# Patient Record
Sex: FEMALE | Race: BLACK OR AFRICAN AMERICAN | NOT HISPANIC OR LATINO | Employment: OTHER | URBAN - METROPOLITAN AREA
[De-identification: names, ages, dates, MRNs, and addresses within clinical notes are randomized per-mention and may not be internally consistent; named-entity substitution may affect disease eponyms.]

---

## 2023-01-01 ENCOUNTER — APPOINTMENT (OUTPATIENT)
Dept: DIALYSIS | Facility: HOSPITAL | Age: 53
DRG: 252 | End: 2023-01-01
Payer: MEDICARE

## 2023-01-01 ENCOUNTER — HOSPITAL ENCOUNTER (INPATIENT)
Dept: DATA CONVERSION | Facility: HOSPITAL | Age: 53
LOS: 16 days | Discharge: HOSPICE | DRG: 252 | End: 2023-10-08
Attending: EMERGENCY MEDICINE | Admitting: SURGERY
Payer: MEDICARE

## 2023-01-01 ENCOUNTER — HOSPITAL ENCOUNTER (OUTPATIENT)
Dept: DATA CONVERSION | Facility: HOSPITAL | Age: 53
End: 2023-08-24
Attending: INTERNAL MEDICINE
Payer: MEDICARE

## 2023-01-01 ENCOUNTER — APPOINTMENT (OUTPATIENT)
Dept: RADIOLOGY | Facility: HOSPITAL | Age: 53
DRG: 252 | End: 2023-01-01
Payer: MEDICARE

## 2023-01-01 VITALS
HEIGHT: 60 IN | OXYGEN SATURATION: 98 % | WEIGHT: 97 LBS | TEMPERATURE: 98.6 F | BODY MASS INDEX: 19.04 KG/M2 | SYSTOLIC BLOOD PRESSURE: 173 MMHG | HEART RATE: 98 BPM | RESPIRATION RATE: 17 BRPM | DIASTOLIC BLOOD PRESSURE: 70 MMHG

## 2023-01-01 DIAGNOSIS — N18.6 ESRD (END STAGE RENAL DISEASE) ON DIALYSIS (MULTI): Primary | ICD-10-CM

## 2023-01-01 DIAGNOSIS — T82.838A: ICD-10-CM

## 2023-01-01 DIAGNOSIS — Z99.2 ESRD (END STAGE RENAL DISEASE) ON DIALYSIS (MULTI): Primary | ICD-10-CM

## 2023-01-01 LAB
ABO GROUP (TYPE) IN BLOOD: NORMAL
ACANTHOCYTES BLD QL SMEAR: ABNORMAL
ACTIVATED PARTIAL THROMBOPLASTIN TIME IN PPP BY COAGULATION ASSAY: 33 SEC (ref 27–38)
ACTIVATED PARTIAL THROMBOPLASTIN TIME IN PPP BY COAGULATION ASSAY: 41 SEC (ref 27–38)
ACTIVATED PARTIAL THROMBOPLASTIN TIME IN PPP BY COAGULATION ASSAY: 55 SEC (ref 27–38)
AFB CULTURE: NORMAL
AFB STAIN: NORMAL
ALANINE AMINOTRANSFERASE (SGPT) (U/L) IN SER/PLAS: 10 U/L (ref 7–45)
ALANINE AMINOTRANSFERASE (SGPT) (U/L) IN SER/PLAS: 11 U/L (ref 7–45)
ALANINE AMINOTRANSFERASE (SGPT) (U/L) IN SER/PLAS: 4 U/L (ref 7–45)
ALANINE AMINOTRANSFERASE (SGPT) (U/L) IN SER/PLAS: 5 U/L (ref 7–45)
ALANINE AMINOTRANSFERASE (SGPT) (U/L) IN SER/PLAS: 6 U/L (ref 7–45)
ALANINE AMINOTRANSFERASE (SGPT) (U/L) IN SER/PLAS: 6 U/L (ref 7–45)
ALANINE AMINOTRANSFERASE (SGPT) (U/L) IN SER/PLAS: 7 U/L (ref 7–45)
ALANINE AMINOTRANSFERASE (SGPT) (U/L) IN SER/PLAS: 7 U/L (ref 7–45)
ALANINE AMINOTRANSFERASE (SGPT) (U/L) IN SER/PLAS: 8 U/L (ref 7–45)
ALANINE AMINOTRANSFERASE (SGPT) (U/L) IN SER/PLAS: NORMAL
ALANINE AMINOTRANSFERASE (SGPT) (U/L) IN SER/PLAS: NORMAL
ALBUMIN (G/DL) IN SER/PLAS: 2.5 G/DL (ref 3.4–5)
ALBUMIN (G/DL) IN SER/PLAS: 2.6 G/DL (ref 3.4–5)
ALBUMIN (G/DL) IN SER/PLAS: 2.7 G/DL (ref 3.4–5)
ALBUMIN (G/DL) IN SER/PLAS: 2.7 G/DL (ref 3.4–5)
ALBUMIN (G/DL) IN SER/PLAS: 2.8 G/DL (ref 3.4–5)
ALBUMIN (G/DL) IN SER/PLAS: 2.9 G/DL (ref 3.4–5)
ALBUMIN (G/DL) IN SER/PLAS: 3 G/DL (ref 3.4–5)
ALBUMIN (G/DL) IN SER/PLAS: 3.1 G/DL (ref 3.4–5)
ALBUMIN (G/DL) IN SER/PLAS: 3.1 G/DL (ref 3.4–5)
ALBUMIN (G/DL) IN SER/PLAS: 3.2 G/DL (ref 3.4–5)
ALBUMIN (G/DL) IN SER/PLAS: 4.2 G/DL (ref 3.4–5)
ALBUMIN (G/DL) IN SER/PLAS: 4.6 G/DL (ref 3.4–5)
ALBUMIN (G/DL) IN SER/PLAS: 4.7 G/DL (ref 3.4–5)
ALBUMIN (G/DL) IN SER/PLAS: 4.7 G/DL (ref 3.4–5)
ALBUMIN (G/DL) IN SER/PLAS: 4.8 G/DL (ref 3.4–5)
ALBUMIN (G/DL) IN SER/PLAS: 5.1 G/DL (ref 3.4–5)
ALBUMIN (G/DL) IN SER/PLAS: 5.1 G/DL (ref 3.4–5)
ALBUMIN (G/DL) IN SER/PLAS: 5.5 G/DL (ref 3.4–5)
ALBUMIN (G/DL) IN SER/PLAS: NORMAL
ALBUMIN (G/DL) IN SER/PLAS: NORMAL
ALBUMIN ELP: 4.9 G/DL (ref 3.4–5)
ALBUMIN SERPL BCP-MCNC: 2.7 G/DL (ref 3.4–5)
ALBUMIN SERPL BCP-MCNC: 2.8 G/DL (ref 3.4–5)
ALBUMIN SERPL BCP-MCNC: 2.9 G/DL (ref 3.4–5)
ALKALINE PHOSPHATASE (U/L) IN SER/PLAS: 101 U/L (ref 33–110)
ALKALINE PHOSPHATASE (U/L) IN SER/PLAS: 119 U/L (ref 33–110)
ALKALINE PHOSPHATASE (U/L) IN SER/PLAS: 123 U/L (ref 33–110)
ALKALINE PHOSPHATASE (U/L) IN SER/PLAS: 131 U/L (ref 33–110)
ALKALINE PHOSPHATASE (U/L) IN SER/PLAS: 137 U/L (ref 33–110)
ALKALINE PHOSPHATASE (U/L) IN SER/PLAS: 146 U/L (ref 33–110)
ALKALINE PHOSPHATASE (U/L) IN SER/PLAS: 150 U/L (ref 33–110)
ALKALINE PHOSPHATASE (U/L) IN SER/PLAS: 152 U/L (ref 33–110)
ALKALINE PHOSPHATASE (U/L) IN SER/PLAS: 157 U/L (ref 33–110)
ALKALINE PHOSPHATASE (U/L) IN SER/PLAS: NORMAL
ALKALINE PHOSPHATASE (U/L) IN SER/PLAS: NORMAL
ALP SERPL-CCNC: 105 U/L (ref 33–110)
ALPHA 1: 0.5 G/DL (ref 0.2–0.6)
ALPHA 2: 0.8 G/DL (ref 0.4–1.1)
ALT SERPL W P-5'-P-CCNC: 3 U/L (ref 7–45)
AMMONIA (UMOL/L) IN PLASMA: NORMAL
ANION GAP BLDV CALCULATED.4IONS-SCNC: 5 MMOL/L (ref 10–25)
ANION GAP IN SER/PLAS: 11 MMOL/L (ref 10–20)
ANION GAP IN SER/PLAS: 12 MMOL/L (ref 10–20)
ANION GAP IN SER/PLAS: 13 MMOL/L (ref 10–20)
ANION GAP IN SER/PLAS: 14 MMOL/L (ref 10–20)
ANION GAP IN SER/PLAS: 14 MMOL/L (ref 10–20)
ANION GAP IN SER/PLAS: 15 MMOL/L (ref 10–20)
ANION GAP IN SER/PLAS: 16 MMOL/L (ref 10–20)
ANION GAP IN SER/PLAS: 16 MMOL/L (ref 10–20)
ANION GAP IN SER/PLAS: 18 MMOL/L (ref 10–20)
ANION GAP IN SER/PLAS: 20 MMOL/L (ref 10–20)
ANION GAP IN SER/PLAS: 20 MMOL/L (ref 10–20)
ANION GAP IN SER/PLAS: 21 MMOL/L (ref 10–20)
ANION GAP IN SER/PLAS: 21 MMOL/L (ref 10–20)
ANION GAP IN SER/PLAS: 25 MMOL/L (ref 10–20)
ANION GAP IN SER/PLAS: 25 MMOL/L (ref 10–20)
ANION GAP IN SER/PLAS: 26 MMOL/L (ref 10–20)
ANION GAP IN SER/PLAS: 26 MMOL/L (ref 10–20)
ANION GAP IN SER/PLAS: 30 MMOL/L (ref 10–20)
ANION GAP IN SER/PLAS: NORMAL
ANION GAP IN SER/PLAS: NORMAL
ANION GAP SERPL CALC-SCNC: 13 MMOL/L (ref 10–20)
ANION GAP SERPL CALC-SCNC: 16 MMOL/L (ref 10–20)
ANION GAP SERPL CALC-SCNC: 18 MMOL/L (ref 10–20)
ANION GAP SERPL CALC-SCNC: 18 MMOL/L (ref 10–20)
ANION GAP SERPL CALC-SCNC: 26 MMOL/L (ref 10–20)
ANION GAP SERPL CALC-SCNC: 27 MMOL/L (ref 10–20)
ANTIBODY ELUTION: NEGATIVE
ANTIBODY SCREEN: NORMAL
APTT PPP: 30 SECONDS (ref 27–38)
APTT PPP: 30 SECONDS (ref 27–38)
APTT PPP: 32 SECONDS (ref 27–38)
ASPARTATE AMINOTRANSFERASE (SGOT) (U/L) IN SER/PLAS: 16 U/L (ref 9–39)
ASPARTATE AMINOTRANSFERASE (SGOT) (U/L) IN SER/PLAS: 16 U/L (ref 9–39)
ASPARTATE AMINOTRANSFERASE (SGOT) (U/L) IN SER/PLAS: 18 U/L (ref 9–39)
ASPARTATE AMINOTRANSFERASE (SGOT) (U/L) IN SER/PLAS: 22 U/L (ref 9–39)
ASPARTATE AMINOTRANSFERASE (SGOT) (U/L) IN SER/PLAS: 23 U/L (ref 9–39)
ASPARTATE AMINOTRANSFERASE (SGOT) (U/L) IN SER/PLAS: 32 U/L (ref 9–39)
ASPARTATE AMINOTRANSFERASE (SGOT) (U/L) IN SER/PLAS: 5 U/L (ref 9–39)
ASPARTATE AMINOTRANSFERASE (SGOT) (U/L) IN SER/PLAS: 6 U/L (ref 9–39)
ASPARTATE AMINOTRANSFERASE (SGOT) (U/L) IN SER/PLAS: 7 U/L (ref 9–39)
ASPARTATE AMINOTRANSFERASE (SGOT) (U/L) IN SER/PLAS: NORMAL
ASPARTATE AMINOTRANSFERASE (SGOT) (U/L) IN SER/PLAS: NORMAL
AST SERPL W P-5'-P-CCNC: 15 U/L (ref 9–39)
BACTERIA BLD CULT: NORMAL
BASE EXCESS BLDV CALC-SCNC: 6.5 MMOL/L (ref -2–3)
BASOPHILS # BLD AUTO: 0.13 X10*3/UL (ref 0–0.1)
BASOPHILS # BLD MANUAL: 0.4 X10*3/UL (ref 0–0.1)
BASOPHILS # BLD MANUAL: 0.71 X10*3/UL (ref 0–0.1)
BASOPHILS # BLD MANUAL: 1.34 X10*3/UL (ref 0–0.1)
BASOPHILS (10*3/UL) IN BLOOD BY AUTOMATED COUNT: 0.05 X10E9/L (ref 0–0.1)
BASOPHILS NFR BLD AUTO: 1 %
BASOPHILS NFR BLD MANUAL: 2.7 %
BASOPHILS NFR BLD MANUAL: 4.3 %
BASOPHILS NFR BLD MANUAL: 4.9 %
BASOPHILS/100 LEUKOCYTES IN BLOOD BY AUTOMATED COUNT: 0.3 % (ref 0–2)
BB ANTIBODY IDENTIFICATION: NORMAL
BETA: 0.8 G/DL (ref 0.5–1.2)
BILIRUB SERPL-MCNC: 1.1 MG/DL (ref 0–1.2)
BILIRUBIN TOTAL (MG/DL) IN SER/PLAS: 0.7 MG/DL (ref 0–1.2)
BILIRUBIN TOTAL (MG/DL) IN SER/PLAS: 0.7 MG/DL (ref 0–1.2)
BILIRUBIN TOTAL (MG/DL) IN SER/PLAS: 0.8 MG/DL (ref 0–1.2)
BILIRUBIN TOTAL (MG/DL) IN SER/PLAS: 1.4 MG/DL (ref 0–1.2)
BILIRUBIN TOTAL (MG/DL) IN SER/PLAS: 1.5 MG/DL (ref 0–1.2)
BILIRUBIN TOTAL (MG/DL) IN SER/PLAS: 2.1 MG/DL (ref 0–1.2)
BILIRUBIN TOTAL (MG/DL) IN SER/PLAS: NORMAL
BILIRUBIN TOTAL (MG/DL) IN SER/PLAS: NORMAL
BLOOD CULTURE: NORMAL
BLOOD EXPIRATION DATE: NORMAL
BLOOD EXPIRATION DATE: NORMAL
BODY TEMPERATURE: ABNORMAL
BUN SERPL-MCNC: 13 MG/DL (ref 6–23)
BUN SERPL-MCNC: 14 MG/DL (ref 6–23)
BUN SERPL-MCNC: 22 MG/DL (ref 6–23)
BUN SERPL-MCNC: 35 MG/DL (ref 6–23)
BUN SERPL-MCNC: 36 MG/DL (ref 6–23)
BUN SERPL-MCNC: 71 MG/DL (ref 6–23)
C REACTIVE PROTEIN (MG/L) IN SER/PLAS: 9.84 MG/DL
C. DIFFICILE TOXIN, PCR: NOT DETECTED
CA-I BLDV-SCNC: 1.17 MMOL/L (ref 1.1–1.33)
CALCIDIOL (25 OH VITAMIN D3) (NG/ML) IN SER/PLAS: 15 NG/ML
CALCIUM (MG/DL) IN SER/PLAS: 10.7 MG/DL (ref 8.6–10.3)
CALCIUM (MG/DL) IN SER/PLAS: 11 MG/DL (ref 8.6–10.3)
CALCIUM (MG/DL) IN SER/PLAS: 11.1 MG/DL (ref 8.6–10.3)
CALCIUM (MG/DL) IN SER/PLAS: 11.2 MG/DL (ref 8.6–10.3)
CALCIUM (MG/DL) IN SER/PLAS: 11.2 MG/DL (ref 8.6–10.3)
CALCIUM (MG/DL) IN SER/PLAS: 11.5 MG/DL (ref 8.6–10.3)
CALCIUM (MG/DL) IN SER/PLAS: 11.6 MG/DL (ref 8.6–10.3)
CALCIUM (MG/DL) IN SER/PLAS: 11.8 MG/DL (ref 8.6–10.3)
CALCIUM (MG/DL) IN SER/PLAS: 12.2 MG/DL (ref 8.6–10.3)
CALCIUM (MG/DL) IN SER/PLAS: 12.4 MG/DL (ref 8.6–10.3)
CALCIUM (MG/DL) IN SER/PLAS: 13.5 MG/DL (ref 8.6–10.3)
CALCIUM (MG/DL) IN SER/PLAS: 7.9 MG/DL (ref 8.6–10.6)
CALCIUM (MG/DL) IN SER/PLAS: 8.5 MG/DL (ref 8.6–10.6)
CALCIUM (MG/DL) IN SER/PLAS: 8.6 MG/DL (ref 8.6–10.6)
CALCIUM (MG/DL) IN SER/PLAS: 8.8 MG/DL (ref 8.6–10.6)
CALCIUM (MG/DL) IN SER/PLAS: 8.8 MG/DL (ref 8.6–10.6)
CALCIUM (MG/DL) IN SER/PLAS: 8.9 MG/DL (ref 8.6–10.6)
CALCIUM (MG/DL) IN SER/PLAS: 8.9 MG/DL (ref 8.6–10.6)
CALCIUM (MG/DL) IN SER/PLAS: 9 MG/DL (ref 8.6–10.6)
CALCIUM (MG/DL) IN SER/PLAS: 9.1 MG/DL (ref 8.6–10.3)
CALCIUM (MG/DL) IN SER/PLAS: 9.5 MG/DL (ref 8.6–10.6)
CALCIUM (MG/DL) IN SER/PLAS: 9.5 MG/DL (ref 8.6–10.6)
CALCIUM (MG/DL) IN SER/PLAS: 9.9 MG/DL (ref 8.6–10.3)
CALCIUM (MG/DL) IN SER/PLAS: NORMAL
CALCIUM (MG/DL) IN SER/PLAS: NORMAL
CALCIUM SERPL-MCNC: 10.2 MG/DL (ref 8.6–10.6)
CALCIUM SERPL-MCNC: 8.7 MG/DL (ref 8.6–10.6)
CALCIUM SERPL-MCNC: 8.9 MG/DL (ref 8.6–10.6)
CALCIUM SERPL-MCNC: 9.3 MG/DL (ref 8.6–10.6)
CALCIUM SERPL-MCNC: 9.6 MG/DL (ref 8.6–10.6)
CALCIUM SERPL-MCNC: 9.9 MG/DL (ref 8.6–10.6)
CARBON DIOXIDE, TOTAL (MMOL/L) IN SER/PLAS: 22 MMOL/L (ref 21–32)
CARBON DIOXIDE, TOTAL (MMOL/L) IN SER/PLAS: 23 MMOL/L (ref 21–32)
CARBON DIOXIDE, TOTAL (MMOL/L) IN SER/PLAS: 25 MMOL/L (ref 21–32)
CARBON DIOXIDE, TOTAL (MMOL/L) IN SER/PLAS: 26 MMOL/L (ref 21–32)
CARBON DIOXIDE, TOTAL (MMOL/L) IN SER/PLAS: 26 MMOL/L (ref 21–32)
CARBON DIOXIDE, TOTAL (MMOL/L) IN SER/PLAS: 27 MMOL/L (ref 21–32)
CARBON DIOXIDE, TOTAL (MMOL/L) IN SER/PLAS: 28 MMOL/L (ref 21–32)
CARBON DIOXIDE, TOTAL (MMOL/L) IN SER/PLAS: 29 MMOL/L (ref 21–32)
CARBON DIOXIDE, TOTAL (MMOL/L) IN SER/PLAS: 30 MMOL/L (ref 21–32)
CARBON DIOXIDE, TOTAL (MMOL/L) IN SER/PLAS: 31 MMOL/L (ref 21–32)
CARBON DIOXIDE, TOTAL (MMOL/L) IN SER/PLAS: 31 MMOL/L (ref 21–32)
CARBON DIOXIDE, TOTAL (MMOL/L) IN SER/PLAS: 32 MMOL/L (ref 21–32)
CARBON DIOXIDE, TOTAL (MMOL/L) IN SER/PLAS: 32 MMOL/L (ref 21–32)
CARBON DIOXIDE, TOTAL (MMOL/L) IN SER/PLAS: 34 MMOL/L (ref 21–32)
CARBON DIOXIDE, TOTAL (MMOL/L) IN SER/PLAS: 36 MMOL/L (ref 21–32)
CARBON DIOXIDE, TOTAL (MMOL/L) IN SER/PLAS: 37 MMOL/L (ref 21–32)
CARBON DIOXIDE, TOTAL (MMOL/L) IN SER/PLAS: NORMAL
CARBON DIOXIDE, TOTAL (MMOL/L) IN SER/PLAS: NORMAL
CASE #: NORMAL
CASE #: NORMAL
CHLORIDE (MMOL/L) IN SER/PLAS: 100 MMOL/L (ref 98–107)
CHLORIDE (MMOL/L) IN SER/PLAS: 100 MMOL/L (ref 98–107)
CHLORIDE (MMOL/L) IN SER/PLAS: 102 MMOL/L (ref 98–107)
CHLORIDE (MMOL/L) IN SER/PLAS: 103 MMOL/L (ref 98–107)
CHLORIDE (MMOL/L) IN SER/PLAS: 82 MMOL/L (ref 98–107)
CHLORIDE (MMOL/L) IN SER/PLAS: 84 MMOL/L (ref 98–107)
CHLORIDE (MMOL/L) IN SER/PLAS: 85 MMOL/L (ref 98–107)
CHLORIDE (MMOL/L) IN SER/PLAS: 88 MMOL/L (ref 98–107)
CHLORIDE (MMOL/L) IN SER/PLAS: 88 MMOL/L (ref 98–107)
CHLORIDE (MMOL/L) IN SER/PLAS: 89 MMOL/L (ref 98–107)
CHLORIDE (MMOL/L) IN SER/PLAS: 89 MMOL/L (ref 98–107)
CHLORIDE (MMOL/L) IN SER/PLAS: 90 MMOL/L (ref 98–107)
CHLORIDE (MMOL/L) IN SER/PLAS: 91 MMOL/L (ref 98–107)
CHLORIDE (MMOL/L) IN SER/PLAS: 91 MMOL/L (ref 98–107)
CHLORIDE (MMOL/L) IN SER/PLAS: 92 MMOL/L (ref 98–107)
CHLORIDE (MMOL/L) IN SER/PLAS: 92 MMOL/L (ref 98–107)
CHLORIDE (MMOL/L) IN SER/PLAS: 93 MMOL/L (ref 98–107)
CHLORIDE (MMOL/L) IN SER/PLAS: 94 MMOL/L (ref 98–107)
CHLORIDE (MMOL/L) IN SER/PLAS: 95 MMOL/L (ref 98–107)
CHLORIDE (MMOL/L) IN SER/PLAS: 97 MMOL/L (ref 98–107)
CHLORIDE (MMOL/L) IN SER/PLAS: 97 MMOL/L (ref 98–107)
CHLORIDE (MMOL/L) IN SER/PLAS: 98 MMOL/L (ref 98–107)
CHLORIDE (MMOL/L) IN SER/PLAS: NORMAL
CHLORIDE (MMOL/L) IN SER/PLAS: NORMAL
CHLORIDE BLDV-SCNC: 104 MMOL/L (ref 98–107)
CHLORIDE SERPL-SCNC: 100 MMOL/L (ref 98–107)
CHLORIDE SERPL-SCNC: 101 MMOL/L (ref 98–107)
CHLORIDE SERPL-SCNC: 102 MMOL/L (ref 98–107)
CHLORIDE SERPL-SCNC: 103 MMOL/L (ref 98–107)
CHLORIDE SERPL-SCNC: 104 MMOL/L (ref 98–107)
CHLORIDE SERPL-SCNC: 105 MMOL/L (ref 98–107)
CO2 SERPL-SCNC: 21 MMOL/L (ref 21–32)
CO2 SERPL-SCNC: 22 MMOL/L (ref 21–32)
CO2 SERPL-SCNC: 26 MMOL/L (ref 21–32)
CO2 SERPL-SCNC: 27 MMOL/L (ref 21–32)
CO2 SERPL-SCNC: 28 MMOL/L (ref 21–32)
CO2 SERPL-SCNC: 32 MMOL/L (ref 21–32)
COBALAMIN (VITAMIN B12) (PG/ML) IN SER/PLAS: 1631 PG/ML (ref 211–911)
CORTISOL (UG/DL) IN SERUM - AM: 15.1 UG/DL (ref 5–20)
CREAT SERPL-MCNC: 1.48 MG/DL (ref 0.5–1.05)
CREAT SERPL-MCNC: 1.48 MG/DL (ref 0.5–1.05)
CREAT SERPL-MCNC: 1.85 MG/DL (ref 0.5–1.05)
CREAT SERPL-MCNC: 2.74 MG/DL (ref 0.5–1.05)
CREAT SERPL-MCNC: 2.78 MG/DL (ref 0.5–1.05)
CREAT SERPL-MCNC: 3.81 MG/DL (ref 0.5–1.05)
CREATININE (MG/DL) IN SER/PLAS: 1.27 MG/DL (ref 0.5–1.05)
CREATININE (MG/DL) IN SER/PLAS: 1.47 MG/DL (ref 0.5–1.05)
CREATININE (MG/DL) IN SER/PLAS: 1.73 MG/DL (ref 0.5–1.05)
CREATININE (MG/DL) IN SER/PLAS: 1.94 MG/DL (ref 0.5–1.05)
CREATININE (MG/DL) IN SER/PLAS: 1.99 MG/DL (ref 0.5–1.05)
CREATININE (MG/DL) IN SER/PLAS: 2.51 MG/DL (ref 0.5–1.05)
CREATININE (MG/DL) IN SER/PLAS: 2.67 MG/DL (ref 0.5–1.05)
CREATININE (MG/DL) IN SER/PLAS: 3.57 MG/DL (ref 0.5–1.05)
CREATININE (MG/DL) IN SER/PLAS: 3.57 MG/DL (ref 0.5–1.05)
CREATININE (MG/DL) IN SER/PLAS: 3.63 MG/DL (ref 0.5–1.05)
CREATININE (MG/DL) IN SER/PLAS: 3.65 MG/DL (ref 0.5–1.05)
CREATININE (MG/DL) IN SER/PLAS: 3.72 MG/DL (ref 0.5–1.05)
CREATININE (MG/DL) IN SER/PLAS: 4.57 MG/DL (ref 0.5–1.05)
CREATININE (MG/DL) IN SER/PLAS: 4.95 MG/DL (ref 0.5–1.05)
CREATININE (MG/DL) IN SER/PLAS: 4.97 MG/DL (ref 0.5–1.05)
CREATININE (MG/DL) IN SER/PLAS: 5.01 MG/DL (ref 0.5–1.05)
CREATININE (MG/DL) IN SER/PLAS: 5.22 MG/DL (ref 0.5–1.05)
CREATININE (MG/DL) IN SER/PLAS: 5.9 MG/DL (ref 0.5–1.05)
CREATININE (MG/DL) IN SER/PLAS: 6.44 MG/DL (ref 0.5–1.05)
CREATININE (MG/DL) IN SER/PLAS: 6.9 MG/DL (ref 0.5–1.05)
CREATININE (MG/DL) IN SER/PLAS: 7 MG/DL (ref 0.5–1.05)
CREATININE (MG/DL) IN SER/PLAS: 7.6 MG/DL (ref 0.5–1.05)
CREATININE (MG/DL) IN SER/PLAS: 8.23 MG/DL (ref 0.5–1.05)
CREATININE (MG/DL) IN SER/PLAS: 8.36 MG/DL (ref 0.5–1.05)
CREATININE (MG/DL) IN SER/PLAS: 8.53 MG/DL (ref 0.5–1.05)
CREATININE (MG/DL) IN SER/PLAS: NORMAL
CREATININE (MG/DL) IN SER/PLAS: NORMAL
D DIMER PPP FEU-MCNC: 4882 NG/ML FEU
DAT-COMPLEMENT: NORMAL
DAT-COMPLEMENT: NORMAL
DAT-IGG: NORMAL
DAT-IGG: NORMAL
DAT-POLYSPECIFIC: NORMAL
DAT-POLYSPECIFIC: NORMAL
DISPENSE STATUS: NORMAL
DISPENSE STATUS: NORMAL
EOSINOPHIL # BLD AUTO: 1.08 X10*3/UL (ref 0–0.7)
EOSINOPHIL # BLD MANUAL: 1.31 X10*3/UL (ref 0–0.7)
EOSINOPHIL # BLD MANUAL: 2.32 X10*3/UL (ref 0–0.7)
EOSINOPHIL # BLD MANUAL: 2.67 X10*3/UL (ref 0–0.7)
EOSINOPHIL NFR BLD AUTO: 8.6 %
EOSINOPHIL NFR BLD MANUAL: 16.1 %
EOSINOPHIL NFR BLD MANUAL: 8.6 %
EOSINOPHIL NFR BLD MANUAL: 8.9 %
EOSINOPHILS (10*3/UL) IN BLOOD BY AUTOMATED COUNT: 0.81 X10E9/L (ref 0–0.7)
EOSINOPHILS/100 LEUKOCYTES IN BLOOD BY AUTOMATED COUNT: 5.3 % (ref 0–6)
ERYTHROCYTE DISTRIBUTION WIDTH (RATIO) BY AUTOMATED COUNT: 15.4 % (ref 11.5–14.5)
ERYTHROCYTE DISTRIBUTION WIDTH (RATIO) BY AUTOMATED COUNT: 15.9 % (ref 11.5–14.5)
ERYTHROCYTE DISTRIBUTION WIDTH (RATIO) BY AUTOMATED COUNT: 16.2 % (ref 11.5–14.5)
ERYTHROCYTE DISTRIBUTION WIDTH (RATIO) BY AUTOMATED COUNT: 16.3 % (ref 11.5–14.5)
ERYTHROCYTE DISTRIBUTION WIDTH (RATIO) BY AUTOMATED COUNT: 16.3 % (ref 11.5–14.5)
ERYTHROCYTE DISTRIBUTION WIDTH (RATIO) BY AUTOMATED COUNT: 16.4 % (ref 11.5–14.5)
ERYTHROCYTE DISTRIBUTION WIDTH (RATIO) BY AUTOMATED COUNT: 16.9 % (ref 11.5–14.5)
ERYTHROCYTE DISTRIBUTION WIDTH (RATIO) BY AUTOMATED COUNT: 16.9 % (ref 11.5–14.5)
ERYTHROCYTE DISTRIBUTION WIDTH (RATIO) BY AUTOMATED COUNT: 17.2 % (ref 11.5–14.5)
ERYTHROCYTE DISTRIBUTION WIDTH (RATIO) BY AUTOMATED COUNT: 17.2 % (ref 11.5–14.5)
ERYTHROCYTE DISTRIBUTION WIDTH (RATIO) BY AUTOMATED COUNT: 17.6 % (ref 11.5–14.5)
ERYTHROCYTE DISTRIBUTION WIDTH (RATIO) BY AUTOMATED COUNT: 17.8 % (ref 11.5–14.5)
ERYTHROCYTE DISTRIBUTION WIDTH (RATIO) BY AUTOMATED COUNT: 18 % (ref 11.5–14.5)
ERYTHROCYTE DISTRIBUTION WIDTH (RATIO) BY AUTOMATED COUNT: 18.1 % (ref 11.5–14.5)
ERYTHROCYTE DISTRIBUTION WIDTH (RATIO) BY AUTOMATED COUNT: 18.1 % (ref 11.5–14.5)
ERYTHROCYTE DISTRIBUTION WIDTH (RATIO) BY AUTOMATED COUNT: 18.2 % (ref 11.5–14.5)
ERYTHROCYTE DISTRIBUTION WIDTH (RATIO) BY AUTOMATED COUNT: 18.4 % (ref 11.5–14.5)
ERYTHROCYTE DISTRIBUTION WIDTH (RATIO) BY AUTOMATED COUNT: 18.6 % (ref 11.5–14.5)
ERYTHROCYTE DISTRIBUTION WIDTH (RATIO) BY AUTOMATED COUNT: 18.7 % (ref 11.5–14.5)
ERYTHROCYTE DISTRIBUTION WIDTH (RATIO) BY AUTOMATED COUNT: 19.9 % (ref 11.5–14.5)
ERYTHROCYTE DISTRIBUTION WIDTH (RATIO) BY AUTOMATED COUNT: 19.9 % (ref 11.5–14.5)
ERYTHROCYTE DISTRIBUTION WIDTH (RATIO) BY AUTOMATED COUNT: NORMAL
ERYTHROCYTE DISTRIBUTION WIDTH (RATIO) BY AUTOMATED COUNT: NORMAL
ERYTHROCYTE MEAN CORPUSCULAR HEMOGLOBIN CONCENTRATION (G/DL) BY AUTOMATED: 29 G/DL (ref 32–36)
ERYTHROCYTE MEAN CORPUSCULAR HEMOGLOBIN CONCENTRATION (G/DL) BY AUTOMATED: 29.2 G/DL (ref 32–36)
ERYTHROCYTE MEAN CORPUSCULAR HEMOGLOBIN CONCENTRATION (G/DL) BY AUTOMATED: 29.3 G/DL (ref 32–36)
ERYTHROCYTE MEAN CORPUSCULAR HEMOGLOBIN CONCENTRATION (G/DL) BY AUTOMATED: 29.6 G/DL (ref 32–36)
ERYTHROCYTE MEAN CORPUSCULAR HEMOGLOBIN CONCENTRATION (G/DL) BY AUTOMATED: 29.7 G/DL (ref 32–36)
ERYTHROCYTE MEAN CORPUSCULAR HEMOGLOBIN CONCENTRATION (G/DL) BY AUTOMATED: 29.7 G/DL (ref 32–36)
ERYTHROCYTE MEAN CORPUSCULAR HEMOGLOBIN CONCENTRATION (G/DL) BY AUTOMATED: 29.9 G/DL (ref 32–36)
ERYTHROCYTE MEAN CORPUSCULAR HEMOGLOBIN CONCENTRATION (G/DL) BY AUTOMATED: 30.2 G/DL (ref 32–36)
ERYTHROCYTE MEAN CORPUSCULAR HEMOGLOBIN CONCENTRATION (G/DL) BY AUTOMATED: 30.9 G/DL (ref 32–36)
ERYTHROCYTE MEAN CORPUSCULAR HEMOGLOBIN CONCENTRATION (G/DL) BY AUTOMATED: 31.1 G/DL (ref 32–36)
ERYTHROCYTE MEAN CORPUSCULAR HEMOGLOBIN CONCENTRATION (G/DL) BY AUTOMATED: 31.2 G/DL (ref 32–36)
ERYTHROCYTE MEAN CORPUSCULAR HEMOGLOBIN CONCENTRATION (G/DL) BY AUTOMATED: 31.7 G/DL (ref 32–36)
ERYTHROCYTE MEAN CORPUSCULAR HEMOGLOBIN CONCENTRATION (G/DL) BY AUTOMATED: 32.2 G/DL (ref 32–36)
ERYTHROCYTE MEAN CORPUSCULAR HEMOGLOBIN CONCENTRATION (G/DL) BY AUTOMATED: 32.2 G/DL (ref 32–36)
ERYTHROCYTE MEAN CORPUSCULAR HEMOGLOBIN CONCENTRATION (G/DL) BY AUTOMATED: 32.9 G/DL (ref 32–36)
ERYTHROCYTE MEAN CORPUSCULAR HEMOGLOBIN CONCENTRATION (G/DL) BY AUTOMATED: 33.1 G/DL (ref 32–36)
ERYTHROCYTE MEAN CORPUSCULAR HEMOGLOBIN CONCENTRATION (G/DL) BY AUTOMATED: 34.2 G/DL (ref 32–36)
ERYTHROCYTE MEAN CORPUSCULAR HEMOGLOBIN CONCENTRATION (G/DL) BY AUTOMATED: 34.2 G/DL (ref 32–36)
ERYTHROCYTE MEAN CORPUSCULAR HEMOGLOBIN CONCENTRATION (G/DL) BY AUTOMATED: 34.8 G/DL (ref 32–36)
ERYTHROCYTE MEAN CORPUSCULAR HEMOGLOBIN CONCENTRATION (G/DL) BY AUTOMATED: 35 G/DL (ref 32–36)
ERYTHROCYTE MEAN CORPUSCULAR HEMOGLOBIN CONCENTRATION (G/DL) BY AUTOMATED: 35 G/DL (ref 32–36)
ERYTHROCYTE MEAN CORPUSCULAR HEMOGLOBIN CONCENTRATION (G/DL) BY AUTOMATED: NORMAL
ERYTHROCYTE MEAN CORPUSCULAR HEMOGLOBIN CONCENTRATION (G/DL) BY AUTOMATED: NORMAL
ERYTHROCYTE MEAN CORPUSCULAR VOLUME (FL) BY AUTOMATED COUNT: 100 FL (ref 80–100)
ERYTHROCYTE MEAN CORPUSCULAR VOLUME (FL) BY AUTOMATED COUNT: 84 FL (ref 80–100)
ERYTHROCYTE MEAN CORPUSCULAR VOLUME (FL) BY AUTOMATED COUNT: 84 FL (ref 80–100)
ERYTHROCYTE MEAN CORPUSCULAR VOLUME (FL) BY AUTOMATED COUNT: 87 FL (ref 80–100)
ERYTHROCYTE MEAN CORPUSCULAR VOLUME (FL) BY AUTOMATED COUNT: 88 FL (ref 80–100)
ERYTHROCYTE MEAN CORPUSCULAR VOLUME (FL) BY AUTOMATED COUNT: 88 FL (ref 80–100)
ERYTHROCYTE MEAN CORPUSCULAR VOLUME (FL) BY AUTOMATED COUNT: 91 FL (ref 80–100)
ERYTHROCYTE MEAN CORPUSCULAR VOLUME (FL) BY AUTOMATED COUNT: 92 FL (ref 80–100)
ERYTHROCYTE MEAN CORPUSCULAR VOLUME (FL) BY AUTOMATED COUNT: 93 FL (ref 80–100)
ERYTHROCYTE MEAN CORPUSCULAR VOLUME (FL) BY AUTOMATED COUNT: 94 FL (ref 80–100)
ERYTHROCYTE MEAN CORPUSCULAR VOLUME (FL) BY AUTOMATED COUNT: 95 FL (ref 80–100)
ERYTHROCYTE MEAN CORPUSCULAR VOLUME (FL) BY AUTOMATED COUNT: 95 FL (ref 80–100)
ERYTHROCYTE MEAN CORPUSCULAR VOLUME (FL) BY AUTOMATED COUNT: 96 FL (ref 80–100)
ERYTHROCYTE MEAN CORPUSCULAR VOLUME (FL) BY AUTOMATED COUNT: 97 FL (ref 80–100)
ERYTHROCYTE MEAN CORPUSCULAR VOLUME (FL) BY AUTOMATED COUNT: 97 FL (ref 80–100)
ERYTHROCYTE MEAN CORPUSCULAR VOLUME (FL) BY AUTOMATED COUNT: 98 FL (ref 80–100)
ERYTHROCYTE MEAN CORPUSCULAR VOLUME (FL) BY AUTOMATED COUNT: 98 FL (ref 80–100)
ERYTHROCYTE MEAN CORPUSCULAR VOLUME (FL) BY AUTOMATED COUNT: NORMAL
ERYTHROCYTE MEAN CORPUSCULAR VOLUME (FL) BY AUTOMATED COUNT: NORMAL
ERYTHROCYTE [DISTWIDTH] IN BLOOD BY AUTOMATED COUNT: 16 % (ref 11.5–14.5)
ERYTHROCYTE [DISTWIDTH] IN BLOOD BY AUTOMATED COUNT: 16.2 % (ref 11.5–14.5)
ERYTHROCYTE [DISTWIDTH] IN BLOOD BY AUTOMATED COUNT: 17 % (ref 11.5–14.5)
ERYTHROCYTE [DISTWIDTH] IN BLOOD BY AUTOMATED COUNT: 17.1 % (ref 11.5–14.5)
ERYTHROCYTE [DISTWIDTH] IN BLOOD BY AUTOMATED COUNT: 17.2 % (ref 11.5–14.5)
ERYTHROCYTE [DISTWIDTH] IN BLOOD BY AUTOMATED COUNT: 18.5 % (ref 11.5–14.5)
ERYTHROCYTE [DISTWIDTH] IN BLOOD BY AUTOMATED COUNT: 21.3 % (ref 11.5–14.5)
ERYTHROCYTE [DISTWIDTH] IN BLOOD BY AUTOMATED COUNT: 30.8 % (ref 11.5–14.5)
ERYTHROCYTES (10*6/UL) IN BLOOD BY AUTOMATED COUNT: 1.85 X10E12/L (ref 4–5.2)
ERYTHROCYTES (10*6/UL) IN BLOOD BY AUTOMATED COUNT: 1.87 X10E12/L (ref 4–5.2)
ERYTHROCYTES (10*6/UL) IN BLOOD BY AUTOMATED COUNT: 1.89 X10E12/L (ref 4–5.2)
ERYTHROCYTES (10*6/UL) IN BLOOD BY AUTOMATED COUNT: 2.13 X10E12/L (ref 4–5.2)
ERYTHROCYTES (10*6/UL) IN BLOOD BY AUTOMATED COUNT: 2.18 X10E12/L (ref 4–5.2)
ERYTHROCYTES (10*6/UL) IN BLOOD BY AUTOMATED COUNT: 2.33 X10E12/L (ref 4–5.2)
ERYTHROCYTES (10*6/UL) IN BLOOD BY AUTOMATED COUNT: 2.61 X10E12/L (ref 4–5.2)
ERYTHROCYTES (10*6/UL) IN BLOOD BY AUTOMATED COUNT: 2.64 X10E12/L (ref 4–5.2)
ERYTHROCYTES (10*6/UL) IN BLOOD BY AUTOMATED COUNT: 2.66 X10E12/L (ref 4–5.2)
ERYTHROCYTES (10*6/UL) IN BLOOD BY AUTOMATED COUNT: 2.73 X10E12/L (ref 4–5.2)
ERYTHROCYTES (10*6/UL) IN BLOOD BY AUTOMATED COUNT: 2.74 X10E12/L (ref 4–5.2)
ERYTHROCYTES (10*6/UL) IN BLOOD BY AUTOMATED COUNT: 2.82 X10E12/L (ref 4–5.2)
ERYTHROCYTES (10*6/UL) IN BLOOD BY AUTOMATED COUNT: 2.9 X10E12/L (ref 4–5.2)
ERYTHROCYTES (10*6/UL) IN BLOOD BY AUTOMATED COUNT: 3.09 X10E12/L (ref 4–5.2)
ERYTHROCYTES (10*6/UL) IN BLOOD BY AUTOMATED COUNT: 3.29 X10E12/L (ref 4–5.2)
ERYTHROCYTES (10*6/UL) IN BLOOD BY AUTOMATED COUNT: 3.3 X10E12/L (ref 4–5.2)
ERYTHROCYTES (10*6/UL) IN BLOOD BY AUTOMATED COUNT: 3.91 X10E12/L (ref 4–5.2)
ERYTHROCYTES (10*6/UL) IN BLOOD BY AUTOMATED COUNT: 4.18 X10E12/L (ref 4–5.2)
ERYTHROCYTES (10*6/UL) IN BLOOD BY AUTOMATED COUNT: 4.23 X10E12/L (ref 4–5.2)
ERYTHROCYTES (10*6/UL) IN BLOOD BY AUTOMATED COUNT: 4.64 X10E12/L (ref 4–5.2)
ERYTHROCYTES (10*6/UL) IN BLOOD BY AUTOMATED COUNT: 4.78 X10E12/L (ref 4–5.2)
ERYTHROCYTES (10*6/UL) IN BLOOD BY AUTOMATED COUNT: NORMAL
ERYTHROCYTES (10*6/UL) IN BLOOD BY AUTOMATED COUNT: NORMAL
FERRITIN (UG/LL) IN SER/PLAS: 1437 UG/L (ref 8–150)
FERRITIN SERPL-MCNC: 3700 NG/ML (ref 8–150)
FIBRINOGEN PPP-MCNC: 224 MG/DL (ref 200–400)
FOLATE (NG/ML) IN SER/PLAS: 11 NG/ML
GAMMA GLOBULIN: 2.6 G/DL (ref 0.5–1.4)
GFR FEMALE: 10 ML/MIN/1.73M2
GFR FEMALE: 11 ML/MIN/1.73M2
GFR FEMALE: 14 ML/MIN/1.73M2
GFR FEMALE: 15 ML/MIN/1.73M2
GFR FEMALE: 15 ML/MIN/1.73M2
GFR FEMALE: 21 ML/MIN/1.73M2
GFR FEMALE: 22 ML/MIN/1.73M2
GFR FEMALE: 29 ML/MIN/1.73M2
GFR FEMALE: 30 ML/MIN/1.73M2
GFR FEMALE: 35 ML/MIN/1.73M2
GFR FEMALE: 42 ML/MIN/1.73M2
GFR FEMALE: 5 ML/MIN/1.73M2
GFR FEMALE: 50 ML/MIN/1.73M2
GFR FEMALE: 6 ML/MIN/1.73M2
GFR FEMALE: 6 ML/MIN/1.73M2
GFR FEMALE: 7 ML/MIN/1.73M2
GFR FEMALE: 7 ML/MIN/1.73M2
GFR FEMALE: 8 ML/MIN/1.73M2
GFR FEMALE: 9 ML/MIN/1.73M2
GFR FEMALE: NORMAL
GFR FEMALE: NORMAL
GFR MALE: NORMAL
GFR MALE: NORMAL
GFR SERPL CREATININE-BSD FRML MDRD: 13 ML/MIN/1.73M*2
GFR SERPL CREATININE-BSD FRML MDRD: 20 ML/MIN/1.73M*2
GFR SERPL CREATININE-BSD FRML MDRD: 20 ML/MIN/1.73M*2
GFR SERPL CREATININE-BSD FRML MDRD: 32 ML/MIN/1.73M*2
GFR SERPL CREATININE-BSD FRML MDRD: 42 ML/MIN/1.73M*2
GFR SERPL CREATININE-BSD FRML MDRD: 42 ML/MIN/1.73M*2
GLUCOSE (MG/DL) IN SER/PLAS: 109 MG/DL (ref 74–99)
GLUCOSE (MG/DL) IN SER/PLAS: 111 MG/DL (ref 74–99)
GLUCOSE (MG/DL) IN SER/PLAS: 120 MG/DL (ref 74–99)
GLUCOSE (MG/DL) IN SER/PLAS: 121 MG/DL (ref 74–99)
GLUCOSE (MG/DL) IN SER/PLAS: 252 MG/DL (ref 74–99)
GLUCOSE (MG/DL) IN SER/PLAS: 37 MG/DL (ref 74–99)
GLUCOSE (MG/DL) IN SER/PLAS: 46 MG/DL (ref 74–99)
GLUCOSE (MG/DL) IN SER/PLAS: 66 MG/DL (ref 74–99)
GLUCOSE (MG/DL) IN SER/PLAS: 71 MG/DL (ref 74–99)
GLUCOSE (MG/DL) IN SER/PLAS: 71 MG/DL (ref 74–99)
GLUCOSE (MG/DL) IN SER/PLAS: 72 MG/DL (ref 74–99)
GLUCOSE (MG/DL) IN SER/PLAS: 74 MG/DL (ref 74–99)
GLUCOSE (MG/DL) IN SER/PLAS: 74 MG/DL (ref 74–99)
GLUCOSE (MG/DL) IN SER/PLAS: 76 MG/DL (ref 74–99)
GLUCOSE (MG/DL) IN SER/PLAS: 78 MG/DL (ref 74–99)
GLUCOSE (MG/DL) IN SER/PLAS: 78 MG/DL (ref 74–99)
GLUCOSE (MG/DL) IN SER/PLAS: 80 MG/DL (ref 74–99)
GLUCOSE (MG/DL) IN SER/PLAS: 82 MG/DL (ref 74–99)
GLUCOSE (MG/DL) IN SER/PLAS: 82 MG/DL (ref 74–99)
GLUCOSE (MG/DL) IN SER/PLAS: 83 MG/DL (ref 74–99)
GLUCOSE (MG/DL) IN SER/PLAS: 90 MG/DL (ref 74–99)
GLUCOSE (MG/DL) IN SER/PLAS: 91 MG/DL (ref 74–99)
GLUCOSE (MG/DL) IN SER/PLAS: 91 MG/DL (ref 74–99)
GLUCOSE (MG/DL) IN SER/PLAS: 93 MG/DL (ref 74–99)
GLUCOSE (MG/DL) IN SER/PLAS: 95 MG/DL (ref 74–99)
GLUCOSE (MG/DL) IN SER/PLAS: NORMAL
GLUCOSE (MG/DL) IN SER/PLAS: NORMAL
GLUCOSE BLD MANUAL STRIP-MCNC: 100 MG/DL (ref 74–99)
GLUCOSE BLD MANUAL STRIP-MCNC: 101 MG/DL (ref 74–99)
GLUCOSE BLD MANUAL STRIP-MCNC: 102 MG/DL (ref 74–99)
GLUCOSE BLD MANUAL STRIP-MCNC: 103 MG/DL (ref 74–99)
GLUCOSE BLD MANUAL STRIP-MCNC: 104 MG/DL (ref 74–99)
GLUCOSE BLD MANUAL STRIP-MCNC: 104 MG/DL (ref 74–99)
GLUCOSE BLD MANUAL STRIP-MCNC: 105 MG/DL (ref 74–99)
GLUCOSE BLD MANUAL STRIP-MCNC: 108 MG/DL (ref 74–99)
GLUCOSE BLD MANUAL STRIP-MCNC: 108 MG/DL (ref 74–99)
GLUCOSE BLD MANUAL STRIP-MCNC: 109 MG/DL (ref 74–99)
GLUCOSE BLD MANUAL STRIP-MCNC: 115 MG/DL (ref 74–99)
GLUCOSE BLD MANUAL STRIP-MCNC: 118 MG/DL (ref 74–99)
GLUCOSE BLD MANUAL STRIP-MCNC: 120 MG/DL (ref 74–99)
GLUCOSE BLD MANUAL STRIP-MCNC: 121 MG/DL (ref 74–99)
GLUCOSE BLD MANUAL STRIP-MCNC: 122 MG/DL (ref 74–99)
GLUCOSE BLD MANUAL STRIP-MCNC: 125 MG/DL (ref 74–99)
GLUCOSE BLD MANUAL STRIP-MCNC: 136 MG/DL (ref 74–99)
GLUCOSE BLD MANUAL STRIP-MCNC: 138 MG/DL (ref 74–99)
GLUCOSE BLD MANUAL STRIP-MCNC: 151 MG/DL (ref 74–99)
GLUCOSE BLD MANUAL STRIP-MCNC: 56 MG/DL (ref 74–99)
GLUCOSE BLD MANUAL STRIP-MCNC: 62 MG/DL (ref 74–99)
GLUCOSE BLD MANUAL STRIP-MCNC: 65 MG/DL (ref 74–99)
GLUCOSE BLD MANUAL STRIP-MCNC: 68 MG/DL (ref 74–99)
GLUCOSE BLD MANUAL STRIP-MCNC: 72 MG/DL (ref 74–99)
GLUCOSE BLD MANUAL STRIP-MCNC: 73 MG/DL (ref 74–99)
GLUCOSE BLD MANUAL STRIP-MCNC: 76 MG/DL (ref 74–99)
GLUCOSE BLD MANUAL STRIP-MCNC: 76 MG/DL (ref 74–99)
GLUCOSE BLD MANUAL STRIP-MCNC: 77 MG/DL (ref 74–99)
GLUCOSE BLD MANUAL STRIP-MCNC: 78 MG/DL (ref 74–99)
GLUCOSE BLD MANUAL STRIP-MCNC: 78 MG/DL (ref 74–99)
GLUCOSE BLD MANUAL STRIP-MCNC: 81 MG/DL (ref 74–99)
GLUCOSE BLD MANUAL STRIP-MCNC: 82 MG/DL (ref 74–99)
GLUCOSE BLD MANUAL STRIP-MCNC: 83 MG/DL (ref 74–99)
GLUCOSE BLD MANUAL STRIP-MCNC: 86 MG/DL (ref 74–99)
GLUCOSE BLD MANUAL STRIP-MCNC: 88 MG/DL (ref 74–99)
GLUCOSE BLD MANUAL STRIP-MCNC: 91 MG/DL (ref 74–99)
GLUCOSE BLD MANUAL STRIP-MCNC: 95 MG/DL (ref 74–99)
GLUCOSE BLD MANUAL STRIP-MCNC: 95 MG/DL (ref 74–99)
GLUCOSE BLDV-MCNC: 68 MG/DL (ref 74–99)
GLUCOSE SERPL-MCNC: 103 MG/DL (ref 74–99)
GLUCOSE SERPL-MCNC: 51 MG/DL (ref 74–99)
GLUCOSE SERPL-MCNC: 59 MG/DL (ref 74–99)
GLUCOSE SERPL-MCNC: 62 MG/DL (ref 74–99)
GLUCOSE SERPL-MCNC: 91 MG/DL (ref 74–99)
GLUCOSE SERPL-MCNC: 96 MG/DL (ref 74–99)
GRAM STAIN: NORMAL
HAPTOGLOB SERPL NEPH-MCNC: <30 MG/DL (ref 30–200)
HAPTOGLOB SERPL NEPH-MCNC: <30 MG/DL (ref 30–200)
HBV CORE AB SER QL: NONREACTIVE
HBV SURFACE AB SER-ACNC: >1000 MIU/ML
HBV SURFACE AG SERPL QL IA: NONREACTIVE
HCO3 BLDV-SCNC: 32.1 MMOL/L (ref 22–26)
HCT VFR BLD AUTO: 13.9 % (ref 36–46)
HCT VFR BLD AUTO: 16.2 % (ref 36–46)
HCT VFR BLD AUTO: 16.3 % (ref 36–46)
HCT VFR BLD AUTO: 17.1 % (ref 36–46)
HCT VFR BLD AUTO: 17.3 % (ref 36–46)
HCT VFR BLD AUTO: 17.9 % (ref 36–46)
HCT VFR BLD AUTO: 19.2 % (ref 36–46)
HCT VFR BLD AUTO: 19.2 % (ref 36–46)
HCT VFR BLD EST: 19 % (ref 36–46)
HCV PCR QUANT: NOT DETECTED IU/ML
HCV RNA, PCR LOG: NORMAL LOG10 IU/ML
HEMATOCRIT (%) IN BLOOD BY AUTOMATED COUNT: 15.5 % (ref 36–46)
HEMATOCRIT (%) IN BLOOD BY AUTOMATED COUNT: 17.1 % (ref 36–46)
HEMATOCRIT (%) IN BLOOD BY AUTOMATED COUNT: 17.3 % (ref 36–46)
HEMATOCRIT (%) IN BLOOD BY AUTOMATED COUNT: 19 % (ref 36–46)
HEMATOCRIT (%) IN BLOOD BY AUTOMATED COUNT: 20.6 % (ref 36–46)
HEMATOCRIT (%) IN BLOOD BY AUTOMATED COUNT: 21.2 % (ref 36–46)
HEMATOCRIT (%) IN BLOOD BY AUTOMATED COUNT: 24 % (ref 36–46)
HEMATOCRIT (%) IN BLOOD BY AUTOMATED COUNT: 24.9 % (ref 36–46)
HEMATOCRIT (%) IN BLOOD BY AUTOMATED COUNT: 24.9 % (ref 36–46)
HEMATOCRIT (%) IN BLOOD BY AUTOMATED COUNT: 26 % (ref 36–46)
HEMATOCRIT (%) IN BLOOD BY AUTOMATED COUNT: 26.4 % (ref 36–46)
HEMATOCRIT (%) IN BLOOD BY AUTOMATED COUNT: 26.4 % (ref 36–46)
HEMATOCRIT (%) IN BLOOD BY AUTOMATED COUNT: 26.7 % (ref 36–46)
HEMATOCRIT (%) IN BLOOD BY AUTOMATED COUNT: 27.6 % (ref 36–46)
HEMATOCRIT (%) IN BLOOD BY AUTOMATED COUNT: 29 % (ref 36–46)
HEMATOCRIT (%) IN BLOOD BY AUTOMATED COUNT: 32.4 % (ref 36–46)
HEMATOCRIT (%) IN BLOOD BY AUTOMATED COUNT: 37.1 % (ref 36–46)
HEMATOCRIT (%) IN BLOOD BY AUTOMATED COUNT: 39.2 % (ref 36–46)
HEMATOCRIT (%) IN BLOOD BY AUTOMATED COUNT: 40.3 % (ref 36–46)
HEMATOCRIT (%) IN BLOOD BY AUTOMATED COUNT: 44.4 % (ref 36–46)
HEMATOCRIT (%) IN BLOOD BY AUTOMATED COUNT: 45.9 % (ref 36–46)
HEMATOCRIT (%) IN BLOOD BY AUTOMATED COUNT: NORMAL
HEMATOCRIT (%) IN BLOOD BY AUTOMATED COUNT: NORMAL
HEMOGLOBIN (G/DL) IN BLOOD: 11.1 G/DL (ref 12–16)
HEMOGLOBIN (G/DL) IN BLOOD: 11.8 G/DL (ref 12–16)
HEMOGLOBIN (G/DL) IN BLOOD: 12.2 G/DL (ref 12–16)
HEMOGLOBIN (G/DL) IN BLOOD: 13.7 G/DL (ref 12–16)
HEMOGLOBIN (G/DL) IN BLOOD: 14.3 G/DL (ref 12–16)
HEMOGLOBIN (G/DL) IN BLOOD: 5.4 G/DL (ref 12–16)
HEMOGLOBIN (G/DL) IN BLOOD: 5.5 G/DL (ref 12–16)
HEMOGLOBIN (G/DL) IN BLOOD: 5.7 G/DL (ref 12–16)
HEMOGLOBIN (G/DL) IN BLOOD: 6.3 G/DL (ref 12–16)
HEMOGLOBIN (G/DL) IN BLOOD: 6.5 G/DL (ref 12–16)
HEMOGLOBIN (G/DL) IN BLOOD: 7.2 G/DL (ref 12–16)
HEMOGLOBIN (G/DL) IN BLOOD: 7.4 G/DL (ref 12–16)
HEMOGLOBIN (G/DL) IN BLOOD: 7.7 G/DL (ref 12–16)
HEMOGLOBIN (G/DL) IN BLOOD: 7.9 G/DL (ref 12–16)
HEMOGLOBIN (G/DL) IN BLOOD: 7.9 G/DL (ref 12–16)
HEMOGLOBIN (G/DL) IN BLOOD: 8 G/DL (ref 12–16)
HEMOGLOBIN (G/DL) IN BLOOD: 8.2 G/DL (ref 12–16)
HEMOGLOBIN (G/DL) IN BLOOD: 8.5 G/DL (ref 12–16)
HEMOGLOBIN (G/DL) IN BLOOD: 9.1 G/DL (ref 12–16)
HEMOGLOBIN (G/DL) IN BLOOD: 9.6 G/DL (ref 12–16)
HEMOGLOBIN (G/DL) IN BLOOD: 9.8 G/DL (ref 12–16)
HEMOGLOBIN (G/DL) IN BLOOD: NORMAL
HEMOGLOBIN (G/DL) IN BLOOD: NORMAL
HEPATITIS A VIRUS IGM AB PRESENCE IN SER/PLAS BY IMMUNOASSAY: NONREACTIVE
HEPATITIS B VIRUS CORE IGM AB PRESENCE IN SER/PLAS BY IMMUNOASSY: NONREACTIVE
HEPATITIS B VIRUS SURFACE AB (MIU/ML) IN SERUM: >1000 MIU/ML
HEPATITIS B VIRUS SURFACE AG PRESENCE IN SERUM: NONREACTIVE
HEPATITIS B VIRUS SURFACE AG PRESENCE IN SERUM: NONREACTIVE
HEPATITIS BE ANTIBODY: NEGATIVE
HEPATITIS BE ANTIGEN: NEGATIVE
HEPATITIS C VIRUS AB PRESENCE IN SERUM: NONREACTIVE
HGB BLD-MCNC: 4.5 G/DL (ref 12–16)
HGB BLD-MCNC: 5.1 G/DL (ref 12–16)
HGB BLD-MCNC: 5.3 G/DL (ref 12–16)
HGB BLD-MCNC: 5.6 G/DL (ref 12–16)
HGB BLD-MCNC: 5.6 G/DL (ref 12–16)
HGB BLD-MCNC: 5.9 G/DL (ref 12–16)
HGB BLD-MCNC: 6.1 G/DL (ref 12–16)
HGB BLD-MCNC: 6.3 G/DL (ref 12–16)
HGB BLDV-MCNC: 6.4 G/DL (ref 12–16)
HGB RETIC QN: 36 PG (ref 28–38)
HIV 1/ 2 AG/AB SCREEN: NONREACTIVE
IMM GRANULOCYTES # BLD AUTO: 0.09 X10*3/UL (ref 0–0.7)
IMM GRANULOCYTES # BLD AUTO: 0.09 X10*3/UL (ref 0–0.7)
IMM GRANULOCYTES # BLD AUTO: 0.1 X10*3/UL (ref 0–0.7)
IMM GRANULOCYTES # BLD AUTO: 0.28 X10*3/UL (ref 0–0.7)
IMM GRANULOCYTES NFR BLD AUTO: 0.6 % (ref 0–0.9)
IMM GRANULOCYTES NFR BLD AUTO: 0.7 % (ref 0–0.9)
IMM GRANULOCYTES NFR BLD AUTO: 0.7 % (ref 0–0.9)
IMM GRANULOCYTES NFR BLD AUTO: 0.9 % (ref 0–0.9)
IMMATURE GRANULOCYTES/100 LEUKOCYTES IN BLOOD BY AUTOMATED COUNT: 0.7 % (ref 0–0.9)
IMMATURE RETIC FRACTION: 39.6 %
INR IN PPP BY COAGULATION ASSAY: 1.6 (ref 0.9–1.1)
INR IN PPP BY COAGULATION ASSAY: 2 (ref 0.9–1.1)
INR IN PPP BY COAGULATION ASSAY: 2.6 (ref 0.9–1.1)
INR IN PPP BY COAGULATION ASSAY: 3 (ref 0.9–1.1)
INR PPP: 1.3 (ref 0.9–1.1)
INR PPP: 1.3 (ref 0.9–1.1)
INR PPP: 1.5 (ref 0.9–1.1)
INTERPRETATION FOR ANTI-PLATELET FACTOR 4 ANTIBODY: NEGATIVE
IRON (UG/DL) IN SER/PLAS: 42 UG/DL (ref 35–150)
IRON BINDING CAPACITY (UG/DL) IN SER/PLAS: 192 UG/DL (ref 240–445)
IRON SATN MFR SERPL: ABNORMAL %
IRON SATURATION (%) IN SER/PLAS: 22 % (ref 25–45)
IRON SERPL-MCNC: 112 UG/DL (ref 35–150)
LACTATE (MMOL/L) IN SER/PLAS: 1.5 MMOL/L (ref 0.4–2)
LACTATE BLDV-SCNC: 0.9 MMOL/L (ref 0.4–2)
LDH SERPL L TO P-CCNC: 219 U/L (ref 84–246)
LDH SERPL L TO P-CCNC: 222 U/L (ref 84–246)
LEUKOCYTES (10*3/UL) IN BLOOD BY AUTOMATED COUNT: 10 X10E9/L (ref 4.4–11.3)
LEUKOCYTES (10*3/UL) IN BLOOD BY AUTOMATED COUNT: 10.4 X10E9/L (ref 4.4–11.3)
LEUKOCYTES (10*3/UL) IN BLOOD BY AUTOMATED COUNT: 10.6 X10E9/L (ref 4.4–11.3)
LEUKOCYTES (10*3/UL) IN BLOOD BY AUTOMATED COUNT: 10.8 X10E9/L (ref 4.4–11.3)
LEUKOCYTES (10*3/UL) IN BLOOD BY AUTOMATED COUNT: 11.2 X10E9/L (ref 4.4–11.3)
LEUKOCYTES (10*3/UL) IN BLOOD BY AUTOMATED COUNT: 11.4 X10E9/L (ref 4.4–11.3)
LEUKOCYTES (10*3/UL) IN BLOOD BY AUTOMATED COUNT: 11.7 X10E9/L (ref 4.4–11.3)
LEUKOCYTES (10*3/UL) IN BLOOD BY AUTOMATED COUNT: 11.7 X10E9/L (ref 4.4–11.3)
LEUKOCYTES (10*3/UL) IN BLOOD BY AUTOMATED COUNT: 12 X10E9/L (ref 4.4–11.3)
LEUKOCYTES (10*3/UL) IN BLOOD BY AUTOMATED COUNT: 12.2 X10E9/L (ref 4.4–11.3)
LEUKOCYTES (10*3/UL) IN BLOOD BY AUTOMATED COUNT: 12.4 X10E9/L (ref 4.4–11.3)
LEUKOCYTES (10*3/UL) IN BLOOD BY AUTOMATED COUNT: 13.8 X10E9/L (ref 4.4–11.3)
LEUKOCYTES (10*3/UL) IN BLOOD BY AUTOMATED COUNT: 14.7 X10E9/L (ref 4.4–11.3)
LEUKOCYTES (10*3/UL) IN BLOOD BY AUTOMATED COUNT: 15.3 X10E9/L (ref 4.4–11.3)
LEUKOCYTES (10*3/UL) IN BLOOD BY AUTOMATED COUNT: 15.5 X10E9/L (ref 4.4–11.3)
LEUKOCYTES (10*3/UL) IN BLOOD BY AUTOMATED COUNT: 16 X10E9/L (ref 4.4–11.3)
LEUKOCYTES (10*3/UL) IN BLOOD BY AUTOMATED COUNT: 17.1 X10E9/L (ref 4.4–11.3)
LEUKOCYTES (10*3/UL) IN BLOOD BY AUTOMATED COUNT: 18.1 X10E9/L (ref 4.4–11.3)
LEUKOCYTES (10*3/UL) IN BLOOD BY AUTOMATED COUNT: 18.2 X10E9/L (ref 4.4–11.3)
LEUKOCYTES (10*3/UL) IN BLOOD BY AUTOMATED COUNT: 4.7 X10E9/L (ref 4.4–11.3)
LEUKOCYTES (10*3/UL) IN BLOOD BY AUTOMATED COUNT: 7.5 X10E9/L (ref 4.4–11.3)
LEUKOCYTES (10*3/UL) IN BLOOD BY AUTOMATED COUNT: NORMAL
LEUKOCYTES (10*3/UL) IN BLOOD BY AUTOMATED COUNT: NORMAL
LYMPHOCYTES # BLD AUTO: 1.45 X10*3/UL (ref 1.2–4.8)
LYMPHOCYTES # BLD MANUAL: 0.4 X10*3/UL (ref 1.2–4.8)
LYMPHOCYTES # BLD MANUAL: 1.87 X10*3/UL (ref 1.2–4.8)
LYMPHOCYTES # BLD MANUAL: 1.96 X10*3/UL (ref 1.2–4.8)
LYMPHOCYTES (10*3/UL) IN BLOOD BY AUTOMATED COUNT: 0.49 X10E9/L (ref 1.2–4.8)
LYMPHOCYTES NFR BLD AUTO: 11.5 %
LYMPHOCYTES NFR BLD MANUAL: 13.6 %
LYMPHOCYTES NFR BLD MANUAL: 2.7 %
LYMPHOCYTES NFR BLD MANUAL: 6 %
LYMPHOCYTES/100 LEUKOCYTES IN BLOOD BY AUTOMATED COUNT: 3.2 % (ref 13–44)
MAGNESIUM (MG/DL) IN SER/PLAS: 1.85 MG/DL (ref 1.6–2.4)
MAGNESIUM (MG/DL) IN SER/PLAS: 1.86 MG/DL (ref 1.6–2.4)
MAGNESIUM (MG/DL) IN SER/PLAS: 1.89 MG/DL (ref 1.6–2.4)
MAGNESIUM (MG/DL) IN SER/PLAS: 1.91 MG/DL (ref 1.6–2.4)
MAGNESIUM (MG/DL) IN SER/PLAS: 1.94 MG/DL (ref 1.6–2.4)
MAGNESIUM (MG/DL) IN SER/PLAS: 2.12 MG/DL (ref 1.6–2.4)
MAGNESIUM (MG/DL) IN SER/PLAS: 2.13 MG/DL (ref 1.6–2.4)
MAGNESIUM (MG/DL) IN SER/PLAS: 2.23 MG/DL (ref 1.6–2.4)
MAGNESIUM (MG/DL) IN SER/PLAS: 2.27 MG/DL (ref 1.6–2.4)
MAGNESIUM (MG/DL) IN SER/PLAS: 2.64 MG/DL (ref 1.6–2.4)
MAGNESIUM SERPL-MCNC: 1.94 MG/DL (ref 1.6–2.4)
MAGNESIUM SERPL-MCNC: 2.03 MG/DL (ref 1.6–2.4)
MAGNESIUM SERPL-MCNC: 2.3 MG/DL (ref 1.6–2.4)
MAGNESIUM SERPL-MCNC: 2.69 MG/DL (ref 1.6–2.4)
MCH RBC QN AUTO: 29.3 PG (ref 26–34)
MCH RBC QN AUTO: 30.1 PG (ref 26–34)
MCH RBC QN AUTO: 30.3 PG (ref 26–34)
MCH RBC QN AUTO: 30.5 PG (ref 26–34)
MCH RBC QN AUTO: 31.5 PG (ref 26–34)
MCH RBC QN AUTO: 31.7 PG (ref 26–34)
MCH RBC QN AUTO: 32.2 PG (ref 26–34)
MCH RBC QN AUTO: 34.8 PG (ref 26–34)
MCHC RBC AUTO-ENTMCNC: 31.3 G/DL (ref 32–36)
MCHC RBC AUTO-ENTMCNC: 31.3 G/DL (ref 32–36)
MCHC RBC AUTO-ENTMCNC: 31.8 G/DL (ref 32–36)
MCHC RBC AUTO-ENTMCNC: 32.4 G/DL (ref 32–36)
MCHC RBC AUTO-ENTMCNC: 32.7 G/DL (ref 32–36)
MCHC RBC AUTO-ENTMCNC: 32.7 G/DL (ref 32–36)
MCHC RBC AUTO-ENTMCNC: 32.8 G/DL (ref 32–36)
MCHC RBC AUTO-ENTMCNC: 34.1 G/DL (ref 32–36)
MCV RBC AUTO: 101 FL (ref 80–100)
MCV RBC AUTO: 111 FL (ref 80–100)
MCV RBC AUTO: 89 FL (ref 80–100)
MCV RBC AUTO: 89 FL (ref 80–100)
MCV RBC AUTO: 92 FL (ref 80–100)
MCV RBC AUTO: 96 FL (ref 80–100)
MCV RBC AUTO: 97 FL (ref 80–100)
MCV RBC AUTO: 98 FL (ref 80–100)
MONOCYTES # BLD AUTO: 0.99 X10*3/UL (ref 0.1–1)
MONOCYTES # BLD MANUAL: 0.26 X10*3/UL (ref 0.1–1)
MONOCYTES # BLD MANUAL: 0.28 X10*3/UL (ref 0.1–1)
MONOCYTES # BLD MANUAL: 0.71 X10*3/UL (ref 0.1–1)
MONOCYTES (10*3/UL) IN BLOOD BY AUTOMATED COUNT: 0.56 X10E9/L (ref 0.1–1)
MONOCYTES NFR BLD AUTO: 7.8 %
MONOCYTES NFR BLD MANUAL: 0.9 %
MONOCYTES NFR BLD MANUAL: 1.8 %
MONOCYTES NFR BLD MANUAL: 4.9 %
MONOCYTES/100 LEUKOCYTES IN BLOOD BY AUTOMATED COUNT: 3.7 % (ref 2–10)
MYELOCYTES # BLD MANUAL: 0.17 X10*3/UL
MYELOCYTES # BLD MANUAL: 0.28 X10*3/UL
MYELOCYTES NFR BLD MANUAL: 0.9 %
MYELOCYTES NFR BLD MANUAL: 1.2 %
NATRIURETIC PEPTIDE B (PG/ML) IN SER/PLAS: >4700 PG/ML (ref 0–99)
NEUTROPHILS # BLD AUTO: 8.89 X10*3/UL (ref 1.2–7.7)
NEUTROPHILS (10*3/UL) IN BLOOD BY AUTOMATED COUNT: 13.25 X10E9/L (ref 1.2–7.7)
NEUTROPHILS NFR BLD AUTO: 70.4 %
NEUTROPHILS/100 LEUKOCYTES IN BLOOD BY AUTOMATED COUNT: 86.8 % (ref 40–80)
NEUTS SEG # BLD MANUAL: 12.33 X10*3/UL (ref 1.2–7)
NEUTS SEG # BLD MANUAL: 24.66 X10*3/UL (ref 1.2–7)
NEUTS SEG # BLD MANUAL: 8.54 X10*3/UL (ref 1.2–7)
NEUTS SEG NFR BLD MANUAL: 59.3 %
NEUTS SEG NFR BLD MANUAL: 79.3 %
NEUTS SEG NFR BLD MANUAL: 83.9 %
NRBC (PER 100 WBCS) BY AUTOMATED COUNT: 0 /100 WBC (ref 0–0)
NRBC (PER 100 WBCS) BY AUTOMATED COUNT: 0.1 /100 WBC (ref 0–0)
NRBC (PER 100 WBCS) BY AUTOMATED COUNT: 0.3 /100 WBC (ref 0–0)
NRBC (PER 100 WBCS) BY AUTOMATED COUNT: NORMAL
NRBC (PER 100 WBCS) BY AUTOMATED COUNT: NORMAL
NRBC BLD-RTO: 0 /100 WBCS (ref 0–0)
NRBC BLD-RTO: 0.3 /100 WBCS (ref 0–0)
NRBC BLD-RTO: 2.4 /100 WBCS (ref 0–0)
NRBC BLD-RTO: 2.8 /100 WBCS (ref 0–0)
NRBC BLD-RTO: 3.2 /100 WBCS (ref 0–0)
NRBC BLD-RTO: 3.5 /100 WBCS (ref 0–0)
OCCULT BLOOD, STOOL X1: POSITIVE
OXYHGB MFR BLDV: 83.1 % (ref 45–75)
PARATHYRIN INTACT (PG/ML) IN SER/PLAS: 183 PG/ML (ref 18.5–88)
PATH REV-IMMUNOHEMATOLOGY-PR30: NORMAL
PATH REVIEW - SERUM IMMUNOFIXATION: NORMAL
PATH REVIEW-SERUM PROTEIN ELECTROPHORESIS: NORMAL
PATIENT TEMPERATURE: 37 DEGREES C
PCO2 BLDV: 53 MM HG (ref 41–51)
PH BLDV: 7.39 PH (ref 7.33–7.43)
PHOSPHATE (MG/DL) IN SER/PLAS: 2.1 MG/DL (ref 2.5–4.9)
PHOSPHATE (MG/DL) IN SER/PLAS: 2.6 MG/DL (ref 2.5–4.9)
PHOSPHATE (MG/DL) IN SER/PLAS: 2.8 MG/DL (ref 2.5–4.9)
PHOSPHATE (MG/DL) IN SER/PLAS: 3 MG/DL (ref 2.5–4.9)
PHOSPHATE (MG/DL) IN SER/PLAS: 3.2 MG/DL (ref 2.5–4.9)
PHOSPHATE (MG/DL) IN SER/PLAS: 3.3 MG/DL (ref 2.5–4.9)
PHOSPHATE (MG/DL) IN SER/PLAS: 3.4 MG/DL (ref 2.5–4.9)
PHOSPHATE (MG/DL) IN SER/PLAS: 5.5 MG/DL (ref 2.5–4.9)
PHOSPHATE (MG/DL) IN SER/PLAS: 6.9 MG/DL (ref 2.5–4.9)
PHOSPHATE (MG/DL) IN SER/PLAS: 8.1 MG/DL (ref 2.5–4.9)
PHOSPHATE (MG/DL) IN SER/PLAS: 8.4 MG/DL (ref 2.5–4.9)
PHOSPHATE (MG/DL) IN SER/PLAS: 8.6 MG/DL (ref 2.5–4.9)
PHOSPHATE (MG/DL) IN SER/PLAS: 8.9 MG/DL (ref 2.5–4.9)
PHOSPHATE SERPL-MCNC: 1.4 MG/DL (ref 2.5–4.9)
PHOSPHATE SERPL-MCNC: 1.5 MG/DL (ref 2.5–4.9)
PHOSPHATE SERPL-MCNC: 1.6 MG/DL (ref 2.5–4.9)
PHOSPHATE SERPL-MCNC: 1.7 MG/DL (ref 2.5–4.9)
PHOSPHATE SERPL-MCNC: 2.2 MG/DL (ref 2.5–4.9)
PHOSPHATE SERPL-MCNC: 5 MG/DL (ref 2.5–4.9)
PLATELET # BLD AUTO: 102 X10*3/UL (ref 150–450)
PLATELET # BLD AUTO: 117 X10*3/UL (ref 150–450)
PLATELET # BLD AUTO: 146 X10*3/UL (ref 150–450)
PLATELET # BLD AUTO: 40 X10*3/UL (ref 150–450)
PLATELET # BLD AUTO: 40 X10*3/UL (ref 150–450)
PLATELET # BLD AUTO: 41 X10*3/UL (ref 150–450)
PLATELET # BLD AUTO: 47 X10*3/UL (ref 150–450)
PLATELET # BLD AUTO: 80 X10*3/UL (ref 150–450)
PLATELETS (10*3/UL) IN BLOOD AUTOMATED COUNT: 118 X10E9/L (ref 150–450)
PLATELETS (10*3/UL) IN BLOOD AUTOMATED COUNT: 121 X10E9/L (ref 150–450)
PLATELETS (10*3/UL) IN BLOOD AUTOMATED COUNT: 134 X10E9/L (ref 150–450)
PLATELETS (10*3/UL) IN BLOOD AUTOMATED COUNT: 221 X10E9/L (ref 150–450)
PLATELETS (10*3/UL) IN BLOOD AUTOMATED COUNT: 227 X10E9/L (ref 150–450)
PLATELETS (10*3/UL) IN BLOOD AUTOMATED COUNT: 257 X10E9/L (ref 150–450)
PLATELETS (10*3/UL) IN BLOOD AUTOMATED COUNT: 262 X10E9/L (ref 150–450)
PLATELETS (10*3/UL) IN BLOOD AUTOMATED COUNT: 290 X10E9/L (ref 150–450)
PLATELETS (10*3/UL) IN BLOOD AUTOMATED COUNT: 291 X10E9/L (ref 150–450)
PLATELETS (10*3/UL) IN BLOOD AUTOMATED COUNT: 380 X10E9/L (ref 150–450)
PLATELETS (10*3/UL) IN BLOOD AUTOMATED COUNT: 382 X10E9/L (ref 150–450)
PLATELETS (10*3/UL) IN BLOOD AUTOMATED COUNT: 48 X10E9/L (ref 150–450)
PLATELETS (10*3/UL) IN BLOOD AUTOMATED COUNT: 49 X10E9/L (ref 150–450)
PLATELETS (10*3/UL) IN BLOOD AUTOMATED COUNT: 50 X10E9/L (ref 150–450)
PLATELETS (10*3/UL) IN BLOOD AUTOMATED COUNT: 59 X10E9/L (ref 150–450)
PLATELETS (10*3/UL) IN BLOOD AUTOMATED COUNT: 67 X10E9/L (ref 150–450)
PLATELETS (10*3/UL) IN BLOOD AUTOMATED COUNT: 67 X10E9/L (ref 150–450)
PLATELETS (10*3/UL) IN BLOOD AUTOMATED COUNT: 70 X10E9/L (ref 150–450)
PLATELETS (10*3/UL) IN BLOOD AUTOMATED COUNT: 71 X10E9/L (ref 150–450)
PLATELETS (10*3/UL) IN BLOOD AUTOMATED COUNT: 87 X10E9/L (ref 150–450)
PLATELETS (10*3/UL) IN BLOOD AUTOMATED COUNT: 97 X10E9/L (ref 150–450)
PLATELETS (10*3/UL) IN BLOOD AUTOMATED COUNT: NORMAL
PLATELETS (10*3/UL) IN BLOOD AUTOMATED COUNT: NORMAL
PLATELETS.RETICULATED NFR BLD AUTO: 17.2 % (ref 1–6)
PMV BLD AUTO: 11.1 FL (ref 7.5–11.5)
PMV BLD AUTO: 11.7 FL (ref 7.5–11.5)
PMV BLD AUTO: 11.7 FL (ref 7.5–11.5)
PMV BLD AUTO: 11.8 FL (ref 7.5–11.5)
PMV BLD AUTO: 12.2 FL (ref 7.5–11.5)
PMV BLD AUTO: 12.4 FL (ref 7.5–11.5)
PMV BLD AUTO: 12.6 FL (ref 7.5–11.5)
PMV BLD AUTO: 13.1 FL (ref 7.5–11.5)
PO2 BLDV: 49 MM HG (ref 35–45)
POC CALCIUM IONIZED (MMOL/L) IN BLOOD: 1.14 MMOL/L (ref 1.1–1.33)
POC CALCIUM IONIZED (MMOL/L) IN BLOOD: 1.22 MMOL/L (ref 1.1–1.33)
POC CALCIUM IONIZED (MMOL/L) IN BLOOD: 1.37 MMOL/L (ref 1.1–1.33)
POC CALCIUM IONIZED (MMOL/L) IN BLOOD: NORMAL
POCT ANION GAP, ARTERIAL: 10 MMOL/L (ref 10–25)
POCT ANION GAP, ARTERIAL: 11 MMOL/L (ref 10–25)
POCT ANION GAP, ARTERIAL: 12 MMOL/L (ref 10–25)
POCT ANION GAP, ARTERIAL: 5 MMOL/L (ref 10–25)
POCT ANION GAP, ARTERIAL: 8 MMOL/L (ref 10–25)
POCT ANION GAP, ARTERIAL: ABNORMAL MMOL/L (ref 10–25)
POCT BASE EXCESS, ARTERIAL: -0.3 MMOL/L (ref -2–3)
POCT BASE EXCESS, ARTERIAL: -1.2 MMOL/L (ref -2–3)
POCT BASE EXCESS, ARTERIAL: -1.8 MMOL/L (ref -2–3)
POCT BASE EXCESS, ARTERIAL: -3.6 MMOL/L (ref -2–3)
POCT BASE EXCESS, ARTERIAL: 1.2 MMOL/L (ref -2–3)
POCT BASE EXCESS, ARTERIAL: 4.5 MMOL/L (ref -2–3)
POCT BASE EXCESS, ARTERIAL: 6.6 MMOL/L (ref -2–3)
POCT CHLORIDE, ARTERIAL: 101 MMOL/L (ref 98–107)
POCT CHLORIDE, ARTERIAL: 105 MMOL/L (ref 98–107)
POCT CHLORIDE, ARTERIAL: 94 MMOL/L (ref 98–107)
POCT CHLORIDE, ARTERIAL: 95 MMOL/L (ref 98–107)
POCT CHLORIDE, ARTERIAL: 96 MMOL/L (ref 98–107)
POCT CHLORIDE, ARTERIAL: ABNORMAL MMOL/L
POCT GLUCOSE, ARTERIAL: 251 MG/DL (ref 74–99)
POCT GLUCOSE, ARTERIAL: 46 MG/DL (ref 74–99)
POCT GLUCOSE, ARTERIAL: 73 MG/DL (ref 74–99)
POCT GLUCOSE, ARTERIAL: 77 MG/DL (ref 74–99)
POCT GLUCOSE, ARTERIAL: 79 MG/DL (ref 74–99)
POCT GLUCOSE, ARTERIAL: 88 MG/DL (ref 74–99)
POCT GLUCOSE: 104 MG/DL (ref 74–99)
POCT GLUCOSE: 105 MG/DL (ref 74–99)
POCT GLUCOSE: 110 MG/DL (ref 74–99)
POCT GLUCOSE: 111 MG/DL (ref 74–99)
POCT GLUCOSE: 111 MG/DL (ref 74–99)
POCT GLUCOSE: 117 MG/DL (ref 74–99)
POCT GLUCOSE: 119 MG/DL (ref 74–99)
POCT GLUCOSE: 120 MG/DL (ref 74–99)
POCT GLUCOSE: 128 MG/DL (ref 74–99)
POCT GLUCOSE: 153 MG/DL (ref 74–99)
POCT GLUCOSE: 36 MG/DL (ref 74–99)
POCT GLUCOSE: 42 MG/DL (ref 74–99)
POCT GLUCOSE: 47 MG/DL (ref 74–99)
POCT GLUCOSE: 65 MG/DL (ref 74–99)
POCT GLUCOSE: 69 MG/DL (ref 74–99)
POCT GLUCOSE: 70 MG/DL (ref 74–99)
POCT GLUCOSE: 71 MG/DL (ref 74–99)
POCT GLUCOSE: 72 MG/DL (ref 74–99)
POCT GLUCOSE: 73 MG/DL (ref 74–99)
POCT GLUCOSE: 73 MG/DL (ref 74–99)
POCT GLUCOSE: 76 MG/DL (ref 74–99)
POCT GLUCOSE: 76 MG/DL (ref 74–99)
POCT GLUCOSE: 77 MG/DL (ref 74–99)
POCT GLUCOSE: 77 MG/DL (ref 74–99)
POCT GLUCOSE: 78 MG/DL (ref 74–99)
POCT GLUCOSE: 78 MG/DL (ref 74–99)
POCT GLUCOSE: 79 MG/DL (ref 74–99)
POCT GLUCOSE: 80 MG/DL (ref 74–99)
POCT GLUCOSE: 81 MG/DL (ref 74–99)
POCT GLUCOSE: 83 MG/DL (ref 74–99)
POCT GLUCOSE: 83 MG/DL (ref 74–99)
POCT GLUCOSE: 84 MG/DL (ref 74–99)
POCT GLUCOSE: 85 MG/DL (ref 74–99)
POCT GLUCOSE: 86 MG/DL (ref 74–99)
POCT GLUCOSE: 87 MG/DL (ref 74–99)
POCT GLUCOSE: 87 MG/DL (ref 74–99)
POCT GLUCOSE: 89 MG/DL (ref 74–99)
POCT GLUCOSE: 93 MG/DL (ref 74–99)
POCT GLUCOSE: 94 MG/DL (ref 74–99)
POCT GLUCOSE: 98 MG/DL (ref 74–99)
POCT HCO3, ARTERIAL: 21.5 MMOL/L (ref 22–26)
POCT HCO3, ARTERIAL: 23.7 MMOL/L (ref 22–26)
POCT HCO3, ARTERIAL: 24.3 MMOL/L (ref 22–26)
POCT HCO3, ARTERIAL: 24.9 MMOL/L (ref 22–26)
POCT HCO3, ARTERIAL: 26.6 MMOL/L (ref 22–26)
POCT HCO3, ARTERIAL: 29.7 MMOL/L (ref 22–26)
POCT HCO3, ARTERIAL: 31.8 MMOL/L (ref 22–26)
POCT HEMATOCRIT, ARTERIAL: 16 % (ref 36–46)
POCT HEMATOCRIT, ARTERIAL: 16 % (ref 36–46)
POCT HEMATOCRIT, ARTERIAL: 17 % (ref 36–46)
POCT HEMATOCRIT, ARTERIAL: 21 % (ref 36–46)
POCT HEMOGLOBIN, ARTERIAL: 5.2 G/DL (ref 12–16)
POCT HEMOGLOBIN, ARTERIAL: 5.4 G/DL (ref 12–16)
POCT HEMOGLOBIN, ARTERIAL: 5.6 G/DL (ref 12–16)
POCT HEMOGLOBIN, ARTERIAL: 5.7 G/DL (ref 12–16)
POCT HEMOGLOBIN, ARTERIAL: 5.8 G/DL (ref 12–16)
POCT HEMOGLOBIN, ARTERIAL: 7 G/DL (ref 12–16)
POCT IONIZED CALCIUM, ARTERIAL: 1.03 MMOL/L (ref 1.1–1.33)
POCT IONIZED CALCIUM, ARTERIAL: 1.16 MMOL/L (ref 1.1–1.33)
POCT IONIZED CALCIUM, ARTERIAL: 1.16 MMOL/L (ref 1.1–1.33)
POCT IONIZED CALCIUM, ARTERIAL: 1.23 MMOL/L (ref 1.1–1.33)
POCT IONIZED CALCIUM, ARTERIAL: 1.26 MMOL/L (ref 1.1–1.33)
POCT IONIZED CALCIUM, ARTERIAL: 1.26 MMOL/L (ref 1.1–1.33)
POCT LACTATE, ARTERIAL: 0.7 MMOL/L (ref 0.4–2)
POCT LACTATE, ARTERIAL: 0.9 MMOL/L (ref 0.4–2)
POCT LACTATE, ARTERIAL: 1 MMOL/L (ref 0.4–2)
POCT LACTATE, ARTERIAL: 1.3 MMOL/L (ref 0.4–2)
POCT LACTATE, ARTERIAL: 1.6 MMOL/L (ref 0.4–2)
POCT LACTATE, ARTERIAL: 1.8 MMOL/L (ref 0.4–2)
POCT OXY HEMOGLOBIN, ARTERIAL: 88.2 % (ref 94–98)
POCT OXY HEMOGLOBIN, ARTERIAL: 92.8 % (ref 94–98)
POCT OXY HEMOGLOBIN, ARTERIAL: 94.6 % (ref 94–98)
POCT OXY HEMOGLOBIN, ARTERIAL: 95.3 % (ref 94–98)
POCT OXY HEMOGLOBIN, ARTERIAL: 95.4 % (ref 94–98)
POCT OXY HEMOGLOBIN, ARTERIAL: 96 % (ref 94–98)
POCT OXY HEMOGLOBIN, ARTERIAL: 96.9 % (ref 94–98)
POCT PCO2, ARTERIAL: 38 MMHG (ref 38–42)
POCT PCO2, ARTERIAL: 42 MMHG (ref 38–42)
POCT PCO2, ARTERIAL: 43 MMHG (ref 38–42)
POCT PCO2, ARTERIAL: 44 MMHG (ref 38–42)
POCT PCO2, ARTERIAL: 46 MMHG (ref 38–42)
POCT PCO2, ARTERIAL: 48 MMHG (ref 38–42)
POCT PCO2, ARTERIAL: 49 MMHG (ref 38–42)
POCT PH, ARTERIAL: 7.35 (ref 7.38–7.42)
POCT PH, ARTERIAL: 7.36 (ref 7.38–7.42)
POCT PH, ARTERIAL: 7.36 (ref 7.38–7.42)
POCT PH, ARTERIAL: 7.37 (ref 7.38–7.42)
POCT PH, ARTERIAL: 7.37 (ref 7.38–7.42)
POCT PH, ARTERIAL: 7.4 (ref 7.38–7.42)
POCT PH, ARTERIAL: 7.42 (ref 7.38–7.42)
POCT PO2, ARTERIAL: 101 MMHG (ref 85–95)
POCT PO2, ARTERIAL: 167 MMHG (ref 85–95)
POCT PO2, ARTERIAL: 212 MMHG (ref 85–95)
POCT PO2, ARTERIAL: 59 MMHG (ref 85–95)
POCT PO2, ARTERIAL: 67 MMHG (ref 85–95)
POCT PO2, ARTERIAL: 67 MMHG (ref 85–95)
POCT PO2, ARTERIAL: 96 MMHG (ref 85–95)
POCT POTASSIUM, ARTERIAL: 3.4 MMOL/L (ref 3.5–5.3)
POCT POTASSIUM, ARTERIAL: 3.8 MMOL/L (ref 3.5–5.3)
POCT POTASSIUM, ARTERIAL: 3.8 MMOL/L (ref 3.5–5.3)
POCT POTASSIUM, ARTERIAL: 4.4 MMOL/L (ref 3.5–5.3)
POCT POTASSIUM, ARTERIAL: 4.4 MMOL/L (ref 3.5–5.3)
POCT POTASSIUM, ARTERIAL: 4.5 MMOL/L (ref 3.5–5.3)
POCT SO2, ARTERIAL: 100 % (ref 94–100)
POCT SO2, ARTERIAL: 91 % (ref 94–100)
POCT SO2, ARTERIAL: 95 % (ref 94–100)
POCT SO2, ARTERIAL: 97 % (ref 94–100)
POCT SO2, ARTERIAL: 99 % (ref 94–100)
POCT SODIUM, ARTERIAL: 126 MMOL/L (ref 136–145)
POCT SODIUM, ARTERIAL: 126 MMOL/L (ref 136–145)
POCT SODIUM, ARTERIAL: 128 MMOL/L (ref 136–145)
POCT SODIUM, ARTERIAL: 131 MMOL/L (ref 136–145)
POCT SODIUM, ARTERIAL: 134 MMOL/L (ref 136–145)
POCT SODIUM, ARTERIAL: 135 MMOL/L (ref 136–145)
POLYCHROMASIA BLD QL SMEAR: ABNORMAL
POTASSIUM (MMOL/L) IN SER/PLAS: 3.4 MMOL/L (ref 3.5–5.3)
POTASSIUM (MMOL/L) IN SER/PLAS: 3.8 MMOL/L (ref 3.5–5.3)
POTASSIUM (MMOL/L) IN SER/PLAS: 3.8 MMOL/L (ref 3.5–5.3)
POTASSIUM (MMOL/L) IN SER/PLAS: 3.9 MMOL/L (ref 3.5–5.3)
POTASSIUM (MMOL/L) IN SER/PLAS: 3.9 MMOL/L (ref 3.5–5.3)
POTASSIUM (MMOL/L) IN SER/PLAS: 4 MMOL/L (ref 3.5–5.3)
POTASSIUM (MMOL/L) IN SER/PLAS: 4.1 MMOL/L (ref 3.5–5.3)
POTASSIUM (MMOL/L) IN SER/PLAS: 4.1 MMOL/L (ref 3.5–5.3)
POTASSIUM (MMOL/L) IN SER/PLAS: 4.3 MMOL/L (ref 3.5–5.3)
POTASSIUM (MMOL/L) IN SER/PLAS: 4.3 MMOL/L (ref 3.5–5.3)
POTASSIUM (MMOL/L) IN SER/PLAS: 4.4 MMOL/L (ref 3.5–5.3)
POTASSIUM (MMOL/L) IN SER/PLAS: 4.5 MMOL/L (ref 3.5–5.3)
POTASSIUM (MMOL/L) IN SER/PLAS: 4.6 MMOL/L (ref 3.5–5.3)
POTASSIUM (MMOL/L) IN SER/PLAS: 5 MMOL/L (ref 3.5–5.3)
POTASSIUM (MMOL/L) IN SER/PLAS: 5.1 MMOL/L (ref 3.5–5.3)
POTASSIUM (MMOL/L) IN SER/PLAS: 5.3 MMOL/L (ref 3.5–5.3)
POTASSIUM (MMOL/L) IN SER/PLAS: 5.8 MMOL/L (ref 3.5–5.3)
POTASSIUM (MMOL/L) IN SER/PLAS: 6.1 MMOL/L (ref 3.5–5.3)
POTASSIUM (MMOL/L) IN SER/PLAS: 6.3 MMOL/L (ref 3.5–5.3)
POTASSIUM (MMOL/L) IN SER/PLAS: 6.7 MMOL/L (ref 3.5–5.3)
POTASSIUM (MMOL/L) IN SER/PLAS: NORMAL
POTASSIUM (MMOL/L) IN SER/PLAS: NORMAL
POTASSIUM BLDV-SCNC: 3.5 MMOL/L (ref 3.5–5.3)
POTASSIUM SERPL-SCNC: 3.7 MMOL/L (ref 3.5–5.3)
POTASSIUM SERPL-SCNC: 3.8 MMOL/L (ref 3.5–5.3)
POTASSIUM SERPL-SCNC: 3.9 MMOL/L (ref 3.5–5.3)
POTASSIUM SERPL-SCNC: 4.1 MMOL/L (ref 3.5–5.3)
POTASSIUM SERPL-SCNC: 4.2 MMOL/L (ref 3.5–5.3)
POTASSIUM SERPL-SCNC: 5.3 MMOL/L (ref 3.5–5.3)
PREALBUMIN (MG/DL) IN SERUM OR PLASMA: 7.7 MG/DL (ref 18–40)
PROCALCITONIN: 4.23 NG/ML
PROCALCITONIN: 4.77 NG/ML
PRODUCT BLOOD TYPE: 5100
PRODUCT BLOOD TYPE: 5100
PRODUCT CODE: NORMAL
PRODUCT CODE: NORMAL
PROT SERPL-MCNC: 6.1 G/DL (ref 6.4–8.2)
PROTEIN ELECTROPHORESIS INTERPRETATION: ABNORMAL
PROTEIN TOTAL: 4.7 G/DL (ref 6.4–8.2)
PROTEIN TOTAL: 4.8 G/DL (ref 6.4–8.2)
PROTEIN TOTAL: 5.5 G/DL (ref 6.4–8.2)
PROTEIN TOTAL: 5.5 G/DL (ref 6.4–8.2)
PROTEIN TOTAL: 6.9 G/DL (ref 6.4–8.2)
PROTEIN TOTAL: 7.5 G/DL (ref 6.4–8.2)
PROTEIN TOTAL: 7.7 G/DL (ref 6.4–8.2)
PROTEIN TOTAL: 8.8 G/DL (ref 6.4–8.2)
PROTEIN TOTAL: 8.8 G/DL (ref 6.4–8.2)
PROTEIN TOTAL: 9.6 G/DL (ref 6.4–8.2)
PROTEIN TOTAL: 9.6 G/DL (ref 6.4–8.2)
PROTEIN TOTAL: NORMAL
PROTEIN TOTAL: NORMAL
PROTHROMBIN TIME (PT) IN PPP BY COAGULATION ASSAY: 18.3 SEC (ref 9.8–12.8)
PROTHROMBIN TIME (PT) IN PPP BY COAGULATION ASSAY: 22.7 SEC (ref 9.8–12.8)
PROTHROMBIN TIME (PT) IN PPP BY COAGULATION ASSAY: 30.1 SEC (ref 9.8–12.8)
PROTHROMBIN TIME (PT) IN PPP BY COAGULATION ASSAY: 34.3 SEC (ref 9.8–12.8)
PROTHROMBIN TIME: 14.8 SECONDS (ref 9.8–12.8)
PROTHROMBIN TIME: 15.2 SECONDS (ref 9.8–12.8)
PROTHROMBIN TIME: 17 SECONDS (ref 9.8–12.8)
RBC # BLD AUTO: 1.43 X10*6/UL (ref 4–5.2)
RBC # BLD AUTO: 1.61 X10*6/UL (ref 4–5.2)
RBC # BLD AUTO: 1.61 X10*6/UL (ref 4–5.2)
RBC # BLD AUTO: 1.74 X10*6/UL (ref 4–5.2)
RBC # BLD AUTO: 1.76 X10*6/UL (ref 4–5.2)
RBC # BLD AUTO: 1.95 X10*6/UL (ref 4–5.2)
RBC # BLD AUTO: 2 X10*6/UL (ref 4–5.2)
RBC # BLD AUTO: 2.15 X10*6/UL (ref 4–5.2)
RBC MORPH BLD: ABNORMAL
RETICS #: 0.15 X10*6/UL (ref 0.02–0.08)
RETICS/RBC NFR AUTO: 9.3 % (ref 0.5–2)
RH FACTOR (ANTIGEN D): NORMAL
RH FACTOR (ANTIGEN D): NORMAL
RH FACTOR: NORMAL
SAO2 % BLDV: 85 % (ref 45–75)
SEDIMENTATION RATE, ERYTHROCYTE: <1 MM/H (ref 0–30)
SERUM AND PLATELET FACTOR 4: 0.25 OD UNITS
SERUM IMMUNOFIXATION INTERPRETATION: NORMAL
SODIUM (MMOL/L) IN SER/PLAS: 129 MMOL/L (ref 136–145)
SODIUM (MMOL/L) IN SER/PLAS: 130 MMOL/L (ref 136–145)
SODIUM (MMOL/L) IN SER/PLAS: 131 MMOL/L (ref 136–145)
SODIUM (MMOL/L) IN SER/PLAS: 132 MMOL/L (ref 136–145)
SODIUM (MMOL/L) IN SER/PLAS: 133 MMOL/L (ref 136–145)
SODIUM (MMOL/L) IN SER/PLAS: 133 MMOL/L (ref 136–145)
SODIUM (MMOL/L) IN SER/PLAS: 134 MMOL/L (ref 136–145)
SODIUM (MMOL/L) IN SER/PLAS: 135 MMOL/L (ref 136–145)
SODIUM (MMOL/L) IN SER/PLAS: 136 MMOL/L (ref 136–145)
SODIUM (MMOL/L) IN SER/PLAS: 137 MMOL/L (ref 136–145)
SODIUM (MMOL/L) IN SER/PLAS: 137 MMOL/L (ref 136–145)
SODIUM (MMOL/L) IN SER/PLAS: 138 MMOL/L (ref 136–145)
SODIUM (MMOL/L) IN SER/PLAS: 139 MMOL/L (ref 136–145)
SODIUM (MMOL/L) IN SER/PLAS: 140 MMOL/L (ref 136–145)
SODIUM (MMOL/L) IN SER/PLAS: 140 MMOL/L (ref 136–145)
SODIUM (MMOL/L) IN SER/PLAS: NORMAL
SODIUM (MMOL/L) IN SER/PLAS: NORMAL
SODIUM BLDV-SCNC: 138 MMOL/L (ref 136–145)
SODIUM SERPL-SCNC: 140 MMOL/L (ref 136–145)
SODIUM SERPL-SCNC: 142 MMOL/L (ref 136–145)
SODIUM SERPL-SCNC: 142 MMOL/L (ref 136–145)
SODIUM SERPL-SCNC: 145 MMOL/L (ref 136–145)
SODIUM SERPL-SCNC: 147 MMOL/L (ref 136–145)
SODIUM SERPL-SCNC: 148 MMOL/L (ref 136–145)
STAPH/MRSA SCREEN, CULTURE: NORMAL
THYROTROPIN (MIU/L) IN SER/PLAS BY DETECTION LIMIT <= 0.05 MIU/L: 19.32 MIU/L (ref 0.44–3.98)
THYROXINE (T4) FREE (NG/DL) IN SER/PLAS: 0.75 NG/DL (ref 0.78–1.48)
TIBC SERPL-MCNC: ABNORMAL UG/DL
TISSUE/WOUND CULTURE/SMEAR: NORMAL
TOTAL CELLS COUNTED BLD: 112
TOTAL CELLS COUNTED BLD: 116
TOTAL CELLS COUNTED BLD: 81
TROPONIN I, HIGH SENSITIVITY: 353 NG/L (ref 0–34)
TROPONIN I, HIGH SENSITIVITY: 406 NG/L (ref 0–34)
UIBC SERPL-MCNC: <55 UG/DL (ref 110–370)
UNIT ABO: NORMAL
UNIT ABO: NORMAL
UNIT NUMBER: NORMAL
UNIT NUMBER: NORMAL
UNIT RH: NORMAL
UNIT RH: NORMAL
UNIT VOLUME: 350
UNIT VOLUME: 350
UREA NITROGEN (MG/DL) IN SER/PLAS: 10 MG/DL (ref 6–23)
UREA NITROGEN (MG/DL) IN SER/PLAS: 101 MG/DL (ref 6–23)
UREA NITROGEN (MG/DL) IN SER/PLAS: 107 MG/DL (ref 6–23)
UREA NITROGEN (MG/DL) IN SER/PLAS: 107 MG/DL (ref 6–23)
UREA NITROGEN (MG/DL) IN SER/PLAS: 108 MG/DL (ref 6–23)
UREA NITROGEN (MG/DL) IN SER/PLAS: 113 MG/DL (ref 6–23)
UREA NITROGEN (MG/DL) IN SER/PLAS: 118 MG/DL (ref 6–23)
UREA NITROGEN (MG/DL) IN SER/PLAS: 12 MG/DL (ref 6–23)
UREA NITROGEN (MG/DL) IN SER/PLAS: 14 MG/DL (ref 6–23)
UREA NITROGEN (MG/DL) IN SER/PLAS: 17 MG/DL (ref 6–23)
UREA NITROGEN (MG/DL) IN SER/PLAS: 21 MG/DL (ref 6–23)
UREA NITROGEN (MG/DL) IN SER/PLAS: 31 MG/DL (ref 6–23)
UREA NITROGEN (MG/DL) IN SER/PLAS: 33 MG/DL (ref 6–23)
UREA NITROGEN (MG/DL) IN SER/PLAS: 35 MG/DL (ref 6–23)
UREA NITROGEN (MG/DL) IN SER/PLAS: 36 MG/DL (ref 6–23)
UREA NITROGEN (MG/DL) IN SER/PLAS: 38 MG/DL (ref 6–23)
UREA NITROGEN (MG/DL) IN SER/PLAS: 43 MG/DL (ref 6–23)
UREA NITROGEN (MG/DL) IN SER/PLAS: 45 MG/DL (ref 6–23)
UREA NITROGEN (MG/DL) IN SER/PLAS: 47 MG/DL (ref 6–23)
UREA NITROGEN (MG/DL) IN SER/PLAS: 50 MG/DL (ref 6–23)
UREA NITROGEN (MG/DL) IN SER/PLAS: 57 MG/DL (ref 6–23)
UREA NITROGEN (MG/DL) IN SER/PLAS: 66 MG/DL (ref 6–23)
UREA NITROGEN (MG/DL) IN SER/PLAS: 73 MG/DL (ref 6–23)
UREA NITROGEN (MG/DL) IN SER/PLAS: 8 MG/DL (ref 6–23)
UREA NITROGEN (MG/DL) IN SER/PLAS: 9 MG/DL (ref 6–23)
UREA NITROGEN (MG/DL) IN SER/PLAS: NORMAL
UREA NITROGEN (MG/DL) IN SER/PLAS: NORMAL
VANCOMYCIN (UG/ML) IN SER/PLAS: 10.1 UG/ML
WBC # BLD AUTO: 12.6 X10*3/UL (ref 4.4–11.3)
WBC # BLD AUTO: 14.4 X10*3/UL (ref 4.4–11.3)
WBC # BLD AUTO: 14.7 X10*3/UL (ref 4.4–11.3)
WBC # BLD AUTO: 15.1 X10*3/UL (ref 4.4–11.3)
WBC # BLD AUTO: 21.9 X10*3/UL (ref 4.4–11.3)
WBC # BLD AUTO: 23 X10*3/UL (ref 4.4–11.3)
WBC # BLD AUTO: 26.6 X10*3/UL (ref 4.4–11.3)
WBC # BLD AUTO: 31.1 X10*3/UL (ref 4.4–11.3)
XM INTEP: NORMAL
XM INTEP: NORMAL

## 2023-01-01 PROCEDURE — C9113 INJ PANTOPRAZOLE SODIUM, VIA: HCPCS

## 2023-01-01 PROCEDURE — 9990 CHARGE CONVERSION: Mod: 91

## 2023-01-01 PROCEDURE — 2500000005 HC RX 250 GENERAL PHARMACY W/O HCPCS: Performed by: SURGERY

## 2023-01-01 PROCEDURE — 82947 ASSAY GLUCOSE BLOOD QUANT: CPT | Mod: 91

## 2023-01-01 PROCEDURE — P9016 RBC LEUKOCYTES REDUCED: HCPCS

## 2023-01-01 PROCEDURE — 2500000001 HC RX 250 WO HCPCS SELF ADMINISTERED DRUGS (ALT 637 FOR MEDICARE OP): Performed by: SURGERY

## 2023-01-01 PROCEDURE — 83540 ASSAY OF IRON: CPT | Performed by: STUDENT IN AN ORGANIZED HEALTH CARE EDUCATION/TRAINING PROGRAM

## 2023-01-01 PROCEDURE — 86860 RBC ANTIBODY ELUTION: CPT

## 2023-01-01 PROCEDURE — 82306 VITAMIN D 25 HYDROXY: CPT

## 2023-01-01 PROCEDURE — 87070 CULTURE OTHR SPECIMN AEROBIC: CPT

## 2023-01-01 PROCEDURE — 2500000004 HC RX 250 GENERAL PHARMACY W/ HCPCS (ALT 636 FOR OP/ED)

## 2023-01-01 PROCEDURE — 82805 BLOOD GASES W/O2 SATURATION: CPT | Mod: 91

## 2023-01-01 PROCEDURE — 82435 ASSAY OF BLOOD CHLORIDE: CPT | Mod: 91

## 2023-01-01 PROCEDURE — 85027 COMPLETE CBC AUTOMATED: CPT

## 2023-01-01 PROCEDURE — 86704 HEP B CORE ANTIBODY TOTAL: CPT | Performed by: INTERNAL MEDICINE

## 2023-01-01 PROCEDURE — 97166 OT EVAL MOD COMPLEX 45 MIN: CPT | Mod: GO

## 2023-01-01 PROCEDURE — 83735 ASSAY OF MAGNESIUM: CPT | Performed by: INTERNAL MEDICINE

## 2023-01-01 PROCEDURE — 85018 HEMOGLOBIN: CPT | Mod: 91

## 2023-01-01 PROCEDURE — 9990 CHARGE CONVERSION: Mod: GP

## 2023-01-01 PROCEDURE — 2500000001 HC RX 250 WO HCPCS SELF ADMINISTERED DRUGS (ALT 637 FOR MEDICARE OP)

## 2023-01-01 PROCEDURE — 85610 PROTHROMBIN TIME: CPT

## 2023-01-01 PROCEDURE — 82330 ASSAY OF CALCIUM: CPT

## 2023-01-01 PROCEDURE — 2500000004 HC RX 250 GENERAL PHARMACY W/ HCPCS (ALT 636 FOR OP/ED): Performed by: SURGERY

## 2023-01-01 PROCEDURE — 86880 COOMBS TEST DIRECT: CPT

## 2023-01-01 PROCEDURE — 2500000001 HC RX 250 WO HCPCS SELF ADMINISTERED DRUGS (ALT 637 FOR MEDICARE OP): Performed by: INTERNAL MEDICINE

## 2023-01-01 PROCEDURE — 1100000001 HC PRIVATE ROOM DAILY

## 2023-01-01 PROCEDURE — 85007 BL SMEAR W/DIFF WBC COUNT: CPT | Performed by: INTERNAL MEDICINE

## 2023-01-01 PROCEDURE — 2500000004 HC RX 250 GENERAL PHARMACY W/ HCPCS (ALT 636 FOR OP/ED): Performed by: INTERNAL MEDICINE

## 2023-01-01 PROCEDURE — 82947 ASSAY GLUCOSE BLOOD QUANT: CPT

## 2023-01-01 PROCEDURE — 36415 COLL VENOUS BLD VENIPUNCTURE: CPT | Performed by: INTERNAL MEDICINE

## 2023-01-01 PROCEDURE — 80053 COMPREHEN METABOLIC PANEL: CPT

## 2023-01-01 PROCEDURE — 96372 THER/PROPH/DIAG INJ SC/IM: CPT | Performed by: INTERNAL MEDICINE

## 2023-01-01 PROCEDURE — 84443 ASSAY THYROID STIM HORMONE: CPT

## 2023-01-01 PROCEDURE — 99232 SBSQ HOSP IP/OBS MODERATE 35: CPT | Performed by: INTERNAL MEDICINE

## 2023-01-01 PROCEDURE — 84145 PROCALCITONIN (PCT): CPT | Mod: 90

## 2023-01-01 PROCEDURE — 74018 RADEX ABDOMEN 1 VIEW: CPT

## 2023-01-01 PROCEDURE — 82746 ASSAY OF FOLIC ACID SERUM: CPT

## 2023-01-01 PROCEDURE — 86901 BLOOD TYPING SEROLOGIC RH(D): CPT

## 2023-01-01 PROCEDURE — 80069 RENAL FUNCTION PANEL: CPT

## 2023-01-01 PROCEDURE — 05PY0JZ REMOVAL OF SYNTHETIC SUBSTITUTE FROM UPPER VEIN, OPEN APPROACH: ICD-10-PCS | Performed by: SURGERY

## 2023-01-01 PROCEDURE — C9113 INJ PANTOPRAZOLE SODIUM, VIA: HCPCS | Performed by: SURGERY

## 2023-01-01 PROCEDURE — 86900 BLOOD TYPING SEROLOGIC ABO: CPT

## 2023-01-01 PROCEDURE — 80069 RENAL FUNCTION PANEL: CPT | Performed by: INTERNAL MEDICINE

## 2023-01-01 PROCEDURE — P9017 PLASMA 1 DONOR FRZ W/IN 8 HR: HCPCS

## 2023-01-01 PROCEDURE — 99233 SBSQ HOSP IP/OBS HIGH 50: CPT | Performed by: INTERNAL MEDICINE

## 2023-01-01 PROCEDURE — 80074 ACUTE HEPATITIS PANEL: CPT

## 2023-01-01 PROCEDURE — 87206 SMEAR FLUORESCENT/ACID STAI: CPT

## 2023-01-01 PROCEDURE — 03PY0JZ REMOVAL OF SYNTHETIC SUBSTITUTE FROM UPPER ARTERY, OPEN APPROACH: ICD-10-PCS | Performed by: SURGERY

## 2023-01-01 PROCEDURE — 87116 MYCOBACTERIA CULTURE: CPT

## 2023-01-01 PROCEDURE — 2060000001 HC INTERMEDIATE ICU ROOM DAILY

## 2023-01-01 PROCEDURE — 71045 X-RAY EXAM CHEST 1 VIEW: CPT

## 2023-01-01 PROCEDURE — 99233 SBSQ HOSP IP/OBS HIGH 50: CPT | Performed by: NURSE PRACTITIONER

## 2023-01-01 PROCEDURE — 86870 RBC ANTIBODY IDENTIFICATION: CPT

## 2023-01-01 PROCEDURE — 85027 COMPLETE CBC AUTOMATED: CPT | Performed by: INTERNAL MEDICINE

## 2023-01-01 PROCEDURE — 85025 COMPLETE CBC W/AUTO DIFF WBC: CPT

## 2023-01-01 PROCEDURE — 87389 HIV-1 AG W/HIV-1&-2 AB AG IA: CPT

## 2023-01-01 PROCEDURE — P9047 ALBUMIN (HUMAN), 25%, 50ML: HCPCS

## 2023-01-01 PROCEDURE — 99233 SBSQ HOSP IP/OBS HIGH 50: CPT | Performed by: STUDENT IN AN ORGANIZED HEALTH CARE EDUCATION/TRAINING PROGRAM

## 2023-01-01 PROCEDURE — 83735 ASSAY OF MAGNESIUM: CPT

## 2023-01-01 PROCEDURE — 36430 TRANSFUSION BLD/BLD COMPNT: CPT

## 2023-01-01 PROCEDURE — 82330 ASSAY OF CALCIUM: CPT | Mod: 91

## 2023-01-01 PROCEDURE — 87040 BLOOD CULTURE FOR BACTERIA: CPT

## 2023-01-01 PROCEDURE — C1750 CATH, HEMODIALYSIS,LONG-TERM: HCPCS

## 2023-01-01 PROCEDURE — 9990 CHARGE CONVERSION

## 2023-01-01 PROCEDURE — 84100 ASSAY OF PHOSPHORUS: CPT

## 2023-01-01 PROCEDURE — 85379 FIBRIN DEGRADATION QUANT: CPT | Performed by: INTERNAL MEDICINE

## 2023-01-01 PROCEDURE — 84295 ASSAY OF SERUM SODIUM: CPT | Performed by: INTERNAL MEDICINE

## 2023-01-01 PROCEDURE — 85652 RBC SED RATE AUTOMATED: CPT

## 2023-01-01 PROCEDURE — 85730 THROMBOPLASTIN TIME PARTIAL: CPT | Performed by: INTERNAL MEDICINE

## 2023-01-01 PROCEDURE — 87205 SMEAR GRAM STAIN: CPT

## 2023-01-01 PROCEDURE — 93325 DOPPLER ECHO COLOR FLOW MAPG: CPT

## 2023-01-01 PROCEDURE — 87522 HEPATITIS C REVRS TRNSCRPJ: CPT

## 2023-01-01 PROCEDURE — 84132 ASSAY OF SERUM POTASSIUM: CPT | Mod: 91

## 2023-01-01 PROCEDURE — 84100 ASSAY OF PHOSPHORUS: CPT | Performed by: INTERNAL MEDICINE

## 2023-01-01 PROCEDURE — 83615 LACTATE (LD) (LDH) ENZYME: CPT | Performed by: INTERNAL MEDICINE

## 2023-01-01 PROCEDURE — 85730 THROMBOPLASTIN TIME PARTIAL: CPT | Performed by: STUDENT IN AN ORGANIZED HEALTH CARE EDUCATION/TRAINING PROGRAM

## 2023-01-01 PROCEDURE — 86971 RBC PRETX INCUBATJ W/ENZYMES: CPT

## 2023-01-01 PROCEDURE — 99232 SBSQ HOSP IP/OBS MODERATE 35: CPT

## 2023-01-01 PROCEDURE — 94645 CONT INHLJ TX EACH ADDL HOUR: CPT

## 2023-01-01 PROCEDURE — 84484 ASSAY OF TROPONIN QUANT: CPT

## 2023-01-01 PROCEDURE — 83605 ASSAY OF LACTIC ACID: CPT | Mod: 91

## 2023-01-01 PROCEDURE — 85610 PROTHROMBIN TIME: CPT | Performed by: INTERNAL MEDICINE

## 2023-01-01 PROCEDURE — 36558 INSERT TUNNELED CV CATH: CPT | Performed by: RADIOLOGY

## 2023-01-01 PROCEDURE — 93308 TTE F-UP OR LMTD: CPT

## 2023-01-01 PROCEDURE — 80053 COMPREHEN METABOLIC PANEL: CPT | Performed by: INTERNAL MEDICINE

## 2023-01-01 PROCEDURE — P9037 PLATE PHERES LEUKOREDU IRRAD: HCPCS

## 2023-01-01 PROCEDURE — 84134 ASSAY OF PREALBUMIN: CPT

## 2023-01-01 PROCEDURE — 85025 COMPLETE CBC W/AUTO DIFF WBC: CPT | Performed by: INTERNAL MEDICINE

## 2023-01-01 PROCEDURE — 86850 RBC ANTIBODY SCREEN: CPT

## 2023-01-01 PROCEDURE — 2500000004 HC RX 250 GENERAL PHARMACY W/ HCPCS (ALT 636 FOR OP/ED): Performed by: RADIOLOGY

## 2023-01-01 PROCEDURE — 86922 COMPATIBILITY TEST ANTIGLOB: CPT

## 2023-01-01 PROCEDURE — 0DJ08ZZ INSPECTION OF UPPER INTESTINAL TRACT, VIA NATURAL OR ARTIFICIAL OPENING ENDOSCOPIC: ICD-10-PCS | Performed by: INTERNAL MEDICINE

## 2023-01-01 PROCEDURE — 84295 ASSAY OF SERUM SODIUM: CPT | Mod: 91

## 2023-01-01 PROCEDURE — 83010 ASSAY OF HAPTOGLOBIN QUANT: CPT | Performed by: INTERNAL MEDICINE

## 2023-01-01 PROCEDURE — 97161 PT EVAL LOW COMPLEX 20 MIN: CPT | Mod: GP

## 2023-01-01 PROCEDURE — 77001 FLUOROGUIDE FOR VEIN DEVICE: CPT | Performed by: RADIOLOGY

## 2023-01-01 PROCEDURE — 86140 C-REACTIVE PROTEIN: CPT

## 2023-01-01 PROCEDURE — S0166 INJ OLANZAPINE 2.5MG: HCPCS | Performed by: INTERNAL MEDICINE

## 2023-01-01 PROCEDURE — 87075 CULTR BACTERIA EXCEPT BLOOD: CPT

## 2023-01-01 PROCEDURE — 99232 SBSQ HOSP IP/OBS MODERATE 35: CPT | Performed by: NURSE PRACTITIONER

## 2023-01-01 PROCEDURE — 82270 OCCULT BLOOD FECES: CPT

## 2023-01-01 PROCEDURE — 0QB10ZZ EXCISION OF SACRUM, OPEN APPROACH: ICD-10-PCS | Performed by: SURGERY

## 2023-01-01 PROCEDURE — 97530 THERAPEUTIC ACTIVITIES: CPT | Mod: GO

## 2023-01-01 PROCEDURE — 36558 INSERT TUNNELED CV CATH: CPT | Mod: RT | Performed by: RADIOLOGY

## 2023-01-01 PROCEDURE — 96374 THER/PROPH/DIAG INJ IV PUSH: CPT

## 2023-01-01 PROCEDURE — 83605 ASSAY OF LACTIC ACID: CPT

## 2023-01-01 PROCEDURE — 83735 ASSAY OF MAGNESIUM: CPT | Mod: 91

## 2023-01-01 PROCEDURE — 85027 COMPLETE CBC AUTOMATED: CPT | Mod: 91

## 2023-01-01 PROCEDURE — 87040 BLOOD CULTURE FOR BACTERIA: CPT | Mod: 59

## 2023-01-01 PROCEDURE — 43235 EGD DIAGNOSTIC BRUSH WASH: CPT

## 2023-01-01 PROCEDURE — 2500000004 HC RX 250 GENERAL PHARMACY W/ HCPCS (ALT 636 FOR OP/ED): Performed by: STUDENT IN AN ORGANIZED HEALTH CARE EDUCATION/TRAINING PROGRAM

## 2023-01-01 PROCEDURE — 02H633Z INSERTION OF INFUSION DEVICE INTO RIGHT ATRIUM, PERCUTANEOUS APPROACH: ICD-10-PCS

## 2023-01-01 PROCEDURE — 3E0G76Z INTRODUCTION OF NUTRITIONAL SUBSTANCE INTO UPPER GI, VIA NATURAL OR ARTIFICIAL OPENING: ICD-10-PCS | Performed by: STUDENT IN AN ORGANIZED HEALTH CARE EDUCATION/TRAINING PROGRAM

## 2023-01-01 PROCEDURE — 77001 FLUOROGUIDE FOR VEIN DEVICE: CPT

## 2023-01-01 PROCEDURE — 85730 THROMBOPLASTIN TIME PARTIAL: CPT

## 2023-01-01 PROCEDURE — 86886 COOMBS TEST INDIRECT TITER: CPT

## 2023-01-01 PROCEDURE — 02HV33Z INSERTION OF INFUSION DEVICE INTO SUPERIOR VENA CAVA, PERCUTANEOUS APPROACH: ICD-10-PCS | Performed by: STUDENT IN AN ORGANIZED HEALTH CARE EDUCATION/TRAINING PROGRAM

## 2023-01-01 PROCEDURE — 93010 ELECTROCARDIOGRAM REPORT: CPT | Performed by: INTERNAL MEDICINE

## 2023-01-01 PROCEDURE — 88304 TISSUE EXAM BY PATHOLOGIST: CPT

## 2023-01-01 PROCEDURE — 86706 HEP B SURFACE ANTIBODY: CPT | Performed by: INTERNAL MEDICINE

## 2023-01-01 PROCEDURE — 85045 AUTOMATED RETICULOCYTE COUNT: CPT | Performed by: INTERNAL MEDICINE

## 2023-01-01 PROCEDURE — 87081 CULTURE SCREEN ONLY: CPT

## 2023-01-01 PROCEDURE — 8010000001 HC DIALYSIS - HEMODIALYSIS PER DAY: Performed by: INTERNAL MEDICINE

## 2023-01-01 PROCEDURE — 8010000001 HC DIALYSIS - HEMODIALYSIS PER DAY

## 2023-01-01 PROCEDURE — 82947 ASSAY GLUCOSE BLOOD QUANT: CPT | Performed by: INTERNAL MEDICINE

## 2023-01-01 PROCEDURE — 93321 DOPPLER ECHO F-UP/LMTD STD: CPT

## 2023-01-01 PROCEDURE — 76937 US GUIDE VASCULAR ACCESS: CPT

## 2023-01-01 PROCEDURE — P9045 ALBUMIN (HUMAN), 5%, 250 ML: HCPCS | Mod: JZ

## 2023-01-01 PROCEDURE — 6350000001 HC RX 635 EPOETIN >10,000 UNITS: Mod: JZ | Performed by: INTERNAL MEDICINE

## 2023-01-01 PROCEDURE — 85055 RETICULATED PLATELET ASSAY: CPT | Performed by: STUDENT IN AN ORGANIZED HEALTH CARE EDUCATION/TRAINING PROGRAM

## 2023-01-01 PROCEDURE — 80048 BASIC METABOLIC PNL TOTAL CA: CPT

## 2023-01-01 PROCEDURE — 2780000003 HC OR 278 NO HCPCS

## 2023-01-01 PROCEDURE — 84439 ASSAY OF FREE THYROXINE: CPT

## 2023-01-01 PROCEDURE — 36558 INSERT TUNNELED CV CATH: CPT

## 2023-01-01 PROCEDURE — 71046 X-RAY EXAM CHEST 2 VIEWS: CPT | Performed by: RADIOLOGY

## 2023-01-01 PROCEDURE — 86022 PLATELET ANTIBODIES: CPT | Performed by: INTERNAL MEDICINE

## 2023-01-01 PROCEDURE — 3E033XZ INTRODUCTION OF VASOPRESSOR INTO PERIPHERAL VEIN, PERCUTANEOUS APPROACH: ICD-10-PCS | Performed by: NURSE PRACTITIONER

## 2023-01-01 PROCEDURE — 83605 ASSAY OF LACTIC ACID: CPT | Performed by: INTERNAL MEDICINE

## 2023-01-01 PROCEDURE — 99285 EMERGENCY DEPT VISIT HI MDM: CPT

## 2023-01-01 PROCEDURE — 5A1D70Z PERFORMANCE OF URINARY FILTRATION, INTERMITTENT, LESS THAN 6 HOURS PER DAY: ICD-10-PCS

## 2023-01-01 PROCEDURE — 82728 ASSAY OF FERRITIN: CPT | Performed by: INTERNAL MEDICINE

## 2023-01-01 PROCEDURE — C1769 GUIDE WIRE: HCPCS

## 2023-01-01 PROCEDURE — 99239 HOSP IP/OBS DSCHRG MGMT >30: CPT | Performed by: INTERNAL MEDICINE

## 2023-01-01 PROCEDURE — 2720000007 HC OR 272 NO HCPCS

## 2023-01-01 PROCEDURE — 87340 HEPATITIS B SURFACE AG IA: CPT | Performed by: NURSE PRACTITIONER

## 2023-01-01 PROCEDURE — 71046 X-RAY EXAM CHEST 2 VIEWS: CPT | Mod: FY

## 2023-01-01 PROCEDURE — 82533 TOTAL CORTISOL: CPT

## 2023-01-01 PROCEDURE — 86077 PHYS BLOOD BANK SERV XMATCH: CPT | Performed by: INTERNAL MEDICINE

## 2023-01-01 PROCEDURE — 82805 BLOOD GASES W/O2 SATURATION: CPT | Performed by: INTERNAL MEDICINE

## 2023-01-01 PROCEDURE — 82607 VITAMIN B-12: CPT

## 2023-01-01 PROCEDURE — 2500000004 HC RX 250 GENERAL PHARMACY W/ HCPCS (ALT 636 FOR OP/ED): Mod: JZ | Performed by: INTERNAL MEDICINE

## 2023-01-01 PROCEDURE — 87075 CULTR BACTERIA EXCEPT BLOOD: CPT | Performed by: INTERNAL MEDICINE

## 2023-01-01 PROCEDURE — 86900 BLOOD TYPING SEROLOGIC ABO: CPT | Performed by: INTERNAL MEDICINE

## 2023-01-01 PROCEDURE — 0JHD3XZ INSERTION OF TUNNELED VASCULAR ACCESS DEVICE INTO RIGHT UPPER ARM SUBCUTANEOUS TISSUE AND FASCIA, PERCUTANEOUS APPROACH: ICD-10-PCS

## 2023-01-01 PROCEDURE — 85384 FIBRINOGEN ACTIVITY: CPT | Performed by: STUDENT IN AN ORGANIZED HEALTH CARE EDUCATION/TRAINING PROGRAM

## 2023-01-01 PROCEDURE — 99285 EMERGENCY DEPT VISIT HI MDM: CPT | Mod: 25

## 2023-01-01 PROCEDURE — 76937 US GUIDE VASCULAR ACCESS: CPT | Performed by: RADIOLOGY

## 2023-01-01 PROCEDURE — 82805 BLOOD GASES W/O2 SATURATION: CPT

## 2023-01-01 RX ORDER — QUETIAPINE FUMARATE 25 MG/1
25 TABLET, FILM COATED ORAL NIGHTLY
Status: DISCONTINUED | OUTPATIENT
Start: 2023-01-01 | End: 2023-01-01 | Stop reason: HOSPADM

## 2023-01-01 RX ORDER — ACETAMINOPHEN 325 MG/1
650 TABLET ORAL EVERY 4 HOURS PRN
Status: DISCONTINUED | OUTPATIENT
Start: 2023-01-01 | End: 2023-01-01 | Stop reason: HOSPADM

## 2023-01-01 RX ORDER — HYDROMORPHONE HYDROCHLORIDE 1 MG/ML
0.4 INJECTION, SOLUTION INTRAMUSCULAR; INTRAVENOUS; SUBCUTANEOUS
Status: DISCONTINUED | OUTPATIENT
Start: 2023-01-01 | End: 2023-01-01

## 2023-01-01 RX ORDER — LEVOTHYROXINE SODIUM ANHYDROUS 200 UG/5ML
87.5 INJECTION, POWDER, LYOPHILIZED, FOR SOLUTION INTRAVENOUS
Status: DISCONTINUED | OUTPATIENT
Start: 2023-01-01 | End: 2023-01-01 | Stop reason: HOSPADM

## 2023-01-01 RX ORDER — DIPHENHYDRAMINE HYDROCHLORIDE 50 MG/ML
25 INJECTION INTRAMUSCULAR; INTRAVENOUS EVERY 8 HOURS PRN
Status: DISCONTINUED | OUTPATIENT
Start: 2023-01-01 | End: 2023-01-01 | Stop reason: HOSPADM

## 2023-01-01 RX ORDER — OLANZAPINE 10 MG/2ML
5 INJECTION, POWDER, FOR SOLUTION INTRAMUSCULAR EVERY 6 HOURS PRN
Status: DISCONTINUED | OUTPATIENT
Start: 2023-01-01 | End: 2023-01-01 | Stop reason: HOSPADM

## 2023-01-01 RX ORDER — OXYMETAZOLINE HCL 0.05 %
2 SPRAY, NON-AEROSOL (ML) NASAL EVERY 12 HOURS PRN
Status: DISCONTINUED | OUTPATIENT
Start: 2023-01-01 | End: 2023-01-01

## 2023-01-01 RX ORDER — ERGOCALCIFEROL (VITAMIN D2) 200 MCG/ML
8000 DROPS ORAL DAILY
Status: DISCONTINUED | OUTPATIENT
Start: 2023-01-01 | End: 2023-01-01 | Stop reason: HOSPADM

## 2023-01-01 RX ORDER — DEXTROSE MONOHYDRATE 100 MG/ML
0.3 INJECTION, SOLUTION INTRAVENOUS ONCE AS NEEDED
Status: COMPLETED | OUTPATIENT
Start: 2023-01-01 | End: 2023-01-01

## 2023-01-01 RX ORDER — LIDOCAINE 560 MG/1
1 PATCH PERCUTANEOUS; TOPICAL; TRANSDERMAL EVERY 24 HOURS
Status: DISCONTINUED | OUTPATIENT
Start: 2023-01-01 | End: 2023-01-01 | Stop reason: HOSPADM

## 2023-01-01 RX ORDER — TALC
3 POWDER (GRAM) TOPICAL NIGHTLY
Status: DISCONTINUED | OUTPATIENT
Start: 2023-01-01 | End: 2023-01-01 | Stop reason: HOSPADM

## 2023-01-01 RX ORDER — DIPHENHYDRAMINE HCL 25 MG
25 CAPSULE ORAL EVERY 8 HOURS PRN
Status: DISCONTINUED | OUTPATIENT
Start: 2023-01-01 | End: 2023-01-01 | Stop reason: HOSPADM

## 2023-01-01 RX ORDER — POTASSIUM CHLORIDE 1.5 G/1.58G
40 POWDER, FOR SOLUTION ORAL DAILY
Status: DISCONTINUED | OUTPATIENT
Start: 2023-01-01 | End: 2023-01-01 | Stop reason: HOSPADM

## 2023-01-01 RX ORDER — MAGNESIUM SULFATE HEPTAHYDRATE 40 MG/ML
INJECTION, SOLUTION INTRAVENOUS
Status: DISPENSED
Start: 2023-01-01 | End: 2023-01-01

## 2023-01-01 RX ORDER — QUETIAPINE FUMARATE 25 MG/1
25 TABLET, FILM COATED ORAL NIGHTLY
Status: DISCONTINUED | OUTPATIENT
Start: 2023-01-01 | End: 2023-01-01

## 2023-01-01 RX ORDER — MIDAZOLAM HYDROCHLORIDE 1 MG/ML
INJECTION INTRAMUSCULAR; INTRAVENOUS AS NEEDED
Status: COMPLETED | OUTPATIENT
Start: 2023-01-01 | End: 2023-01-01

## 2023-01-01 RX ORDER — PANTOPRAZOLE SODIUM 40 MG/10ML
40 INJECTION, POWDER, LYOPHILIZED, FOR SOLUTION INTRAVENOUS 2 TIMES DAILY
Status: DISCONTINUED | OUTPATIENT
Start: 2023-01-01 | End: 2023-01-01 | Stop reason: HOSPADM

## 2023-01-01 RX ORDER — DEXTROSE 50 % IN WATER (D50W) INTRAVENOUS SYRINGE
12.5
Status: DISCONTINUED | OUTPATIENT
Start: 2023-01-01 | End: 2023-01-01 | Stop reason: HOSPADM

## 2023-01-01 RX ORDER — MAGNESIUM SULFATE HEPTAHYDRATE 40 MG/ML
2 INJECTION, SOLUTION INTRAVENOUS ONCE
Status: COMPLETED | OUTPATIENT
Start: 2023-01-01 | End: 2023-01-01

## 2023-01-01 RX ORDER — POTASSIUM CHLORIDE 1.5 G/1.58G
40 POWDER, FOR SOLUTION ORAL DAILY
Status: DISCONTINUED | OUTPATIENT
Start: 2023-01-01 | End: 2023-01-01

## 2023-01-01 RX ORDER — DIPHENHYDRAMINE HYDROCHLORIDE 50 MG/ML
25 INJECTION INTRAMUSCULAR; INTRAVENOUS ONCE
Status: COMPLETED | OUTPATIENT
Start: 2023-01-01 | End: 2023-01-01

## 2023-01-01 RX ORDER — DEXTROSE MONOHYDRATE AND SODIUM CHLORIDE 5; .45 G/100ML; G/100ML
50 INJECTION, SOLUTION INTRAVENOUS CONTINUOUS
Status: DISCONTINUED | OUTPATIENT
Start: 2023-01-01 | End: 2023-01-01

## 2023-01-01 RX ORDER — METHYLPREDNISOLONE SODIUM SUCCINATE 40 MG/ML
40 INJECTION INTRAMUSCULAR; INTRAVENOUS ONCE
Status: COMPLETED | OUTPATIENT
Start: 2023-01-01 | End: 2023-01-01

## 2023-01-01 RX ORDER — HYDROMORPHONE HYDROCHLORIDE 5 MG/5ML
1 SOLUTION ORAL 3 TIMES DAILY
Status: DISCONTINUED | OUTPATIENT
Start: 2023-01-01 | End: 2023-01-01 | Stop reason: HOSPADM

## 2023-01-01 RX ORDER — DIPHENHYDRAMINE HYDROCHLORIDE 50 MG/ML
25 INJECTION INTRAMUSCULAR; INTRAVENOUS EVERY 8 HOURS PRN
Status: DISCONTINUED | OUTPATIENT
Start: 2023-01-01 | End: 2023-01-01

## 2023-01-01 RX ORDER — CALCIUM GLUCONATE 20 MG/ML
2 INJECTION, SOLUTION INTRAVENOUS ONCE
Status: COMPLETED | OUTPATIENT
Start: 2023-01-01 | End: 2023-01-01

## 2023-01-01 RX ORDER — DEXTROSE 50 % IN WATER (D50W) INTRAVENOUS SYRINGE
25
Status: DISCONTINUED | OUTPATIENT
Start: 2023-01-01 | End: 2023-01-01 | Stop reason: HOSPADM

## 2023-01-01 RX ORDER — TALC
3 POWDER (GRAM) TOPICAL NIGHTLY
Status: DISCONTINUED | OUTPATIENT
Start: 2023-01-01 | End: 2023-01-01

## 2023-01-01 RX ORDER — HYDROMORPHONE HYDROCHLORIDE 1 MG/ML
0.2 INJECTION, SOLUTION INTRAMUSCULAR; INTRAVENOUS; SUBCUTANEOUS EVERY 4 HOURS PRN
Status: DISCONTINUED | OUTPATIENT
Start: 2023-01-01 | End: 2023-01-01 | Stop reason: HOSPADM

## 2023-01-01 RX ORDER — ONDANSETRON HYDROCHLORIDE 2 MG/ML
4 INJECTION, SOLUTION INTRAVENOUS EVERY 6 HOURS PRN
Status: DISCONTINUED | OUTPATIENT
Start: 2023-01-01 | End: 2023-01-01

## 2023-01-01 RX ORDER — HYDROMORPHONE HYDROCHLORIDE 1 MG/ML
0.4 INJECTION, SOLUTION INTRAMUSCULAR; INTRAVENOUS; SUBCUTANEOUS EVERY 4 HOURS PRN
Status: DISCONTINUED | OUTPATIENT
Start: 2023-01-01 | End: 2023-01-01

## 2023-01-01 RX ORDER — DEXTROSE MONOHYDRATE AND SODIUM CHLORIDE 5; .45 G/100ML; G/100ML
50 INJECTION, SOLUTION INTRAVENOUS CONTINUOUS
Status: DISCONTINUED | OUTPATIENT
Start: 2023-01-01 | End: 2023-01-01 | Stop reason: HOSPADM

## 2023-01-01 RX ORDER — HYDROMORPHONE HYDROCHLORIDE 1 MG/ML
0.4 INJECTION, SOLUTION INTRAMUSCULAR; INTRAVENOUS; SUBCUTANEOUS EVERY 4 HOURS PRN
Status: DISCONTINUED | OUTPATIENT
Start: 2023-01-01 | End: 2023-01-01 | Stop reason: HOSPADM

## 2023-01-01 RX ORDER — PANTOPRAZOLE SODIUM 40 MG/10ML
INJECTION, POWDER, LYOPHILIZED, FOR SOLUTION INTRAVENOUS
Status: COMPLETED
Start: 2023-01-01 | End: 2023-01-01

## 2023-01-01 RX ORDER — OLANZAPINE 5 MG/1
5 TABLET ORAL EVERY 6 HOURS PRN
Status: DISCONTINUED | OUTPATIENT
Start: 2023-01-01 | End: 2023-01-01 | Stop reason: HOSPADM

## 2023-01-01 RX ORDER — IPRATROPIUM BROMIDE AND ALBUTEROL SULFATE 2.5; .5 MG/3ML; MG/3ML
3 SOLUTION RESPIRATORY (INHALATION) EVERY 4 HOURS PRN
Status: DISCONTINUED | OUTPATIENT
Start: 2023-01-01 | End: 2023-01-01 | Stop reason: HOSPADM

## 2023-01-01 RX ORDER — OLANZAPINE 10 MG/2ML
5 INJECTION, POWDER, FOR SOLUTION INTRAMUSCULAR EVERY 6 HOURS PRN
Status: DISCONTINUED | OUTPATIENT
Start: 2023-01-01 | End: 2023-01-01

## 2023-01-01 RX ORDER — INSULIN LISPRO 100 [IU]/ML
0-5 INJECTION, SOLUTION INTRAVENOUS; SUBCUTANEOUS EVERY 6 HOURS
Status: DISCONTINUED | OUTPATIENT
Start: 2023-01-01 | End: 2023-01-01 | Stop reason: HOSPADM

## 2023-01-01 RX ORDER — DIPHENHYDRAMINE HCL 25 MG
25 CAPSULE ORAL EVERY 8 HOURS PRN
Status: DISCONTINUED | OUTPATIENT
Start: 2023-01-01 | End: 2023-01-01

## 2023-01-01 RX ORDER — ACETAMINOPHEN 500 MG
5 TABLET ORAL NIGHTLY
Status: DISCONTINUED | OUTPATIENT
Start: 2023-01-01 | End: 2023-01-01 | Stop reason: HOSPADM

## 2023-01-01 RX ORDER — ONDANSETRON HYDROCHLORIDE 2 MG/ML
4 INJECTION, SOLUTION INTRAVENOUS EVERY 6 HOURS PRN
Status: DISCONTINUED | OUTPATIENT
Start: 2023-01-01 | End: 2023-01-01 | Stop reason: HOSPADM

## 2023-01-01 RX ADMIN — PIPERACILLIN SODIUM AND TAZOBACTAM SODIUM 2.25 G: 2; .25 INJECTION, SOLUTION INTRAVENOUS at 17:20

## 2023-01-01 RX ADMIN — HYDROMORPHONE HYDROCHLORIDE 1 MG: 1 SOLUTION ORAL at 21:16

## 2023-01-01 RX ADMIN — HYDROMORPHONE HYDROCHLORIDE 0.4 MG: 1 INJECTION, SOLUTION INTRAMUSCULAR; INTRAVENOUS; SUBCUTANEOUS at 03:40

## 2023-01-01 RX ADMIN — HYDROMORPHONE HYDROCHLORIDE 0.4 MG: 1 INJECTION, SOLUTION INTRAMUSCULAR; INTRAVENOUS; SUBCUTANEOUS at 13:59

## 2023-01-01 RX ADMIN — LIDOCAINE 1 PATCH: 4 PATCH TOPICAL at 16:14

## 2023-01-01 RX ADMIN — LIDOCAINE 1 PATCH: 4 PATCH TOPICAL at 14:59

## 2023-01-01 RX ADMIN — OLANZAPINE 5 MG: 10 INJECTION, POWDER, FOR SOLUTION INTRAMUSCULAR at 03:30

## 2023-01-01 RX ADMIN — OLANZAPINE 5 MG: 5 TABLET, FILM COATED ORAL at 22:31

## 2023-01-01 RX ADMIN — PANTOPRAZOLE SODIUM 40 MG: 40 INJECTION, POWDER, FOR SOLUTION INTRAVENOUS at 22:13

## 2023-01-01 RX ADMIN — PANTOPRAZOLE SODIUM 40 MG: 40 INJECTION, POWDER, FOR SOLUTION INTRAVENOUS at 12:52

## 2023-01-01 RX ADMIN — Medication 5 MG: at 20:41

## 2023-01-01 RX ADMIN — HYDROMORPHONE HYDROCHLORIDE 1 MG: 1 SOLUTION ORAL at 09:00

## 2023-01-01 RX ADMIN — QUETIAPINE FUMARATE 25 MG: 25 TABLET ORAL at 20:22

## 2023-01-01 RX ADMIN — PANTOPRAZOLE SODIUM 40 MG: 40 INJECTION, POWDER, FOR SOLUTION INTRAVENOUS at 08:54

## 2023-01-01 RX ADMIN — DAPTOMYCIN 350 MG: 500 INJECTION, POWDER, LYOPHILIZED, FOR SOLUTION INTRAVENOUS at 21:19

## 2023-01-01 RX ADMIN — PIPERACILLIN SODIUM AND TAZOBACTAM SODIUM 2.25 G: 2; .25 INJECTION, SOLUTION INTRAVENOUS at 06:26

## 2023-01-01 RX ADMIN — DEXTROSE MONOHYDRATE 25 G: 25 INJECTION, SOLUTION INTRAVENOUS at 05:43

## 2023-01-01 RX ADMIN — PIPERACILLIN SODIUM AND TAZOBACTAM SODIUM 2.25 G: 2; .25 INJECTION, SOLUTION INTRAVENOUS at 03:42

## 2023-01-01 RX ADMIN — PIPERACILLIN SODIUM AND TAZOBACTAM SODIUM 2.25 G: 2; .25 INJECTION, SOLUTION INTRAVENOUS at 08:47

## 2023-01-01 RX ADMIN — HYDROMORPHONE HYDROCHLORIDE 0.4 MG: 1 INJECTION, SOLUTION INTRAMUSCULAR; INTRAVENOUS; SUBCUTANEOUS at 00:30

## 2023-01-01 RX ADMIN — HYDROMORPHONE HYDROCHLORIDE 1 MG: 1 SOLUTION ORAL at 08:47

## 2023-01-01 RX ADMIN — DAPTOMYCIN 350 MG: 500 INJECTION, POWDER, LYOPHILIZED, FOR SOLUTION INTRAVENOUS at 23:00

## 2023-01-01 RX ADMIN — PIPERACILLIN SODIUM AND TAZOBACTAM SODIUM 2.25 G: 2; .25 INJECTION, SOLUTION INTRAVENOUS at 07:38

## 2023-01-01 RX ADMIN — Medication 3 MG: at 20:23

## 2023-01-01 RX ADMIN — Medication 3 MG: at 21:43

## 2023-01-01 RX ADMIN — EPOETIN ALFA 20000 UNITS: 20000 SOLUTION INTRAVENOUS; SUBCUTANEOUS at 08:47

## 2023-01-01 RX ADMIN — HYDROMORPHONE HYDROCHLORIDE 1 MG: 1 SOLUTION ORAL at 08:53

## 2023-01-01 RX ADMIN — PIPERACILLIN SODIUM AND TAZOBACTAM SODIUM 2.25 G: 2; .25 INJECTION, SOLUTION INTRAVENOUS at 16:16

## 2023-01-01 RX ADMIN — HYDROMORPHONE HYDROCHLORIDE 0.4 MG: 1 INJECTION, SOLUTION INTRAMUSCULAR; INTRAVENOUS; SUBCUTANEOUS at 02:09

## 2023-01-01 RX ADMIN — HYDROMORPHONE HYDROCHLORIDE 1 MG: 1 SOLUTION ORAL at 20:40

## 2023-01-01 RX ADMIN — Medication: at 14:30

## 2023-01-01 RX ADMIN — PANTOPRAZOLE SODIUM 40 MG: 40 INJECTION, POWDER, FOR SOLUTION INTRAVENOUS at 10:19

## 2023-01-01 RX ADMIN — LIDOCAINE 1 PATCH: 4 PATCH TOPICAL at 14:15

## 2023-01-01 RX ADMIN — Medication 5 MG: at 21:14

## 2023-01-01 RX ADMIN — HYDROMORPHONE HYDROCHLORIDE 1 MG: 1 SOLUTION ORAL at 21:13

## 2023-01-01 RX ADMIN — HYDROMORPHONE HYDROCHLORIDE 0.4 MG: 1 INJECTION, SOLUTION INTRAMUSCULAR; INTRAVENOUS; SUBCUTANEOUS at 17:52

## 2023-01-01 RX ADMIN — PIPERACILLIN SODIUM AND TAZOBACTAM SODIUM 2.25 G: 2; .25 INJECTION, SOLUTION INTRAVENOUS at 23:38

## 2023-01-01 RX ADMIN — HYDROMORPHONE HYDROCHLORIDE 0.4 MG: 1 INJECTION, SOLUTION INTRAMUSCULAR; INTRAVENOUS; SUBCUTANEOUS at 14:58

## 2023-01-01 RX ADMIN — MAGNESIUM SULFATE HEPTAHYDRATE 2 G: 40 INJECTION, SOLUTION INTRAVENOUS at 01:29

## 2023-01-01 RX ADMIN — PANTOPRAZOLE SODIUM 40 MG: 40 INJECTION, POWDER, FOR SOLUTION INTRAVENOUS at 09:30

## 2023-01-01 RX ADMIN — PIPERACILLIN SODIUM AND TAZOBACTAM SODIUM 2.25 G: 2; .25 INJECTION, SOLUTION INTRAVENOUS at 15:22

## 2023-01-01 RX ADMIN — PIPERACILLIN SODIUM AND TAZOBACTAM SODIUM 2.25 G: 2; .25 INJECTION, SOLUTION INTRAVENOUS at 06:20

## 2023-01-01 RX ADMIN — PANTOPRAZOLE SODIUM 40 MG: 40 INJECTION, POWDER, FOR SOLUTION INTRAVENOUS at 20:55

## 2023-01-01 RX ADMIN — PANTOPRAZOLE SODIUM 40 MG: 40 INJECTION, POWDER, FOR SOLUTION INTRAVENOUS at 22:31

## 2023-01-01 RX ADMIN — HYDROMORPHONE HYDROCHLORIDE 0.4 MG: 1 INJECTION, SOLUTION INTRAMUSCULAR; INTRAVENOUS; SUBCUTANEOUS at 08:23

## 2023-01-01 RX ADMIN — HYDROMORPHONE HYDROCHLORIDE 0.4 MG: 1 INJECTION, SOLUTION INTRAMUSCULAR; INTRAVENOUS; SUBCUTANEOUS at 15:51

## 2023-01-01 RX ADMIN — PANTOPRAZOLE SODIUM 40 MG: 40 INJECTION, POWDER, FOR SOLUTION INTRAVENOUS at 21:49

## 2023-01-01 RX ADMIN — HYDROMORPHONE HYDROCHLORIDE 1 MG: 1 SOLUTION ORAL at 15:38

## 2023-01-01 RX ADMIN — Medication 8000 UNITS: at 11:52

## 2023-01-01 RX ADMIN — METHYLPREDNISOLONE SODIUM SUCCINATE 40 MG: 40 INJECTION, POWDER, LYOPHILIZED, FOR SOLUTION INTRAMUSCULAR; INTRAVENOUS at 03:00

## 2023-01-01 RX ADMIN — Medication 3 MG: at 20:54

## 2023-01-01 RX ADMIN — HYDROMORPHONE HYDROCHLORIDE 0.4 MG: 1 INJECTION, SOLUTION INTRAMUSCULAR; INTRAVENOUS; SUBCUTANEOUS at 10:45

## 2023-01-01 RX ADMIN — PANTOPRAZOLE SODIUM 40 MG: 40 INJECTION, POWDER, FOR SOLUTION INTRAVENOUS at 08:47

## 2023-01-01 RX ADMIN — DAPTOMYCIN 350 MG: 500 INJECTION, POWDER, LYOPHILIZED, FOR SOLUTION INTRAVENOUS at 22:27

## 2023-01-01 RX ADMIN — PIPERACILLIN SODIUM AND TAZOBACTAM SODIUM 2.25 G: 2; .25 INJECTION, SOLUTION INTRAVENOUS at 23:00

## 2023-01-01 RX ADMIN — PIPERACILLIN SODIUM AND TAZOBACTAM SODIUM 2.25 G: 2; .25 INJECTION, SOLUTION INTRAVENOUS at 17:18

## 2023-01-01 RX ADMIN — DIBASIC SODIUM PHOSPHATE, MONOBASIC POTASSIUM PHOSPHATE AND MONOBASIC SODIUM PHOSPHATE 250 MG: 852; 155; 130 TABLET ORAL at 21:51

## 2023-01-01 RX ADMIN — HYDROMORPHONE HYDROCHLORIDE 0.4 MG: 1 INJECTION, SOLUTION INTRAMUSCULAR; INTRAVENOUS; SUBCUTANEOUS at 21:17

## 2023-01-01 RX ADMIN — OLANZAPINE 5 MG: 10 INJECTION, POWDER, FOR SOLUTION INTRAMUSCULAR at 08:54

## 2023-01-01 RX ADMIN — PANTOPRAZOLE SODIUM 40 MG: 40 INJECTION, POWDER, FOR SOLUTION INTRAVENOUS at 21:20

## 2023-01-01 RX ADMIN — DAPTOMYCIN 350 MG: 500 INJECTION, POWDER, LYOPHILIZED, FOR SOLUTION INTRAVENOUS at 21:52

## 2023-01-01 RX ADMIN — PIPERACILLIN SODIUM AND TAZOBACTAM SODIUM 2.25 G: 2; .25 INJECTION, SOLUTION INTRAVENOUS at 14:34

## 2023-01-01 RX ADMIN — PIPERACILLIN SODIUM AND TAZOBACTAM SODIUM 2.25 G: 2; .25 INJECTION, SOLUTION INTRAVENOUS at 22:31

## 2023-01-01 RX ADMIN — DEXTROSE MONOHYDRATE 12.5 G: 25 INJECTION, SOLUTION INTRAVENOUS at 17:53

## 2023-01-01 RX ADMIN — Medication 8000 UNITS: at 10:45

## 2023-01-01 RX ADMIN — HYDROMORPHONE HYDROCHLORIDE 0.4 MG: 1 INJECTION, SOLUTION INTRAMUSCULAR; INTRAVENOUS; SUBCUTANEOUS at 11:44

## 2023-01-01 RX ADMIN — PIPERACILLIN SODIUM AND TAZOBACTAM SODIUM 2.25 G: 2; .25 INJECTION, SOLUTION INTRAVENOUS at 22:50

## 2023-01-01 RX ADMIN — PIPERACILLIN SODIUM AND TAZOBACTAM SODIUM 2.25 G: 2; .25 INJECTION, SOLUTION INTRAVENOUS at 05:45

## 2023-01-01 RX ADMIN — Medication 8000 UNITS: at 08:48

## 2023-01-01 RX ADMIN — Medication 8000 UNITS: at 08:54

## 2023-01-01 RX ADMIN — PANTOPRAZOLE SODIUM 40 MG: 40 INJECTION, POWDER, LYOPHILIZED, FOR SOLUTION INTRAVENOUS at 07:00

## 2023-01-01 RX ADMIN — HYDROMORPHONE HYDROCHLORIDE 0.4 MG: 1 INJECTION, SOLUTION INTRAMUSCULAR; INTRAVENOUS; SUBCUTANEOUS at 09:51

## 2023-01-01 RX ADMIN — MIDAZOLAM HYDROCHLORIDE 1 MG: 1 INJECTION, SOLUTION INTRAMUSCULAR; INTRAVENOUS at 09:36

## 2023-01-01 RX ADMIN — QUETIAPINE FUMARATE 25 MG: 25 TABLET ORAL at 21:43

## 2023-01-01 RX ADMIN — Medication 8000 UNITS: at 10:20

## 2023-01-01 RX ADMIN — PANTOPRAZOLE SODIUM 40 MG: 40 INJECTION, POWDER, FOR SOLUTION INTRAVENOUS at 10:44

## 2023-01-01 RX ADMIN — HYDROMORPHONE HYDROCHLORIDE 1 MG: 1 SOLUTION ORAL at 22:29

## 2023-01-01 RX ADMIN — Medication 5 MG: at 21:17

## 2023-01-01 RX ADMIN — Medication: at 06:30

## 2023-01-01 RX ADMIN — Medication 8000 UNITS: at 12:53

## 2023-01-01 RX ADMIN — PIPERACILLIN SODIUM AND TAZOBACTAM SODIUM 2.25 G: 2; .25 INJECTION, SOLUTION INTRAVENOUS at 15:38

## 2023-01-01 RX ADMIN — HYDROMORPHONE HYDROCHLORIDE 0.4 MG: 1 INJECTION, SOLUTION INTRAMUSCULAR; INTRAVENOUS; SUBCUTANEOUS at 12:15

## 2023-01-01 RX ADMIN — PIPERACILLIN SODIUM AND TAZOBACTAM SODIUM 2.25 G: 2; .25 INJECTION, SOLUTION INTRAVENOUS at 22:45

## 2023-01-01 RX ADMIN — PANTOPRAZOLE SODIUM 40 MG: 40 INJECTION, POWDER, FOR SOLUTION INTRAVENOUS at 07:00

## 2023-01-01 RX ADMIN — HYDROMORPHONE HYDROCHLORIDE 1 MG: 1 SOLUTION ORAL at 15:30

## 2023-01-01 RX ADMIN — DEXTROSE MONOHYDRATE 0.3 G/KG/HR: 100 INJECTION, SOLUTION INTRAVENOUS at 12:36

## 2023-01-01 RX ADMIN — Medication 800000 UNITS: at 09:30

## 2023-01-01 RX ADMIN — HYDROMORPHONE HYDROCHLORIDE 0.4 MG: 1 INJECTION, SOLUTION INTRAMUSCULAR; INTRAVENOUS; SUBCUTANEOUS at 03:07

## 2023-01-01 RX ADMIN — PIPERACILLIN SODIUM AND TAZOBACTAM SODIUM 2.25 G: 2; .25 INJECTION, SOLUTION INTRAVENOUS at 15:00

## 2023-01-01 RX ADMIN — HYDROMORPHONE HYDROCHLORIDE 1 MG: 1 SOLUTION ORAL at 17:20

## 2023-01-01 RX ADMIN — PIPERACILLIN SODIUM AND TAZOBACTAM SODIUM 2.25 G: 2; .25 INJECTION, SOLUTION INTRAVENOUS at 14:53

## 2023-01-01 RX ADMIN — HYDROMORPHONE HYDROCHLORIDE 0.4 MG: 1 INJECTION, SOLUTION INTRAMUSCULAR; INTRAVENOUS; SUBCUTANEOUS at 20:05

## 2023-01-01 RX ADMIN — HYDROMORPHONE HYDROCHLORIDE 0.4 MG: 1 INJECTION, SOLUTION INTRAMUSCULAR; INTRAVENOUS; SUBCUTANEOUS at 18:10

## 2023-01-01 RX ADMIN — CALCIUM GLUCONATE 2 G: 20 INJECTION, SOLUTION INTRAVENOUS at 02:51

## 2023-01-01 RX ADMIN — Medication 5 MG: at 22:30

## 2023-01-01 RX ADMIN — HYDROMORPHONE HYDROCHLORIDE 0.4 MG: 1 INJECTION, SOLUTION INTRAMUSCULAR; INTRAVENOUS; SUBCUTANEOUS at 00:07

## 2023-01-01 RX ADMIN — PANTOPRAZOLE SODIUM 40 MG: 40 INJECTION, POWDER, FOR SOLUTION INTRAVENOUS at 20:41

## 2023-01-01 RX ADMIN — HYDROMORPHONE HYDROCHLORIDE 1 MG: 1 SOLUTION ORAL at 10:16

## 2023-01-01 RX ADMIN — OXYMETAZOLINE HYDROCHLORIDE 2 SPRAY: 0.05 SPRAY NASAL at 14:57

## 2023-01-01 RX ADMIN — LIDOCAINE 1 PATCH: 4 PATCH TOPICAL at 13:59

## 2023-01-01 RX ADMIN — HYDROMORPHONE HYDROCHLORIDE 0.4 MG: 1 INJECTION, SOLUTION INTRAMUSCULAR; INTRAVENOUS; SUBCUTANEOUS at 23:57

## 2023-01-01 RX ADMIN — Medication 5 MG: at 22:14

## 2023-01-01 RX ADMIN — PANTOPRAZOLE SODIUM 40 MG: 40 INJECTION, POWDER, FOR SOLUTION INTRAVENOUS at 21:18

## 2023-01-01 RX ADMIN — LIDOCAINE 1 PATCH: 4 PATCH TOPICAL at 15:00

## 2023-01-01 RX ADMIN — HYDROMORPHONE HYDROCHLORIDE 1 MG: 1 SOLUTION ORAL at 21:50

## 2023-01-01 RX ADMIN — HYDROMORPHONE HYDROCHLORIDE 1 MG: 1 SOLUTION ORAL at 14:58

## 2023-01-01 RX ADMIN — PIPERACILLIN SODIUM AND TAZOBACTAM SODIUM 2.25 G: 2; .25 INJECTION, SOLUTION INTRAVENOUS at 06:01

## 2023-01-01 RX ADMIN — PIPERACILLIN SODIUM AND TAZOBACTAM SODIUM 2.25 G: 2; .25 INJECTION, SOLUTION INTRAVENOUS at 10:20

## 2023-01-01 RX ADMIN — HYDROMORPHONE HYDROCHLORIDE 0.4 MG: 1 INJECTION, SOLUTION INTRAMUSCULAR; INTRAVENOUS; SUBCUTANEOUS at 06:32

## 2023-01-01 RX ADMIN — DIPHENHYDRAMINE HYDROCHLORIDE 25 MG: 50 INJECTION, SOLUTION INTRAMUSCULAR; INTRAVENOUS at 03:24

## 2023-01-01 RX ADMIN — HYDROMORPHONE HYDROCHLORIDE 1 MG: 1 SOLUTION ORAL at 15:21

## 2023-01-01 RX ADMIN — LIDOCAINE 1 PATCH: 4 PATCH TOPICAL at 15:21

## 2023-01-01 RX ADMIN — PIPERACILLIN SODIUM AND TAZOBACTAM SODIUM 2.25 G: 2; .25 INJECTION, SOLUTION INTRAVENOUS at 02:55

## 2023-01-01 RX ADMIN — PIPERACILLIN SODIUM AND TAZOBACTAM SODIUM 2.25 G: 2; .25 INJECTION, SOLUTION INTRAVENOUS at 22:12

## 2023-01-01 RX ADMIN — PANTOPRAZOLE SODIUM 40 MG: 40 INJECTION, POWDER, FOR SOLUTION INTRAVENOUS at 20:23

## 2023-01-01 ASSESSMENT — COGNITIVE AND FUNCTIONAL STATUS - GENERAL
TURNING FROM BACK TO SIDE WHILE IN FLAT BAD: TOTAL
DAILY ACTIVITIY SCORE: 6
STANDING UP FROM CHAIR USING ARMS: TOTAL
PERSONAL GROOMING: TOTAL
MOVING TO AND FROM BED TO CHAIR: TOTAL
EATING MEALS: TOTAL
TURNING FROM BACK TO SIDE WHILE IN FLAT BAD: TOTAL
EATING MEALS: TOTAL
MOBILITY SCORE: 12
HELP NEEDED FOR BATHING: TOTAL
DRESSING REGULAR UPPER BODY CLOTHING: A LOT
MOBILITY SCORE: 6
PERSONAL GROOMING: TOTAL
CLIMB 3 TO 5 STEPS WITH RAILING: TOTAL
STANDING UP FROM CHAIR USING ARMS: TOTAL
CLIMB 3 TO 5 STEPS WITH RAILING: TOTAL
TOILETING: TOTAL
EATING MEALS: A LOT
HELP NEEDED FOR BATHING: TOTAL
DRESSING REGULAR UPPER BODY CLOTHING: TOTAL
HELP NEEDED FOR BATHING: TOTAL
TURNING FROM BACK TO SIDE WHILE IN FLAT BAD: TOTAL
STANDING UP FROM CHAIR USING ARMS: TOTAL
MOBILITY SCORE: 7
HELP NEEDED FOR BATHING: A LOT
MOVING FROM LYING ON BACK TO SITTING ON SIDE OF FLAT BED WITH BEDRAILS: TOTAL
CLIMB 3 TO 5 STEPS WITH RAILING: TOTAL
PERSONAL GROOMING: TOTAL
STANDING UP FROM CHAIR USING ARMS: TOTAL
STANDING UP FROM CHAIR USING ARMS: TOTAL
MOVING TO AND FROM BED TO CHAIR: TOTAL
WALKING IN HOSPITAL ROOM: TOTAL
DRESSING REGULAR UPPER BODY CLOTHING: A LOT
EATING MEALS: TOTAL
DRESSING REGULAR LOWER BODY CLOTHING: TOTAL
TOILETING: TOTAL
HELP NEEDED FOR BATHING: TOTAL
DRESSING REGULAR UPPER BODY CLOTHING: TOTAL
EATING MEALS: TOTAL
TOILETING: TOTAL
CLIMB 3 TO 5 STEPS WITH RAILING: TOTAL
HELP NEEDED FOR BATHING: TOTAL
STANDING UP FROM CHAIR USING ARMS: TOTAL
PERSONAL GROOMING: A LOT
WALKING IN HOSPITAL ROOM: TOTAL
HELP NEEDED FOR BATHING: TOTAL
STANDING UP FROM CHAIR USING ARMS: TOTAL
CLIMB 3 TO 5 STEPS WITH RAILING: A LOT
EATING MEALS: A LOT
DAILY ACTIVITIY SCORE: 6
EATING MEALS: TOTAL
PERSONAL GROOMING: TOTAL
MOBILITY SCORE: 6
TURNING FROM BACK TO SIDE WHILE IN FLAT BAD: TOTAL
TURNING FROM BACK TO SIDE WHILE IN FLAT BAD: TOTAL
WALKING IN HOSPITAL ROOM: A LOT
TURNING FROM BACK TO SIDE WHILE IN FLAT BAD: TOTAL
TURNING FROM BACK TO SIDE WHILE IN FLAT BAD: A LOT
MOVING TO AND FROM BED TO CHAIR: TOTAL
EATING MEALS: TOTAL
WALKING IN HOSPITAL ROOM: A LOT
MOBILITY SCORE: 6
TURNING FROM BACK TO SIDE WHILE IN FLAT BAD: TOTAL
MOVING FROM LYING ON BACK TO SITTING ON SIDE OF FLAT BED WITH BEDRAILS: TOTAL
MOVING TO AND FROM BED TO CHAIR: TOTAL
MOVING FROM LYING ON BACK TO SITTING ON SIDE OF FLAT BED WITH BEDRAILS: TOTAL
MOVING FROM LYING ON BACK TO SITTING ON SIDE OF FLAT BED WITH BEDRAILS: A LOT
MOVING TO AND FROM BED TO CHAIR: TOTAL
MOVING TO AND FROM BED TO CHAIR: A LOT
WALKING IN HOSPITAL ROOM: TOTAL
CLIMB 3 TO 5 STEPS WITH RAILING: A LOT
CLIMB 3 TO 5 STEPS WITH RAILING: TOTAL
TOILETING: TOTAL
MOVING FROM LYING ON BACK TO SITTING ON SIDE OF FLAT BED WITH BEDRAILS: TOTAL
TOILETING: TOTAL
STANDING UP FROM CHAIR USING ARMS: TOTAL
PERSONAL GROOMING: TOTAL
DRESSING REGULAR LOWER BODY CLOTHING: TOTAL
MOBILITY SCORE: 6
TURNING FROM BACK TO SIDE WHILE IN FLAT BAD: TOTAL
DRESSING REGULAR LOWER BODY CLOTHING: TOTAL
WALKING IN HOSPITAL ROOM: TOTAL
MOBILITY SCORE: 6
EATING MEALS: TOTAL
DRESSING REGULAR UPPER BODY CLOTHING: TOTAL
DRESSING REGULAR LOWER BODY CLOTHING: A LOT
DAILY ACTIVITIY SCORE: 12
DRESSING REGULAR LOWER BODY CLOTHING: TOTAL
STANDING UP FROM CHAIR USING ARMS: TOTAL
MOVING FROM LYING ON BACK TO SITTING ON SIDE OF FLAT BED WITH BEDRAILS: A LOT
DRESSING REGULAR LOWER BODY CLOTHING: TOTAL
DRESSING REGULAR LOWER BODY CLOTHING: TOTAL
MOBILITY SCORE: 6
DRESSING REGULAR LOWER BODY CLOTHING: TOTAL
PERSONAL GROOMING: TOTAL
HELP NEEDED FOR BATHING: A LOT
EATING MEALS: TOTAL
HELP NEEDED FOR BATHING: TOTAL
MOVING FROM LYING ON BACK TO SITTING ON SIDE OF FLAT BED WITH BEDRAILS: A LOT
STANDING UP FROM CHAIR USING ARMS: TOTAL
DRESSING REGULAR LOWER BODY CLOTHING: TOTAL
MOBILITY SCORE: 6
WALKING IN HOSPITAL ROOM: TOTAL
CLIMB 3 TO 5 STEPS WITH RAILING: TOTAL
DAILY ACTIVITIY SCORE: 6
TOILETING: TOTAL
MOVING FROM LYING ON BACK TO SITTING ON SIDE OF FLAT BED WITH BEDRAILS: A LOT
TURNING FROM BACK TO SIDE WHILE IN FLAT BAD: A LOT
MOVING FROM LYING ON BACK TO SITTING ON SIDE OF FLAT BED WITH BEDRAILS: TOTAL
DAILY ACTIVITIY SCORE: 6
TOILETING: TOTAL
STANDING UP FROM CHAIR USING ARMS: A LOT
DRESSING REGULAR UPPER BODY CLOTHING: TOTAL
MOVING FROM LYING ON BACK TO SITTING ON SIDE OF FLAT BED WITH BEDRAILS: TOTAL
TOILETING: TOTAL
MOVING TO AND FROM BED TO CHAIR: TOTAL
PERSONAL GROOMING: TOTAL
TOILETING: A LOT
DAILY ACTIVITIY SCORE: 7
MOVING TO AND FROM BED TO CHAIR: TOTAL
TOILETING: A LOT
WALKING IN HOSPITAL ROOM: TOTAL
DAILY ACTIVITIY SCORE: 6
WALKING IN HOSPITAL ROOM: TOTAL
MOVING FROM LYING ON BACK TO SITTING ON SIDE OF FLAT BED WITH BEDRAILS: TOTAL
PERSONAL GROOMING: A LOT
WALKING IN HOSPITAL ROOM: TOTAL
DRESSING REGULAR UPPER BODY CLOTHING: A LOT
CLIMB 3 TO 5 STEPS WITH RAILING: TOTAL
DRESSING REGULAR UPPER BODY CLOTHING: TOTAL
CLIMB 3 TO 5 STEPS WITH RAILING: TOTAL
DRESSING REGULAR LOWER BODY CLOTHING: A LOT
CLIMB 3 TO 5 STEPS WITH RAILING: TOTAL
MOVING TO AND FROM BED TO CHAIR: TOTAL
MOBILITY SCORE: 6
MOBILITY SCORE: 8
MOBILITY SCORE: 12
DAILY ACTIVITIY SCORE: 6
MOVING TO AND FROM BED TO CHAIR: TOTAL
MOVING FROM LYING ON BACK TO SITTING ON SIDE OF FLAT BED WITH BEDRAILS: TOTAL
CLIMB 3 TO 5 STEPS WITH RAILING: TOTAL
MOVING TO AND FROM BED TO CHAIR: TOTAL
DRESSING REGULAR UPPER BODY CLOTHING: TOTAL
WALKING IN HOSPITAL ROOM: TOTAL
DAILY ACTIVITIY SCORE: 12
HELP NEEDED FOR BATHING: TOTAL
DRESSING REGULAR UPPER BODY CLOTHING: A LOT
TURNING FROM BACK TO SIDE WHILE IN FLAT BAD: A LOT
WALKING IN HOSPITAL ROOM: TOTAL
DAILY ACTIVITIY SCORE: 7
TURNING FROM BACK TO SIDE WHILE IN FLAT BAD: TOTAL
PERSONAL GROOMING: TOTAL
STANDING UP FROM CHAIR USING ARMS: A LOT
MOVING TO AND FROM BED TO CHAIR: A LOT

## 2023-01-01 ASSESSMENT — ENCOUNTER SYMPTOMS
MYALGIAS: 1
DIARRHEA: 0
CONSTIPATION: 0
DIARRHEA: 0
FATIGUE: 1
FATIGUE: 1
BACK PAIN: 1
SHORTNESS OF BREATH: 0
WOUND: 1
BACK PAIN: 1
WOUND: 1
DIARRHEA: 0
HEADACHES: 0
SHORTNESS OF BREATH: 0
ABDOMINAL PAIN: 1
SLEEP DISTURBANCE: 1
NAUSEA: 0
VOMITING: 0
WOUND: 1
CONFUSION: 1
WEAKNESS: 1
SLEEP DISTURBANCE: 1
SHORTNESS OF BREATH: 0
VOMITING: 0
SLEEP DISTURBANCE: 1
HEADACHES: 0
WEAKNESS: 1
BACK PAIN: 1
WEAKNESS: 1
MYALGIAS: 1
NERVOUS/ANXIOUS: 1
VOMITING: 0
NERVOUS/ANXIOUS: 1
CONFUSION: 1
CONSTIPATION: 0
FATIGUE: 1
NERVOUS/ANXIOUS: 1
ABDOMINAL PAIN: 0
HEADACHES: 0
NAUSEA: 0
CONFUSION: 1
CONSTIPATION: 0
ABDOMINAL PAIN: 1
MYALGIAS: 1
NAUSEA: 0

## 2023-01-01 ASSESSMENT — PAIN - FUNCTIONAL ASSESSMENT
PAIN_FUNCTIONAL_ASSESSMENT: 0-10
PAIN_FUNCTIONAL_ASSESSMENT: 0-10
PAIN_FUNCTIONAL_ASSESSMENT: PAINAD (PAIN ASSESSMENT IN ADVANCED DEMENTIA SCALE)
PAIN_FUNCTIONAL_ASSESSMENT: 0-10
PAIN_FUNCTIONAL_ASSESSMENT: 0-10
PAIN_FUNCTIONAL_ASSESSMENT: PAINAD (PAIN ASSESSMENT IN ADVANCED DEMENTIA SCALE)
PAIN_FUNCTIONAL_ASSESSMENT: 0-10
PAIN_FUNCTIONAL_ASSESSMENT: PAINAD (PAIN ASSESSMENT IN ADVANCED DEMENTIA SCALE)
PAIN_FUNCTIONAL_ASSESSMENT: PAINAD (PAIN ASSESSMENT IN ADVANCED DEMENTIA SCALE)
PAIN_FUNCTIONAL_ASSESSMENT: 0-10
PAIN_FUNCTIONAL_ASSESSMENT: UNABLE TO SELF-REPORT
PAIN_FUNCTIONAL_ASSESSMENT: 0-10
PAIN_FUNCTIONAL_ASSESSMENT: 0-10

## 2023-01-01 ASSESSMENT — PAIN SCALES - PAIN ASSESSMENT IN ADVANCED DEMENTIA (PAINAD)
FACIALEXPRESSION: SMILING OR INEXPRESSIVE
NEGVOCALIZATION: OCCASIONAL MOAN/GROAN, LOW SPEECH, NEGATIVE/DISAPPROVING QUALITY
BREATHING: NORMAL
TOTALSCORE: 1
TOTALSCORE: 7
TOTALSCORE: 6
CONSOLABILITY: DISTRACTED OR REASSURED BY VOICE/TOUCH
CONSOLABILITY: DISTRACTED OR REASSURED BY VOICE/TOUCH
FACIALEXPRESSION: SMILING OR INEXPRESSIVE
FACIALEXPRESSION: SMILING OR INEXPRESSIVE
CONSOLABILITY: NO NEED TO CONSOLE
FACIALEXPRESSION: FACIAL GRIMACING
BODYLANGUAGE: TENSE, DISTRESSED PACING, FIDGETING
BODYLANGUAGE: RELAXED
BREATHING: NORMAL
BODYLANGUAGE: TENSE, DISTRESSED PACING, FIDGETING
FACIALEXPRESSION: SMILING OR INEXPRESSIVE
CONSOLABILITY: DISTRACTED OR REASSURED BY VOICE/TOUCH
BODYLANGUAGE: TENSE, DISTRESSED PACING, FIDGETING
CONSOLABILITY: DISTRACTED OR REASSURED BY VOICE/TOUCH
BREATHING: NORMAL
BREATHING: OCCASIONAL LABORED BREATHING, SHORT PERIOD OF HYPERVENTILATION
BREATHING: NORMAL
BREATHING: NORMAL
CONSOLABILITY: DISTRACTED OR REASSURED BY VOICE/TOUCH
TOTALSCORE: REPOSITIONED;OTHER (COMMENT)
CONSOLABILITY: UNABLE TO CONSOLE, DISTRACT OR REASSURE
NEGVOCALIZATION: OCCASIONAL MOAN/GROAN, LOW SPEECH, NEGATIVE/DISAPPROVING QUALITY
FACIALEXPRESSION: FACIAL GRIMACING
TOTALSCORE: 0
CONSOLABILITY: DISTRACTED OR REASSURED BY VOICE/TOUCH
NEGVOCALIZATION: OCCASIONAL MOAN/GROAN, LOW SPEECH, NEGATIVE/DISAPPROVING QUALITY
BODYLANGUAGE: RELAXED
TOTALSCORE: 7
BREATHING: NORMAL
FACIALEXPRESSION: FACIAL GRIMACING
BODYLANGUAGE: TENSE, DISTRESSED PACING, FIDGETING
BREATHING: OCCASIONAL LABORED BREATHING, SHORT PERIOD OF HYPERVENTILATION
NEGVOCALIZATION: OCCASIONAL MOAN/GROAN, LOW SPEECH, NEGATIVE/DISAPPROVING QUALITY
BREATHING: NORMAL
TOTALSCORE: 6
BREATHING: NORMAL
BODYLANGUAGE: RELAXED
NEGVOCALIZATION: OCCASIONAL MOAN/GROAN, LOW SPEECH, NEGATIVE/DISAPPROVING QUALITY
TOTALSCORE: 4
TOTALSCORE: MEDICATION (SEE MAR);REPOSITIONED
NEGVOCALIZATION: REPEATED TROUBLED CALLING OUT, LOUD MOANING/GROANING, CRYING
NEGVOCALIZATION: REPEATED TROUBLED CALLING OUT, LOUD MOANING/GROANING, CRYING
FACIALEXPRESSION: FACIAL GRIMACING
FACIALEXPRESSION: FACIAL GRIMACING
BODYLANGUAGE: TENSE, DISTRESSED PACING, FIDGETING
BREATHING: OCCASIONAL LABORED BREATHING, SHORT PERIOD OF HYPERVENTILATION
NEGVOCALIZATION: OCCASIONAL MOAN/GROAN, LOW SPEECH, NEGATIVE/DISAPPROVING QUALITY
CONSOLABILITY: UNABLE TO CONSOLE, DISTRACT OR REASSURE
TOTALSCORE: 2
CONSOLABILITY: NO NEED TO CONSOLE

## 2023-01-01 ASSESSMENT — PAIN SCALES - GENERAL
PAINLEVEL_OUTOF10: 6
PAINLEVEL_OUTOF10: 0 - NO PAIN
PAINLEVEL_OUTOF10: 0 - NO PAIN
PAINLEVEL_OUTOF10: 9
PAINLEVEL_OUTOF10: 10 - WORST POSSIBLE PAIN
PAINLEVEL_OUTOF10: 0 - NO PAIN
PAINLEVEL_OUTOF10: 9
PAINLEVEL_OUTOF10: 6
PAINLEVEL_OUTOF10: 10 - WORST POSSIBLE PAIN
PAINLEVEL_OUTOF10: 0 - NO PAIN
PAINLEVEL_OUTOF10: 9
PAINLEVEL_OUTOF10: 0 - NO PAIN
PAINLEVEL_OUTOF10: 0 - NO PAIN
PAINLEVEL_OUTOF10: 7
PAINLEVEL_OUTOF10: 9
PAINLEVEL_OUTOF10: 10 - WORST POSSIBLE PAIN
PAINLEVEL_OUTOF10: 9
PAINLEVEL_OUTOF10: 10 - WORST POSSIBLE PAIN
PAINLEVEL_OUTOF10: 8
PAINLEVEL_OUTOF10: 8
PAINLEVEL_OUTOF10: 0 - NO PAIN
PAINLEVEL_OUTOF10: 7
PAINLEVEL_OUTOF10: 5 - MODERATE PAIN
PAINLEVEL_OUTOF10: 0 - NO PAIN
PAINLEVEL_OUTOF10: 10 - WORST POSSIBLE PAIN
PAINLEVEL_OUTOF10: 0 - NO PAIN
PAINLEVEL_OUTOF10: 7
PAINLEVEL_OUTOF10: 10 - WORST POSSIBLE PAIN
PAINLEVEL_OUTOF10: 8
PAINLEVEL_OUTOF10: 10 - WORST POSSIBLE PAIN
PAINLEVEL_OUTOF10: 8
PAINLEVEL_OUTOF10: 10 - WORST POSSIBLE PAIN
PAINLEVEL_OUTOF10: 8
PAINLEVEL_OUTOF10: 7
PAINLEVEL_OUTOF10: 0 - NO PAIN

## 2023-01-01 ASSESSMENT — PAIN SCALES - WONG BAKER
WONGBAKER_NUMERICALRESPONSE: HURTS LITTLE MORE
WONGBAKER_NUMERICALRESPONSE: HURTS WORST
WONGBAKER_NUMERICALRESPONSE: NO HURT
WONGBAKER_NUMERICALRESPONSE: HURTS WORST
WONGBAKER_NUMERICALRESPONSE: HURTS WHOLE LOT

## 2023-09-22 NOTE — Clinical Note
R. internal jugular tunnneled HD cath insertion complete.  PT received versed 1mf and fentanyl 50 mcg IVP, sedation time 15 minutes

## 2023-09-30 NOTE — H&P
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 She  then presented to [facility] initially 9/2/23 in acute on chronic hypercapnic  respiratory failure/septic shock requiring intubation and admission to ICU level care. She had only received half sessions of dialysis in the intervening 4 days between admissions, so she needed urgent  dialysis on presentation. Of note, once stabilized and transferred to SDU,  patient left AMA, made it to the parking lot and became significantly dyspneic and returned back to the ED . She was given some fluid and was admitted back to SDU for further evaluation  and care. 9/22 HD unit for hemorrhage from RUE at the conclusion of HD session. Defects in skin overlying the mid-humeral region of the  AVG, apparently away from access sites during the HD session. Exposed graft material  was noted at that time.  2-0 purstring sutures x 2 surrounding skin/soft defects placed. 9/22 being transferred to [facility] further evaluation from vascular surgery for bleeding in graft while  receiving dialysis. Patient has no complaints on evaluation in ED. She is resting comfortably in bed.   PMH: HTN, HLD, ESRD (MWF RUE AVG), hypothyroid, HFpEF (~70% LVEF), LUISITO, COPD (6L NC), aortic regurg, aortic stenosis, hx DAH, bacteremia, multiple AMA discharges,  noncompliant with  care, numerous admissions for missed dialysis sessions    PSH: RUE brach-ax graft placed by Dr. Gan 8-10 years ago    Soc hx: 3-4 cigars daily; no drug or alcohol use    Fam hx: reviewed and noncontributory    ROS: 12 point ROS was performed and is negative except as stated above.    Meds:  -trazodone 50 mg oral tablet   -torsemide 10mg oral tablet  -melatonin 3 mg oral tablet   - lidocaine topically with dialysis  -levothyroxine 175 mcg  -ipatropropium albuterol 0.5mg  -hydralazine 50 mg oral tablet   -gabapentin 100 mg oral capsule   -epoetin deisi  -docusate sodium 100 mg oral   -collagenase 250 unites/topical ointment   -clonidine 0.3 mg oral tablet   -eliquis 5 mg oral tablet  -amlodipine 10 mg oral tablet     Comorbidities:   Comorbidites:  ·  Comorbid Conditions chronic kidney disease, chronic obstructive pulmonary disease   ·  Chronic Kidney Disease hypertension  requiring dialysis   ·  CKD Stage End of stage renal disease   ·  COPD unknown            Allergies:  ·  vancomycin : Resp Distress, Itching  ·  carvedilol : Unknown  ·  doxazosin : Unknown  ·  benazepril : Unknown    Medications Prior to Admission:   Admission Medication Reconciliation has not been completed for this patient.    Objective:     Objective Information:      T   P  R  BP   MAP  SpO2   Value  36.3  91  16  132/62      100%  Date/Time 9/22 17:15 9/22 17:15 9/22 17:15 9/22 17:15    9/22 17:15  Range  (36.3C - 36.3C )  (91 - 91 )  (16 - 16 )  (132 - 132 )/ (62 - 62 )    (100% - 100% )   As of 22-Sep-2023 17:15:00, patient is on 4 L/min of oxygen via nasal cannula.      Pain reported at 9/22 17:15: 7 = Severe    Physical Exam by System:    Constitutional: NAD, AAOx3, cacechtic   Eyes: EOMI, PERRLA, anicteric   ENMT: moist mucous membranes   Head/Neck: NCAT, trachea midline   Respiratory/Thorax: normal effort, CTAB   Cardiovascular: RRR; RUE with dressing covering AV  graft, when this is removed there is a series of  prolene sutures holding skin approximated with what is clearly graft material sandwiched between them, no active bleeding; there is a palpable brachial, radial, and ulnar pulse distal to this; the graft  is not pulsatile nor does it have a thrill; no gross infection at this time   Gastrointestinal: soft, NTND   Extremities: MAEx4, FROM, WWP   Neurological: no gross deficits   Psychological: Appropriate mood and behavior   Skin: no obvious lesions     Recent Lab Results:    Results:    CBC: 9/22/2023 17:30              \     Hgb     /                              \     7.4 L    /  WBC  ----------------  Plt               7.5       ----------------    134 L            /     Hct     \                              /     24.9 L    \            RBC: 2.61 L    MCV: 95           BMP: 9/22/2023 17:30  NA+        Cl-     BUN  /                         138    102    8  /  --------------------------------  Glucose                ---------------------------  37 LL    K+     HCO3-   Creat \                         4.6  26    1.47 H \  Calcium : 8.5 L    Anion Gap : 15      RFP: 9/22/2023 12:40  NA+        Cl-     BUN  /                         Canceled    Canceled    Canceled  /  --------------------------------  Glucose                ---------------------------  Canceled    K+     HCO3-   Creat \                         Canceled    Canceled    Canceled  \  Calcium : CanceledAnion Gap : Canceled          Albumin : Canceled     Phos : Canceled The performance characteristics of phosphorus testing in   heparinized plasma have been validated by the individual     laboratory site where testing is performed. Testing    on heparinized plasma is not approved by the FDA;    however, suc        ---------- Recent Arterial Blood Gas Results----------     9/18/2023 14:33  pO2 77  pH 7.30  pCO2 59  SO2 96  Base Excess 2.1null    Assessment and Plan:   Assessment:    53 F with complex medical history, on ESRD, transferred  "urgently to [facility] for bleeding AV graft with exposed graft material. Currently  stable in ER with adequate hemostasis achieved with prior sutures.     - Plan for graft explant first thing in the morning: consented and added to OR schedule  - admit to SDU level of care  - zosyn/ vanco  - CLD tonight, NPO pMN  - gentle hydration with NS  - Will hold antihypertensives for now, may need to restart in coming days  - hold home eliquis (last dose 9/21 PM)    Deacon Kaba MD  Vascular Surgery Fellow  Service Pager: [phone]      Attestation:   Note Completion:        Electronic Signatures:  Deacon Kaba (Fellow))  (Signed 22-Sep-2023 19:49)   Authored: History of Present Illness, Objective, Assessment  and Plan, Note Completion  Mago Medellin (Resident))  (Signed 22-Sep-2023 19:25)   Authored: History of Present Illness, Comorbidities,  Allergies, Medications Prior to Admission, Objective, Assessment and Plan  Kamaljit Wu)  (Signed 23-Sep-2023 01:27)   Authored: Note Completion   Co-Signer: History of Present Illness, Objective, Assessment and Plan, Note Completion      Last Updated: 23-Sep-2023 01:27 by Kamaljit Wu)    References:  1.  Data Referenced From \"Triage - ED\" 22-Sep-2023 17:04   "

## 2023-09-30 NOTE — PROGRESS NOTES
Service: Nephrology     Subjective Data:   VASQUEZ PAK is a 52 year old Female who is Hospital Day # 8.     Patient refers complaints of pruritus  Dialysis tomorrow patient currently seen in cyproheptadine and hydroxyzine.    Objective Data:   Physical Exam by System:    Constitutional: Alert oriented  Asthenic habitus   Eyes: Sunken eyes pupils react to light and accommodation   ENMT: mucous membranes moist, no apparent injury,  no lesions seen   Head/Neck: Full range of motion no thyromegaly   Respiratory/Thorax: Bibasilar inspiratory crackles  and scattered expiratory wheezing anteriorly   Cardiovascular: 1/6 sternal border systolic murmur   Gastrointestinal: Active peristalsis no rebound or  guarding.  She had an NG tube from the left nostril   Genitourinary: No CVA tenderness   Musculoskeletal: Decreased muscle tone   Extremities: Edema 1+   Neurological: No tremors no asterixis   Breast: No masses, tenderness, no discharge or discoloration   Lymphatic: No significant lymphadenopathy   Psychological: Appropriate mood and behavior   Skin: Poor turgor     Recent Lab Results:    Results:        I have reviewed these laboratory results:    Hepatitis B Surface Antigen  02-Aug-2023 11:43:00      Result Value    Hep.B Surface Ag  NONREACTIVE  Reference Range: NONREACTIVE Biotin interference may cause falsely decreased results. Patients taking a Biotin dose of up to 5 mg/day should refrain  from taking Biotin for 24 hours before sample collection. Providers may contact t        Assessment and Plan:   Code Status:  ·  Code Status Full Code       Impression 1: End-stage renal disease   Plan for Impression 1: Dialysis tomorrow   Impression 2: Severe protein calorie malnutrition   Plan for Impression 2: Continue protein supplementation   Impression 3: Sarcopenia   Impression 4: Anemia renal failure and blood loss   Plan for Impression 4: Continue to adjust DIAZ's and  Venofer       Electronic  Signatures:  Daren Bocanegra)  (Signed 03-Aug-2023 11:53)   Authored: Service, Subjective Data, Objective Data, Assessment  and Plan, Note Completion      Last Updated: 03-Aug-2023 11:53 by Daren Bocanegra)

## 2023-09-30 NOTE — PROGRESS NOTES
Subjective Data:   ID Statement:  VASQUEZ PAK is a 53 year old Female who is Hospital Day # 5 and ICU Day #1.     Patient is lethargic and oriented x 1. Able to state her name, but falls asleep before answering other questions.    Objective Data:     ·  Objective Information      T   P  R  BP   MAP  SpO2   Value  36.5  88  12  109/54   70  98%  Date/Time 9/26 12:00 9/26 13:00 9/26 13:00 9/26 12:00 9/26 12:00 9/26 13:00  Range  (36.1C - 36.5C )  (84 - 103 )  (6 - 35 )  (109 - 111 )/ (19 - 54 )  (43 - 70 )  (72% - 100% )   As of 26-Sep-2023 08:00:00, patient is on 6 L/min of oxygen via nasal cannula.      Pain reported at 9/26 8:00: 0  Pain reported at 9/26 4:00: 0      Direct Arterial Blood Pressure  Systolic 143 (95 - 160) 9/26 13:00  Diastolic (mm Hg) 52 (24 - 78) 9/26 13:00  Mean (mm Hg) 85 (43 - 213) 9/26 13:00  Pulse Pressure (mm Hg) 91 (17 - 117) 9/26 13:00      ---- Intake and Output  -----  Mn/Dy/Year Time  Intake   Output  Net  Sep 26, 2023 6:00 am  1170.2   0  1170  Sep 25, 2023 10:00 pm  1190   0  1190  Sep 25, 2023 2:00 pm  340   0  340    The Intake and Output Totals for the last 24 hours are:      Intake   Output  Net      2700   null  null    Physical Exam Narrative:  ·  Physical Exam:    Constitutional: Cachectic. Lethargic. A&Ox1. Appears stated age. In NAD.   HEENT: NC/AT, EOMI, clear sclera, moist mucous membranes. Pressure ulceration of right earlobe.  CV: RRR, Grade 3 systolic murmur  PULM: CTAB, wheezing bilaterally, on 8L NC  ABDOMEN: Soft, NT/ND. No TTP. + BSx4.  SKIN: Normal Color, Warm, Dry, No Rashes, sacral pressure ulcer  EXTREMITIES: Non-Tender, Full ROM, No Pedal Edema, RUE edematous but not firm, weak RUE pulse, RUE graft site wrapped in ace bandage  NEURO: A&O x 1, nonfocal neurological exam.  PSYCH: Normal Mood & Behavior     Allergies:       Allergies:  ·  vancomycin : Resp Distress, Itching  ·  carvedilol : Unknown  ·  doxazosin : Unknown  ·  benazepril :  Unknown    Recent Lab Results:    Results:    CBC: 9/26/2023 06:55              \     Hgb     /                              \     5.4 LL    /  WBC  ----------------  Plt               15.5 H    ----------------    71 L            /     Hct     \                              /     15.5 L    \            RBC: 1.85 L    MCV: 84     Neutrophil %: 86.8      CMP: 9/26/2023 06:55  NA+        Cl-     BUN  /                         130 L   94 L    38 H /  --------------------------------  Glucose                ---------------------------  71 L    K+     HCO3-   Creat \                         4.5    23    3.57 H \           \  T Bili  /                    \  1.5 H /  AST  x ---- x ALT        7 Lx ---- x 4 L         /  Alk P   \               /  150 H \  Calcium : 8.9     Anion Gap : 18     Albumin : 3.0 L    T Protein : 4.8 L           RFP: 9/25/2023 18:09  NA+        Cl-     BUN  /                         130 L   94 L    35 H /  --------------------------------  Glucose                ---------------------------  252 H    K+     HCO3-   Creat \                         4.6    25    3.72 H \  Calcium : 8.6Anion Gap : 16          Albumin : 2.6 L    Phos : 3.2      Coagulation: 9/26/2023 09:37  PT  /                    30.1 H /  -------<    INR          ----------<      2.6 H  PTT\                    55 H \                       ---------- Recent Arterial Blood Gas Results----------     9/26/2023 06:11  pO2 59  24 h range: ( 59 - 212 )  pH 7.35  24 h range: ( 7.35 - 7.37 )  pCO2 44  24 h range: ( 38 - 44 )  SO2 91  24 h range: ( 91 - 99 )  Base Excess -1.2  24 h range: ( -3.6 - -0.3 )null    Assessment and Plan:   Daily Risk Screen:  ·  Does patient have a central line? yes   ·  Central Line Type dialysis / plasmapheresis   ·  Plan for dialysis / plasmapheresis catheter removal today? no   ·  The patient continues to require dialysis / plasmapheresis catheter access for dialysis / hemapheresis   ·  Does  patient have an indwelling urinary catheter? no   ·  Is the patient intubated? no     Assessment/Plan:  ·  Assessment/Plan:    TUCKERKUSUMMATHEWSVASQUEZ MORA is a 53 year old Female with PMH ESRD on HD MWF via RUE AVF, COPD on home O2 6L, HFmrEF 45-50%, pulmonary hemosiderosis, and HTN presented  from [facility] on 9/22/2023 for bleeding RUE AVG. Pt had HD at OSH on 9/22 and had complete session. Towards end of HD session, RUE AVG was bleeding excessively. Graft was ligated on 9/22 and pt transferred to [facility] to have graft removed. On 9/23, pt  was scheduled for OR graft explant but case cancelled as pt was hypoglycemic, hypotensive, and altered so transferred to CTICU on 9/23.    Neuro  #Acute Encephalopathy, unknown etiology.  D/DX: infection, renal failure.  Chronic Pain  - OARSS reviewed, Last script for T#3; OD Risk score 160.   - A&Ox1-2, somnolent, ?expressive aphasia  - 9/18: CT Head: no acute hemorrhage or stroke  - previously on Gabapentin which was stopped at [facility] due to encephalopathy   - PRN Tylenol for pain  - Stop narcotics, sedating meds    Cardiac  #HFpEF  #Tricuspid regurg  #R sided heart failure  #HTN  #HLD  #Hypotension-- sepsis vs hemorrhagic shock - resolved  - TTE 9/6 EF 45-50%, low normal RV function, mod LVH, LA severely dilated, RVSP 66mmHg  - Repeat Echo 9/25: LVEF 75-80%, RV volume and pressure overload, reduced RV systolic function, RV mildly enlarged, RVSP 143.7, Mod AV regurg, Mod MV regurg, Severe TV regurg, significantly restricted TV septal leaflet mobility with pseudo prolapse of  the anterior leaflet  - continue Midodrine 10 mg TID  - Albumin x4 doses  - A.M. cortisol 15.1  - Cardiology: repeat echo once patient euvolemic    Pulm  #Chronic hypoxic respiratory failure  #COPD on 6 L home O2  #Pulmonary Hemosiderosis & Hx of Diffuse Alveolar hemorrhage 2/2 inhalation of cleaning fluid in 2017  - requiring 6-8L NC  - PRN DuoNebs QID  - IS while awake    GI  #Severe Protein Calorie  Malnutrition  #Grade D Esophagitis  - renal diet when able to eat  - 9/25 EGD: Moderately severe LA Grade D esophagitis with circumferential ulceration and exudate not actively bleeding was found. Coffee grounds in esophagus.  Scattered moderate inflammation and scattered hematin was found in the stomach. No signs of  active bleeding observed. No bleeding lesions. The examined duodenum showed erosive peptic duodenitis in setting of melanosis, mainly deep erosions but no vessel or pigmented spots.  - BID PPI    Renal  #ESRD on HD (MWF)  - Nonfunctioning RUE AVG  - Last HD 9/22 at [facility]  - plan for RUE AVG extraction in OR after dialysis.  - RFP daily  - Vit D 15  - received temp HD cath  - nephro: plan for dialysis    Heme  #Anemia of Chronic disease  #Acute blood loss anemia  #Thrombocytopenia  #recent hx of L axillary and brachial DVT  - received apixiban in [facility], holding now  - Several antibodies present, [place] assisting with appropriate cross match.  - previous admit, Anti PF4 Negative on 9/5  - Transfuse 2 units today  - Transfuse uncrossed RBC if becomes unstable with Hgb drop   - hold AC in setting of potential acute bleeding, current INR 2.6  - giving 2mg Vit K  - SCD's for DVT prophylaxis    ID  #Sepsis, improved  2/2#Sacral pressure ulcer vs #Exposed LUE AV graft  - Recently treated for Actinobacter (+BC 9/2, pan sens) bacteremia   - Completed 14 day course of zosyn 9/3-9/5 and cefepime 9/5-9/16.  - 9/23 BC x1 NGTD  - 9/25 BC X2 obtained  - checking Procalc 4.77 -> 4.23  - allergic to Vanc  - ID: continue Linezolid (9/23- current), Zosyn (9/23-- current), consider changing linezolid if remains thrombocytopenic  - HIV, Hep = negative  - Bacterial and Fungal cx for intra-op specimens ordered    Endo  #Hypoglycemia  2/2 from infection/possible sepsis  - Q2H blood sugars  - Levothyroxine 175  microgram Oral  Daily  - insert dobhoff for feeding    Wounds  #Stage 3-4 sacral pressure ulcer (size: 7cm x 9.5cm  x 1.5cm)  - seen by wound care RN  - Aggressive turning q2 hours side-to-side to offload sacrum. Cleanse and redress sacral wound DAILY using nickel-thick layer of Santyl (collagenase) ointment, then piece of Aquacel Ag cut to fit; secure with Mepilex foam. Optimize nutrition.  - ACS to debride sacral wound in OR after dialysis    #LUE AV Graft exposure  - Vas Surgery to explant LUE AV graft on 9/26  - Santyl to wound bed with dressing changes daily    Access: Temp HD line Rt IJ (9/24), L Brachial A-line (9/23), LUE PIV, L foot PIV    Fluids: IV bolus as needed, ESRD  Electrolytes: replete as needed  Nutrition: enteral feeding via dobhoff  DVT PPx: holding due to concern for bleed  GI PPx: Protonix BID  Abx: Linezolid (9/23- current), Zosyn (9/23-- current),  Access: Temp HD line Rt IJ (9/24), L Brachial A-line (9/23), LUE PIV, L foot PIV    Dispo: Pt is a 54yo F w/ pmhx of ESRD on dialysis MWF, COPD on 6L baseline, HFrEF, being treated for encephalopathy, septic vs hemorrhagic shock, and hypoglycemia. Plan for dialysis and then OR for sacral wound and exposed AV graft. [facility] surgery, cardiology,  ID, and palliative consulted.    Norman Gregorio DO  Internal Medicine PGY1    Code Status:  ·  Code Status Full Code     Attestation:   Note Completion:  I am a:  Resident/Fellow   Attending Attestation I saw and evaluated the patient.  I personally obtained the key and critical portions of the history and physical exam or was physically present for key and  critical portions performed by the resident/fellow. I reviewed the resident/fellow?s documentation and discussed the patient with the resident/fellow.  I agree with the resident/fellow?s medical decision making as documented in the note.     I personally evaluated the patient on 26-Sep-2023   Critical Care Patient I have reviewed and evaluated the most recent data and results, personally examined the patient, and formulated the plan of care as presented above.  This  patient  was critically ill and required continued critical care treatment. Teaching and any separately billable procedures are not included in the time calculation.    Billing Provider Critical Care Time 35 minute(s)   Primary Critical Care Issue/Treatment (See Assessment and Plan for greater detail) -- This patient has impending or acute respiratory failure. We are treating with appropriate medications, ventilatory and/or oxygenating support, as indicated,  as well as intensive monitoring. Please see assessment and plan above for greater detail.; -- This patient is believed to have significant acute or end-stage renal failure requiring careful monitoring of fluid and respiratory status, including intensive  treatment with appropriate medications or dialysis, as indicated. Please see assessment and plan above for greater detail.; -- This patient is believed to have significant heart failure requiring careful monitoring of fluid and respiratory status, including  intensive treatment with cardiovascular medications or devices, as indicated. Please see assessment and plan above for greater detail.         Electronic Signatures:  Yang Quick)  (Signed 29-Sep-2023 23:34)   Authored: Note Completion   Co-Signer: Assessment and Plan, Note Completion  Norman Gregorio (DO (Resident))  (Signed 26-Sep-2023 14:24)   Authored: Subjective Data, Objective Data, Assessment  and Plan, Note Completion      Last Updated: 29-Sep-2023 23:34 by Yang Quick)

## 2023-09-30 NOTE — PROGRESS NOTES
VASQUEZ PAK is a 53 year old Female who is Hospital Day # 8 and POD #0 for sacral decubitus ulcer debridement.       Subjective   Patient was seen and examined. She seems more awake today. She wanted to go outside. She had an episode of nose bleed x 2 two but resolved after pressures      Objective     Physical Exam  Constitutional: Cachectic. A&Ox2  HEENT: dry oral mucosa. Dobhoff in place   CV: RRR, Grade 3 systolic murmur  PULM: CTAB, wheezing bilaterally, on 2L NC  ABDOMEN: Soft, NT/ND. No TTP. + BSx4.  SKIN: Normal Color, Warm, Dry, No Rashes, sacral pressure ulcer  EXTREMITIES: Non-Tender, Full ROM, No Pedal Edema, RUE with bandage  NEURO: A&O x 1-2, nonfocal neurological exam.      Last Recorded Vitals  Blood pressure 151/60, pulse 106, temperature 36.9 °C (98.4 °F), resp. rate 16, height 1.524 m (5'), weight (!) 44.6 kg (98 lb 5.2 oz), SpO2 98 %.  Intake/Output last 3 Shifts:  No intake/output data recorded.    Relevant Results  Scheduled medications  [START ON 10/1/2023] daptomycin, 350 mg, intravenous, q48h  ergocalciferol, 8,000 Units, oral, Daily  insulin lispro, 0-5 Units, subcutaneous, q6h  lidocaine, 1 patch, transdermal, q24h  melatonin, 3 mg, oral, Nightly  pantoprazole, 40 mg, intravenous, BID  piperacillin-tazobactam, 2.25 g, intravenous, q8h  QUEtiapine, 25 mg, oral, Nightly      Continuous medications     PRN medications  PRN medications: dextrose, dextrose, dextrose, glucagon, HYDROmorphone, ondansetron, oxymetazoline               Results for orders placed or performed during the hospital encounter of 09/22/23 (from the past 24 hour(s))   POCT GLUCOSE   Result Value Ref Range    POCT Glucose 65 (L) 74 - 99 mg/dL   BLOOD GAS VENOUS FULL PANEL   Result Value Ref Range    POCT pH, Venous 7.39 7.33 - 7.43 pH    POCT pCO2, Venous 53 (H) 41 - 51 mm Hg    POCT pO2, Venous 49 (H) 35 - 45 mm Hg    POCT SO2, Venous 85 (H) 45 - 75 %    POCT Oxy Hemoglobin, Venous 83.1 (H) 45.0 - 75.0 %     POCT Hematocrit Calculated, Venous 19.0 (L) 36.0 - 46.0 %    POCT Sodium, Venous 138 136 - 145 mmol/L    POCT Potassium, Venous 3.5 3.5 - 5.3 mmol/L    POCT Chloride, Venous 104 98 - 107 mmol/L    POCT Ionized Calicum, Venous 1.17 1.10 - 1.33 mmol/L    POCT Glucose, Venous 68 (L) 74 - 99 mg/dL    POCT Lactate, Venous 0.9 0.4 - 2.0 mmol/L    POCT Base Excess, Venous 6.5 (H) -2.0 - 3.0 mmol/L    POCT HCO3 Calculated, Venous 32.1 (H) 22.0 - 26.0 mmol/L    POCT Hemoglobin, Venous 6.4 (LL) 12.0 - 16.0 g/dL    POCT Anion Gap, Venous 5.0 (L) 10.0 - 25.0 mmol/L    Patient Temperature     POCT GLUCOSE   Result Value Ref Range    POCT Glucose 78 74 - 99 mg/dL   POCT GLUCOSE   Result Value Ref Range    POCT Glucose 104 (H) 74 - 99 mg/dL   CBC and Auto Differential   Result Value Ref Range    WBC 14.7 (H) 4.4 - 11.3 x10*3/uL    nRBC 0.0 0.0 - 0.0 /100 WBCs    RBC 1.95 (L) 4.00 - 5.20 x10*6/uL    Hemoglobin 5.9 (LL) 12.0 - 16.0 g/dL    Hematocrit 17.3 (L) 36.0 - 46.0 %    MCV 89 80 - 100 fL    MCH 30.3 26.0 - 34.0 pg    MCHC 34.1 32.0 - 36.0 g/dL    RDW 16.0 (H) 11.5 - 14.5 %    Platelets 40 (LL) 150 - 450 x10*3/uL    MPV 12.2 (H) 7.5 - 11.5 fL    Immature Granulocytes %, Automated 0.6 0.0 - 0.9 %    Immature Granulocytes Absolute, Automated 0.09 0.00 - 0.70 x10*3/uL   Renal function panel   Result Value Ref Range    Glucose 96 74 - 99 mg/dL    Sodium 142 136 - 145 mmol/L    Potassium 3.8 3.5 - 5.3 mmol/L    Chloride 101 98 - 107 mmol/L    Bicarbonate 32 21 - 32 mmol/L    Anion Gap 13 10 - 20 mmol/L    Urea Nitrogen 13 6 - 23 mg/dL    Creatinine 1.48 (H) 0.50 - 1.05 mg/dL    eGFR 42 (L) >60 mL/min/1.73m*2    Calcium 8.7 8.6 - 10.6 mg/dL    Phosphorus 1.6 (L) 2.5 - 4.9 mg/dL    Albumin 2.8 (L) 3.4 - 5.0 g/dL   Coagulation Screen   Result Value Ref Range    Protime 15.2 (H) 9.8 - 12.8 seconds    INR 1.3 (H) 0.9 - 1.1    aPTT 30 27 - 38 seconds   Manual Differential   Result Value Ref Range    Neutrophils %, Manual 83.9 40.0 - 80.0  %    Lymphocytes %, Manual 2.7 13.0 - 44.0 %    Monocytes %, Manual 1.8 2.0 - 10.0 %    Eosinophils %, Manual 8.9 0.0 - 6.0 %    Basophils %, Manual 2.7 0.0 - 2.0 %    Seg Neutrophils Absolute, Manual 12.33 (H) 1.20 - 7.00 x10*3/uL    Lymphocytes Absolute, Manual 0.40 (L) 1.20 - 4.80 x10*3/uL    Monocytes Absolute, Manual 0.26 0.10 - 1.00 x10*3/uL    Eosinophils Absolute, Manual 1.31 (H) 0.00 - 0.70 x10*3/uL    Basophils Absolute, Manual 0.40 (H) 0.00 - 0.10 x10*3/uL    Total Cells Counted 112     RBC Morphology No significant RBC morphology present    Magnesium   Result Value Ref Range    Magnesium 1.94 1.60 - 2.40 mg/dL   POCT GLUCOSE   Result Value Ref Range    POCT Glucose 105 (H) 74 - 99 mg/dL                   Assessment/Plan   Active Problems:  There are no active Hospital Problems.  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 Pt had HD at OSH on 9/22 and had complete session. Towards end of HD session, RUE AVG was bleeding excessively. Graft was ligated on 9/22 and pt transferred to [facility] to have graft removed. On 9/23, pt was scheduled for OR graft explant but case cancelled as pt was hypoglycemic, hypotensive, and altered so transferred to CTICU on 9/23    PLAN:  Acute anemia most likely Post op losses       1. Transfuse one unit PRBC     2. Acute delirium in the setting of prolonged hospitalization, narcotics     1. Much improved today      3. Right upper AVG bleeding s/p explant and sacral decubitus ulcer debridment      1. tolerated procedure       2. Vascular is requesting to continue antibiotics for 1-2 weeks       3. will follow up OR cultures       4. Patient is on dilaudid for pain control     3.. HFpEF/Tricuspid regurg/R sided heart failure      1. Appreciate cards recommendation-will follow up outpatient     4.  Thrombocytopenia-multifactorial       1. stable        2. continue to monitor     5. HTN/HLD/Hypotension-- sepsis vs hemorrhagic shock        1. resolved     6. Chronic hypoxic respiratory failure/COPD on 6 L home O2      1. continue duonebs every 4 hours as need it for wheezing     7. Severe Protein Calorie Malnutrition/Grade D Esophagitis      1. 9/25 EGD: Moderately severe LA Grade D esophagitis with circumferential ulceration and exudate not actively bleeding was found. Coffee grounds in esophagus.  Scattered moderate inflammation and scattered hematin was found in the stomach. No signs of active bleeding observed. No bleeding lesions. The examined duodenum showed erosive peptic duodenitis in setting of melanosis, mainly deep erosions but no vessel or pigmented spots.    8. ESRD on HD (MWF)      1. Renal following     9.  Hypoglycemia       1. resolved       Disposition: continue to monitor. wean her off oxygen.  Patient will go to SNF when clear by the surgical services. anticipated dc > 2 days     DOC GEORGETTE DALY spent 25 minutes in the professional and overall care of this patient.      Georgette Ibrahim DO

## 2023-09-30 NOTE — PROGRESS NOTES
"    Subjective Data:   ID Statement:  VASQUEZ PAK is a 53 year old Female who is Hospital Day # 4 and ICU Day #1.    Objective Data:     ·  Objective Information      T   P  R  BP   MAP  SpO2   Value  36.3  91  14  157/44   74  100%  Date/Time 9/25 12:00 9/25 15:00 9/25 15:00 9/24 5:00  9/24 5:00 9/25 15:00  Range  (36.1C - 36.5C )  (90 - 109 )  (11 - 27 )  (128 - 173 )/ (44 - 128 )  (71 - 141 )  (72% - 100% )   As of 25-Sep-2023 12:00:00, patient is on 3 L/min of oxygen via nasal cannula.      Pain reported at 9/25 12:00: 4  Pain reported at 9/24 18:00: unable to assess;  pt \"feels better\"      Direct Arterial Blood Pressure  Systolic 130 (77 - 160) 9/25 15:00  Diastolic (mm Hg) 38 (24 - 74) 9/25 15:00  Mean (mm Hg) 64 (43 - 82) 9/25 15:00  Pulse Pressure (mm Hg) 92 (13 - 116) 9/25 15:00      ---- Intake and Output  -----  Mn/Dy/Year Time  Intake   Output  Net  Sep 25, 2023 2:00 pm  340   0  340  Sep 25, 2023 6:00 am  370   0  370  Sep 24, 2023 10:00 pm  430   0  430    The Intake and Output Totals for the last 24 hours are:      Intake   Output  Net      1040   null  null    Date:            Weight/Scale Type:  22-Sep-2023 21:00  44.6  kg / bed    Allergies:       Allergies:  ·  vancomycin : Resp Distress, Itching  ·  carvedilol : Unknown  ·  doxazosin : Unknown  ·  benazepril : Unknown    Recent Lab Results:    Results:    CBC: 9/25/2023 11:54              \     Hgb     /                              \     6.5 LL    /  WBC  ----------------  Plt               18.1 H    ----------------    87 L            /     Hct     \                              /     19.0 L    \            RBC: 2.18 L    MCV: 87           RFP: 9/25/2023 11:56  NA+        Cl-     BUN  /                         133 L   95 L    33 H /  --------------------------------  Glucose                ---------------------------  74    K+     HCO3-   Creat \                         4.4    27    3.63 H \  Calcium : 8.9Anion Gap : 15 "          Albumin : 2.8 L    Phos : 3.2          ---------- Recent Arterial Blood Gas Results----------     9/25/2023 18:07  pO2 167  24 h range: ( 67 - 212 )  pH 7.37  24 h range: ( 7.36 - 7.37 )  pCO2 43  24 h range: ( 38 - 46 )  SO2 99  24 h range: ( 97 - 99 )  Base Excess -0.3  24 h range: ( -3.6 - 1.2 )null    Assessment and Plan:   Daily Risk Screen:  ·  Does patient have a central line? no   ·  Does patient have an indwelling urinary catheter? no   ·  Is the patient intubated? no     Assessment/Plan:  ·  Assessment/Plan:    I have discussed the case with the resident/advanced practice provider. I have personally performed a history, physical exam, and my own medical decision making.  I have reviewed the note and agree with the findings and plan with the following exceptions as identified below  I have personally provided 39 minutes of critical care time exclusive of time spent on separately billable procedures. Time includes review of laboratory data, radiology results, discussion with consultants, and monitoring for potential decompensation.  Interventions were performed as documented above.      Neuro: Encephalopathy  Cardiovascular: Severe Pulm HTN, CHF, CAD  Respiratory: Acute resp failure  GI: Upper GIB  Renal/Electrolyte:   ESRD, On HD  ENDO: No acute issues  Heme: Anemia  ID: No acute issues      Family/Surrogate updated with plan of care  Code status addressed/up to date      Code Status:  ·  Code Status Full Code     Attestation:   Note Completion:  Critical Care Patient I have reviewed and evaluated the most recent data and results, personally examined the patient, and formulated the plan of care as presented above.  This patient  was critically ill and required continued critical care treatment. Teaching and any separately billable procedures are not included in the time calculation.   Billing Provider Critical Care Time 39 minute(s)   Primary Critical Care Issue/Treatment (See Assessment and Plan  for greater detail) -- For the nature of the critical condition and treatment, this documentation has been prepared by the attending physician/FAVIAN- billing provider of these critical  care services.         Electronic Signatures:  Nbuia Harris)  (Signed 25-Sep-2023 18:21)   Authored: Subjective Data, Objective Data, Assessment  and Plan, Note Completion      Last Updated: 25-Sep-2023 18:21 by Nubia Harris)

## 2023-09-30 NOTE — PROGRESS NOTES
Service: Nephrology     Subjective Data:   VASQUEZ PAK is a 52 year old Female who is Hospital Day # 5.     Awaiting dialysis.  Her main complaint today is severe pruritus.    Objective Data:   Physical Exam by System:    Constitutional: Alert oriented  Asthenic habitus   Eyes: Sunken eyes pupils react to light and accommodation   ENMT: mucous membranes moist, no apparent injury,  no lesions seen   Head/Neck: Full range of motion no thyromegaly   Respiratory/Thorax: Bibasilar inspiratory crackles  and scattered expiratory wheezing anteriorly   Cardiovascular: 1/6 sternal border systolic murmur   Gastrointestinal: Active peristalsis no rebound or  guarding.  She had an NG tube from the left nostril   Genitourinary: No CVA tenderness   Musculoskeletal: Decreased muscle tone   Extremities: Edema 1+   Neurological: No tremors no asterixis   Breast: No masses, tenderness, no discharge or discoloration   Lymphatic: No significant lymphadenopathy   Psychological: Appropriate mood and behavior   Skin: Poor turgor     Assessment and Plan:        Additional Dx:   Abnormal electrocardiogram: Onset Date: 16-Feb-2023, Entered  Date: 16-Feb-2023 08:07   Shortness of breath at rest: Onset Date: 10-Mar-2020, Entered  Date: 10-Mar-2020 09:46   Atypical chest pain: Entered Date: 28-Jan-2020 14:41   Tachycardia: Entered Date: 16-Jan-2020 07:44   Acute respiratory failure with hypoxia: Entered Date: 15-Laz-2020  06:46   Hyperkalemia: Entered Date: 15-Lza-2020 06:46   Fluid overload: Entered Date: 15-Laz-2020 06:46   Respiratory failure: Entered Date: 13-Nov-2019 03:17   Diastolic heart failure secondary to hypertension: Entered  Date: 27-Oct-2019 03:29   Hypertensive crisis: Entered Date: 27-Oct-2019 03:28   Pre-operative exam: Entered Date: 12-Sep-2019 13:28   Pre-operative exam: Entered Date: 12-Sep-2019 13:28   Hypertensive heart disease without heart failure: Entered Date:  23-Nov-2018 10:26   End-stage renal  disease: Entered Date: 23-Nov-2018 10:26   Hypertensive heart disease with heart failure: Entered Date:  23-Nov-2018 10:26   Acute on chronic diastolic congestive heart failure: Entered  Date: 23-Nov-2018 10:26   Idiopathic pulmonary hemosiderosis: Entered Date: 21-Nov-2018  13:11   Troponin level elevated: Entered Date: 19-Nov-2018 12:15   PNA (pneumonia): Entered Date: 17-Nov-2018 18:21   Dependence on renal dialysis: Entered Date: 16-Jun-2018 11:33   Non-ST elevation myocardial infarction (NSTEMI), type 2: Entered  Date: 15-Mayank-2018 15:41   Abnormal EKG: Onset Date: 06-Mar-2018, Entered Date: 06-Mar-2018  11:34   Infection due to Streptococcus pneumoniae: Onset Date: 16-Feb-2018,  Entered Date: 16-Feb-2018 18:40   Hemoptysis: Onset Date: 16-Feb-2018, Entered Date: 16-Feb-2018  18:40   Abnormal chest CT: Onset Date: 16-Feb-2018, Entered Date: 16-Feb-2018  18:39   Acute on chronic respiratory failure: Onset Date: 16-Feb-2018,  Entered Date: 16-Feb-2018 18:39   Chronic respiratory failure: Onset Date: 16-Feb-2018, Entered  Date: 16-Feb-2018 00:02   ESRD (end stage renal disease) on dialysis: Entered Date: 09-Feb-2018  22:25   History of hemoptysis: Entered Date: 08-Feb-2018 17:46   Hypertensive heart disease without heart failure: Entered Date:  31-Jan-2018 21:11   End-stage renal disease: Entered Date: 31-Jan-2018 21:11   Diarrhea: Entered Date: 29-Jan-2018 11:00   Sepsis: Entered Date: 29-Jan-2018 11:00   HCAP (healthcare-associated pneumonia): Entered Date: 29-Jan-2018  10:59   Diffuse pulmonary alveolar hemorrhage: Entered Date: 04-Dec-2017  07:07   Hypertensive heart disease without heart failure: Entered Date:  26-Jul-2017 19:35   Encounter for preadmission testing: Onset Date: 09-Feb-2017,  Entered Date: 13-Feb-2017 08:52   Encounter for other preprocedural examination: Entered Date:  13-Dec-2016 15:54       Medical History:   CHF, acute on chronic: Entered Date: 15-Nov-2019 08:58   ESRD (end stage renal  disease) on dialysis: Entered Date: 15-Nov-2019  08:58   Volume overload: Entered Date: 15-Nov-2019 08:58   Current nonadherence to medical treatment: Entered Date: 13-Nov-2019  06:09   CHF (congestive heart failure): Onset Date: 30-Jan-2018, Entered  Date: 30-Jan-2018 15:45   Anemia: Entered Date: 30-Nov-2017 20:07   Pulmonary hemorrhage: Entered Date: 30-Nov-2017 19:49   Hypothyroidism: Entered Date: 26-Jul-2017 01:23   COPD (chronic obstructive pulmonary disease): Entered Date:  26-Jul-2017 01:22   Hypertension: Entered Date: 26-Jul-2017 01:22   ESRD (end stage renal disease) on dialysis: Entered Date: 26-Jul-2017  01:22       Surg History:   Tachycardia: Onset Date: 14-Nov-2019, Entered Date: 14-Nov-2019  09:57   Shortness of breath at rest: Onset Date: 28-Oct-2019, Entered  Date: 28-Oct-2019 10:55   Chest pain: Onset Date: 29-Nov-2017, Entered Date: 29-Nov-2017  10:18   Presence of surgically created primary arteriovenous shunt for hemodialysis : Entered Date: 26-Jul-2017 01:22    Code Status:  ·  Code Status Full Code       Impression 1: ESRD   Plan for Impression 1: Dialysis today   Impression 2: Uremic pruritus   Plan for Impression 2: Hydroxyzine/emollients       Electronic Signatures:  Daren Bocanegra)  (Signed 31-Jul-2023 15:12)   Authored: Service, Subjective Data, Objective Data, Assessment  and Plan, Note Completion      Last Updated: 31-Jul-2023 15:12 by Daren Bocanegra)

## 2023-09-30 NOTE — PROGRESS NOTES
Service: Nephrology     Subjective Data:   VASQUEZ PAK is a 52 year old Female who is Hospital Day # 18.     Refers that she had his dark spot in the index finger in the  hand this admission to the prior hospital.  Refers appetite is improving.    Objective Data:   Physical Exam by System:    Constitutional: Alert oriented  Asthenic habitus   Eyes: Sunken eyes pupils react to light and accommodation   ENMT: mucous membranes moist, no apparent injury,  no lesions seen   Head/Neck: Full range of motion no thyromegaly   Respiratory/Thorax: Bibasilar inspiratory crackles  and scattered expiratory wheezing anteriorly   Cardiovascular: 1/6 sternal border systolic murmur   Gastrointestinal: Active peristalsis no rebound or  guarding.  She had an NG tube from the left nostril   Genitourinary: No CVA tenderness   Musculoskeletal: Decreased muscle tone   Extremities: There is a dark spot in the tip of the  index fingers suggestive of old embolic episode   Neurological: No tremors no asterixis   Breast: No masses, tenderness, no discharge or discoloration   Lymphatic: No significant lymphadenopathy   Psychological: Appropriate mood and behavior   Skin: Poor turgor     Assessment and Plan:        Additional Dx:   Abnormal electrocardiogram: Onset Date: 16-Feb-2023, Entered  Date: 16-Feb-2023 08:07   Shortness of breath at rest: Onset Date: 10-Mar-2020, Entered  Date: 10-Mar-2020 09:46   Atypical chest pain: Entered Date: 28-Jan-2020 14:41   Tachycardia: Entered Date: 16-Jan-2020 07:44   Acute respiratory failure with hypoxia: Entered Date: 15-Laz-2020  06:46   Hyperkalemia: Entered Date: 15-Laz-2020 06:46   Fluid overload: Entered Date: 15-Laz-2020 06:46   Respiratory failure: Entered Date: 13-Nov-2019 03:17   Diastolic heart failure secondary to hypertension: Entered  Date: 27-Oct-2019 03:29   Hypertensive crisis: Entered Date: 27-Oct-2019 03:28   Pre-operative exam: Entered Date: 12-Sep-2019  13:28   Pre-operative exam: Entered Date: 12-Sep-2019 13:28   Hypertensive heart disease without heart failure: Entered Date:  23-Nov-2018 10:26   End-stage renal disease: Entered Date: 23-Nov-2018 10:26   Hypertensive heart disease with heart failure: Entered Date:  23-Nov-2018 10:26   Acute on chronic diastolic congestive heart failure: Entered  Date: 23-Nov-2018 10:26   Idiopathic pulmonary hemosiderosis: Entered Date: 21-Nov-2018  13:11   Troponin level elevated: Entered Date: 19-Nov-2018 12:15   PNA (pneumonia): Entered Date: 17-Nov-2018 18:21   Dependence on renal dialysis: Entered Date: 16-Jun-2018 11:33   Non-ST elevation myocardial infarction (NSTEMI), type 2: Entered  Date: 15-Mayank-2018 15:41   Abnormal EKG: Onset Date: 06-Mar-2018, Entered Date: 06-Mar-2018  11:34   Infection due to Streptococcus pneumoniae: Onset Date: 16-Feb-2018,  Entered Date: 16-Feb-2018 18:40   Hemoptysis: Onset Date: 16-Feb-2018, Entered Date: 16-Feb-2018  18:40   Abnormal chest CT: Onset Date: 16-Feb-2018, Entered Date: 16-Feb-2018  18:39   Acute on chronic respiratory failure: Onset Date: 16-Feb-2018,  Entered Date: 16-Feb-2018 18:39   Chronic respiratory failure: Onset Date: 16-Feb-2018, Entered  Date: 16-Feb-2018 00:02   ESRD (end stage renal disease) on dialysis: Entered Date: 09-Feb-2018  22:25   History of hemoptysis: Entered Date: 08-Feb-2018 17:46   Hypertensive heart disease without heart failure: Entered Date:  31-Jan-2018 21:11   End-stage renal disease: Entered Date: 31-Jan-2018 21:11   Diarrhea: Entered Date: 29-Jan-2018 11:00   Sepsis: Entered Date: 29-Jan-2018 11:00   HCAP (healthcare-associated pneumonia): Entered Date: 29-Jan-2018  10:59   Diffuse pulmonary alveolar hemorrhage: Entered Date: 04-Dec-2017  07:07   Hypertensive heart disease without heart failure: Entered Date:  26-Jul-2017 19:35   Encounter for preadmission testing: Onset Date: 09-Feb-2017,  Entered Date: 13-Feb-2017 08:52   Encounter for other  preprocedural examination: Entered Date:  13-Dec-2016 15:54       Medical History:   CHF, acute on chronic: Entered Date: 15-Nov-2019 08:58   ESRD (end stage renal disease) on dialysis: Entered Date: 15-Nov-2019  08:58   Volume overload: Entered Date: 15-Nov-2019 08:58   Current nonadherence to medical treatment: Entered Date: 13-Nov-2019  06:09   CHF (congestive heart failure): Onset Date: 30-Jan-2018, Entered  Date: 30-Jan-2018 15:45   Anemia: Entered Date: 30-Nov-2017 20:07   Pulmonary hemorrhage: Entered Date: 30-Nov-2017 19:49   Hypothyroidism: Entered Date: 26-Jul-2017 01:23   COPD (chronic obstructive pulmonary disease): Entered Date:  26-Jul-2017 01:22   Hypertension: Entered Date: 26-Jul-2017 01:22   ESRD (end stage renal disease) on dialysis: Entered Date: 26-Jul-2017  01:22       Surg History:   Tachycardia: Onset Date: 14-Nov-2019, Entered Date: 14-Nov-2019  09:57   Shortness of breath at rest: Onset Date: 28-Oct-2019, Entered  Date: 28-Oct-2019 10:55   Chest pain: Onset Date: 29-Nov-2017, Entered Date: 29-Nov-2017  10:18   Presence of surgically created primary arteriovenous shunt for hemodialysis : Entered Date: 26-Jul-2017 01:22    Code Status:  ·  Code Status Full Code       Impression 1: End-stage renal disease   Plan for Impression 1: Patient prefers to have cessation  up to 2 1/2 hours only   Impression 2: Congestive heart failure   Plan for Impression 2: We will remove 1 to 2 L tomorrow  with dialysis       Electronic Signatures:  Daren Bocanegra)  (Signed 13-Aug-2023 14:57)   Authored: Service, Subjective Data, Objective Data, Assessment  and Plan, Note Completion      Last Updated: 13-Aug-2023 14:57 by Daren Bocanegra)

## 2023-09-30 NOTE — PROGRESS NOTES
Service: Nephrology     Subjective Data:   VASQUEZ PAK is a 52 year old Female who is Hospital Day # 21.     Patient had dialysis.  She states her appetite has improved.    Objective Data:   Physical Exam by System:    Constitutional: Alert oriented  Asthenic habitus   Eyes: pupils react to light and accommodation   ENMT: mucous membranes moist, no apparent injury,  no lesions seen   Head/Neck: Full range of motion no thyromegaly   Respiratory/Thorax: Bibasilar inspiratory crackles   Cardiovascular: 1/6 sternal border systolic murmur  no gallop no thrills or rubs   Gastrointestinal: Active peristalsis no rebound or  guarding.  She had an NG tube from the left nostril   Genitourinary: No CVA tenderness   Musculoskeletal: Decreased muscle tone   Extremities: There is a dark spot in the tip of the  index fingers suggestive of old embolic episode   Neurological: No tremors no asterixis   Breast: No masses, tenderness, no discharge or discoloration   Lymphatic: No significant lymphadenopathy   Psychological: Appropriate mood and behavior   Skin: Poor turgor     Radiology Results:    Results:        Impression:    Mild interstitial prominence in the lungs, related to pulmonary  edema/congestive heartfailure slightly improved from previous study.        Xray Chest 1 View [Aug 14 2023  8:59AM]      Assessment and Plan:        Additional Dx:   Abnormal electrocardiogram: Onset Date: 16-Feb-2023, Entered  Date: 16-Feb-2023 08:07   Shortness of breath at rest: Onset Date: 10-Mar-2020, Entered  Date: 10-Mar-2020 09:46   Atypical chest pain: Entered Date: 28-Jan-2020 14:41   Tachycardia: Entered Date: 16-Jan-2020 07:44   Acute respiratory failure with hypoxia: Entered Date: 15-Laz-2020  06:46   Hyperkalemia: Entered Date: 15-Laz-2020 06:46   Fluid overload: Entered Date: 15-Laz-2020 06:46   Respiratory failure: Entered Date: 13-Nov-2019 03:17   Diastolic heart failure secondary to hypertension: Entered   Date: 27-Oct-2019 03:29   Hypertensive crisis: Entered Date: 27-Oct-2019 03:28   Pre-operative exam: Entered Date: 12-Sep-2019 13:28   Pre-operative exam: Entered Date: 12-Sep-2019 13:28   Hypertensive heart disease without heart failure: Entered Date:  23-Nov-2018 10:26   End-stage renal disease: Entered Date: 23-Nov-2018 10:26   Hypertensive heart disease with heart failure: Entered Date:  23-Nov-2018 10:26   Acute on chronic diastolic congestive heart failure: Entered  Date: 23-Nov-2018 10:26   Idiopathic pulmonary hemosiderosis: Entered Date: 21-Nov-2018  13:11   Troponin level elevated: Entered Date: 19-Nov-2018 12:15   PNA (pneumonia): Entered Date: 17-Nov-2018 18:21   Dependence on renal dialysis: Entered Date: 16-Jun-2018 11:33   Non-ST elevation myocardial infarction (NSTEMI), type 2: Entered  Date: 15-Mayank-2018 15:41   Abnormal EKG: Onset Date: 06-Mar-2018, Entered Date: 06-Mar-2018  11:34   Infection due to Streptococcus pneumoniae: Onset Date: 16-Feb-2018,  Entered Date: 16-Feb-2018 18:40   Hemoptysis: Onset Date: 16-Feb-2018, Entered Date: 16-Feb-2018  18:40   Abnormal chest CT: Onset Date: 16-Feb-2018, Entered Date: 16-Feb-2018  18:39   Acute on chronic respiratory failure: Onset Date: 16-Feb-2018,  Entered Date: 16-Feb-2018 18:39   Chronic respiratory failure: Onset Date: 16-Feb-2018, Entered  Date: 16-Feb-2018 00:02   ESRD (end stage renal disease) on dialysis: Entered Date: 09-Feb-2018  22:25   History of hemoptysis: Entered Date: 08-Feb-2018 17:46   Hypertensive heart disease without heart failure: Entered Date:  31-Jan-2018 21:11   End-stage renal disease: Entered Date: 31-Jan-2018 21:11   Diarrhea: Entered Date: 29-Jan-2018 11:00   Sepsis: Entered Date: 29-Jan-2018 11:00   HCAP (healthcare-associated pneumonia): Entered Date: 29-Jan-2018  10:59   Diffuse pulmonary alveolar hemorrhage: Entered Date: 04-Dec-2017  07:07   Hypertensive heart disease without heart failure: Entered Date:  26-Jul-2017  19:35   Encounter for preadmission testing: Onset Date: 09-Feb-2017,  Entered Date: 13-Feb-2017 08:52   Encounter for other preprocedural examination: Entered Date:  13-Dec-2016 15:54       Medical History:   CHF, acute on chronic: Entered Date: 15-Nov-2019 08:58   ESRD (end stage renal disease) on dialysis: Entered Date: 15-Nov-2019  08:58   Volume overload: Entered Date: 15-Nov-2019 08:58   Current nonadherence to medical treatment: Entered Date: 13-Nov-2019  06:09   CHF (congestive heart failure): Onset Date: 30-Jan-2018, Entered  Date: 30-Jan-2018 15:45   Anemia: Entered Date: 30-Nov-2017 20:07   Pulmonary hemorrhage: Entered Date: 30-Nov-2017 19:49   Hypothyroidism: Entered Date: 26-Jul-2017 01:23   COPD (chronic obstructive pulmonary disease): Entered Date:  26-Jul-2017 01:22   Hypertension: Entered Date: 26-Jul-2017 01:22   ESRD (end stage renal disease) on dialysis: Entered Date: 26-Jul-2017  01:22       Surg History:   Tachycardia: Onset Date: 14-Nov-2019, Entered Date: 14-Nov-2019  09:57   Shortness of breath at rest: Onset Date: 28-Oct-2019, Entered  Date: 28-Oct-2019 10:55   Chest pain: Onset Date: 29-Nov-2017, Entered Date: 29-Nov-2017  10:18   Presence of surgically created primary arteriovenous shunt for hemodialysis : Entered Date: 26-Jul-2017 01:22    Code Status:  ·  Code Status Full Code       Impression 1: End-stage renal disease   Plan for Impression 1: Continue current dialysis  prescription   Impression 2: Anorexia   Plan for Impression 2: Continue cyproheptadine and  encourage protein intake   Impression 3: Protein calorie malnutrition   Impression 4: Anemia renal disease   Plan for Impression 4: Monitor hemoglobin continue  DIAZ's and iron   Impression 5: Congestive heart failure   Plan for Impression 5: We will try to remove fluid  with current dialysis prescription       Electronic Signatures:  Daren Bocanegra)  (Signed 16-Aug-2023 14:14)   Authored: Service, Subjective Data,  Objective Data, Assessment  and Plan, Note Completion      Last Updated: 16-Aug-2023 14:14 by Daren Bocanegra)

## 2023-09-30 NOTE — PROGRESS NOTES
Service: Infectious Disease     Subjective Data:   VASQUEZ PAK is a 53 year old Female who is Hospital Day # 6.    Additional Information:    Patient had temporary dialysis line placed yesterday. Seen this morning, endorsed pain but was unable to localize. Intermittently responding to questions.     Objective Data:     Objective Information:      T   P  R  BP   MAP  SpO2   Value  36.8  98  24  157/54   70  100%  Date/Time 9/27 8:00 9/27 11:00 9/27 11:00 9/26 20:52  9/26 12:00 9/27 11:00  Range  (36.1C - 36.8C )  (80 - 98 )  (10 - 33 )  (109 - 173 )/ (19 - 62 )  (43 - 70 )  (77% - 100% )   As of 27-Sep-2023 08:00:00, patient is on 4 L/min of oxygen via nasal cannula.      Pain reported at 9/27 8:00: 7  Pain reported at 9/26 4:00: 0  Pain reported at 9/27 2:00: 9 = Severe    ---- Intake and Output  -----  Mn/Dy/Year Time  Intake   Output  Net  Sep 27, 2023 6:00 am  530   0  530  Sep 26, 2023 10:00 pm  605   2400  -1795  Sep 26, 2023 2:00 pm  1090   0  1090    The Intake and Output Totals for the last 24 hours are:      Intake   Output  Net      1200   2400  -175    Physical Exam Narrative:  ·  Physical Exam:    Constitutional: thin adult female, lying in bed, awake  Eyes: NC/AT, dry mucous membranes, poor dentition  Respiratory/Thorax:  on 4L NC  Cardiovascular: RRR, systolic murmur noted  Gastrointestinal: Soft, nontender  Extremities: RUE with ace wrap overlying RUE AVG site. Right hand edematous but warm  Skin: sacral wound not examined today    Medication:    Medications:          Continuous Medications       --------------------------------  No continuous medications are active       Scheduled Medications       --------------------------------    1. Collagenase 250 Units/ gram Topical:  1  application(s)  Topical  Daily    2. DAPTOmycin IV Piggy Back:  350  mg  IntraVenous Piggyback  Every 48 Hours    3. Levothyroxine Injectable:  87.5  microgram(s)  IntraVenous Push  Daily    4. Melatonin:   3  mg  Oral  Daily 1800    5. Midodrine:  10  mg  Oral  Every 8 Hours    6. Pantoprazole Injectable:  40  mg  IntraVenous Push  Every 12 Hours    7. Piperacillin - Tazobactam 2.25 grams/Iso-osmotic 50 mL Premix IVPB:  50  mL  IntraVenous Piggyback  Every 8 Hours    8. Thiamine IV Piggy Back:  500  mg  IntraVenous Piggyback  3 Times a Day         PRN Medications       --------------------------------    1. Acetaminophen:  650  mg  Oral  Every 4 Hours    2. Albuterol 2.5 mg - Ipratropium 0.5 mg/ 3 mL Neb Soln:  3  mL  Inhalation  4 Times a Day    3. Dextrose 50% in Water Injectable:  25  gram(s)  IntraVenous Push  Every 15 Minutes    4. Dextrose 50% in Water Injectable:  12.5  gram(s)  IntraVenous Push  Every 15 Minutes    5. Glucagon Injectable:  1  mg  IntraMuscular  Every 15 Minutes    6. HYDROmorphone Injectable:  0.4  mg  IntraVenous Push  Every 4 Hours    7. Ondansetron Injectable:  4  mg  IntraVenous Push  Every 6 Hours           Currently Suspended Medications       --------------------------------    1. oxyCODONE Immediate Release:  5  mg  Oral  Every 4 Hours      Recent Lab Results:    Results:    CBC: 9/27/2023 02:29              \     Hgb     /                              \     9.6 L    /  WBC  ----------------  Plt               10.0       ----------------    49 L            /     Hct     \                              /     29.0 L    \            RBC: 3.29 L    MCV: 88           CMP: 9/27/2023 02:29  NA+        Cl-     BUN  /                         134 L   98    14  /  --------------------------------  Glucose                ---------------------------  109 H    K+     HCO3-   Creat \                         3.8    27    1.73 H \           \  T Bili  /                    \  2.1 H /  AST  x ---- x ALT        5 Lx ---- x 6 L         /  Alk P   \               /  157 H \  Calcium : 9.0     Anion Gap : 13     Albumin : 3.1 L    T Protein : 5.5 L           Coagulation: 9/27/2023 02:29  PT  /                     18.3 H /  -------<    INR          ----------<      1.6 H  PTT\                    41 H \                       Assessment and Plan:   Daily Risk Screen:  ·  Does patient have an indwelling urinary catheter? n/a consulting service   ·  Does patient have a central line? n/a consulting service     Code Status:  ·  Code Status DNAR with Added Limitations     Assessment:    Ms Kesha Lowe is a 53yoF w/PMHx ESRD on iHD MWF, COPD on 6L at baseline,  HFmrEF (45-50% with moderate to severely elevated RVSP 9/2023)  HTN, pulmonary hemosiderosis (hx DAH 2017 2/2 huffing). Who is admitted to  CTICU for RUE AVF exposed graft c/b bleeding 9/22/23 with current [facility] [place] [facility] hospital stay c/b hypoglycemia, anemia with autoantibodies , hypotension.   ID consulted for c/f AVG infection and chronic sacral decubitus wound, patient with vancomycin allergy.     #RUE AVG  with exposed graft material  -must be assumed to be infected  ::Occurred 9/22 while at dialysis s/p sutures by IR and control of bleeding   -Vascular surgery on board and plan for OR 9/26/23 with ACS. Unclear timing of surgery due to anemia  requiring blood transfusion  -currently on linezolid (vancomycin allergy) and pip-tazo  -blood culture 9/23 and 9/25 NGTD    #Sacral decubitus wound  -ACS following, plan for debridement on 9/26    #History of Acinetobacter bacteremia (treatment course completed) and staph lugdenensis bacteremia in July    Recommendations  Continue daptomycin and Zosyn for 24 hours post vascular graft resection.  ID will sign off.    Patient discussed with Dr. Pina Hancock, PGY-3 Internal Medicine  ID Team A [id number]  New consults [id number]    Attestation:   Note Completion:  I am a:  Resident/Fellow   Attending Attestation I saw and evaluated the patient.  I personally obtained the key and critical portions of the history and physical exam or was physically present for key and  critical portions performed by the  resident/fellow. I reviewed the resident/fellow?s documentation and discussed the patient with the resident/fellow.  I agree with the resident/fellow?s medical decision making as documented in the note.   I personally evaluated the patient on 27-Sep-2023         Electronic Signatures:  Mich Heller (MD)  (Signed 27-Sep-2023 16:44)   Authored: Assessment and Plan, Note Completion   Co-Signer: Service, Subjective Data, Objective Data, Assessment and Plan, Note Completion  Jessenia Hancock (Resident))  (Signed 27-Sep-2023 11:45)   Authored: Service, Subjective Data, Objective Data, Assessment  and Plan, Note Completion      Last Updated: 27-Sep-2023 16:44 by Mich Heller)

## 2023-09-30 NOTE — PROGRESS NOTES
Service: Nephrology     Subjective Data:   VASQUEZ PAK is a 52 year old Female who is Hospital Day # 19.     Patient continues to have anorexia,  Scheduled to have dialysis today,  Pseudobulbar patient BUN today 107 creatinine 644 potassium 5.1 sodium 129.  Patient serum calcium remains to be elevated at 11.1 she received another dose of zoledronic acid 2 days ago .  Patient  chest x-ray shows mild improvement on her CHF we will attempt to remove fluid via ultrafiltration with dialysis.    Objective Data:   Physical Exam by System:    Constitutional: Alert oriented  Asthenic habitus   Eyes: Sunken eyes pupils react to light and accommodation   ENMT: mucous membranes moist, no apparent injury,  no lesions seen   Head/Neck: Full range of motion no thyromegaly   Respiratory/Thorax: Bibasilar inspiratory crackles  and scattered expiratory wheezing anteriorly   Cardiovascular: 1/6 sternal border systolic murmur   Gastrointestinal: Active peristalsis no rebound or  guarding.  She had an NG tube from the left nostril   Genitourinary: No CVA tenderness   Musculoskeletal: Decreased muscle tone   Extremities: There is a dark spot in the tip of the  index fingers suggestive of old embolic episode   Neurological: No tremors no asterixis   Breast: No masses, tenderness, no discharge or discoloration   Lymphatic: No significant lymphadenopathy   Psychological: Appropriate mood and behavior   Skin: Poor turgor     Recent Lab Results:    Results:    CBC: 8/14/2023 04:33              \     Hgb     /                              \     12.2       /  WBC  ----------------  Plt               10.6       ----------------    382              /     Hct     \                              /     39.2       \            RBC: 4.23     MCV: 93           CMP: 8/14/2023 04:33  NA+        Cl-     BUN  /                         129 L   82 L    107 HH /  --------------------------------  Glucose                 ---------------------------  82    K+     HCO3-   Creat \                         5.1    31    6.44 H \           \  T Bili  /                    \  0.8  /  AST  x ---- x ALT        23 x ---- x 11         /  Alk P   \               /  119 H \  Calcium : 11.1 H    Anion Gap : 21 H     Albumin : 4.7     T Protein : 8.8 H            I have reviewed these laboratory results:    Comprehensive Metabolic Panel  14-Aug-2023 04:33:00      Result Value    Lab Comment:  BUN CALLED RB TO HARJIT LACKEY , 08/14/2023 07:51    Glucose, Serum  82    NA  129   L   K  5.1    CL  82   L   Bicarbonate, Serum  31    Anion Gap, Serum  21   H   BUN  107   HH   CREAT  6.44   H   GFR Female  7   A   Calcium, Serum  11.1   H   ALB  4.7    ALKP  119   H   T Pro  8.8   H   T Bili  0.8    Alanine Aminotransferase, Serum  11    Aspartate Transaminase, Serum  23      Complete Blood Count  14-Aug-2023 04:33:00      Result Value    White Blood Cell Count  10.6    Nucleated Erythrocyte Count  0.0    Red Blood Cell Count  4.23    HGB  12.2    HCT  39.2    MCV  93    MCHC  31.1   L   PLT  382    RDW-CV  18.7   H       Radiology Results:    Results:        Impression:    Mild interstitial prominence in the lungs, related to pulmonary  edema/congestive heartfailure slightly improved from previous study.        Xray Chest 1 View [Aug 14 2023  8:59AM]      Assessment and Plan:        Additional Dx:   Abnormal electrocardiogram: Onset Date: 16-Feb-2023, Entered  Date: 16-Feb-2023 08:07   Shortness of breath at rest: Onset Date: 10-Mar-2020, Entered  Date: 10-Mar-2020 09:46   Atypical chest pain: Entered Date: 28-Jan-2020 14:41   Tachycardia: Entered Date: 16-Jan-2020 07:44   Acute respiratory failure with hypoxia: Entered Date: 15-Laz-2020  06:46   Hyperkalemia: Entered Date: 15-Laz-2020 06:46   Fluid overload: Entered Date: 15-Laz-2020 06:46   Respiratory failure: Entered Date: 13-Nov-2019 03:17   Diastolic heart failure secondary to hypertension:  Entered  Date: 27-Oct-2019 03:29   Hypertensive crisis: Entered Date: 27-Oct-2019 03:28   Pre-operative exam: Entered Date: 12-Sep-2019 13:28   Pre-operative exam: Entered Date: 12-Sep-2019 13:28   Hypertensive heart disease without heart failure: Entered Date:  23-Nov-2018 10:26   End-stage renal disease: Entered Date: 23-Nov-2018 10:26   Hypertensive heart disease with heart failure: Entered Date:  23-Nov-2018 10:26   Acute on chronic diastolic congestive heart failure: Entered  Date: 23-Nov-2018 10:26   Idiopathic pulmonary hemosiderosis: Entered Date: 21-Nov-2018  13:11   Troponin level elevated: Entered Date: 19-Nov-2018 12:15   PNA (pneumonia): Entered Date: 17-Nov-2018 18:21   Dependence on renal dialysis: Entered Date: 16-Jun-2018 11:33   Non-ST elevation myocardial infarction (NSTEMI), type 2: Entered  Date: 15-Mayank-2018 15:41   Abnormal EKG: Onset Date: 06-Mar-2018, Entered Date: 06-Mar-2018  11:34   Infection due to Streptococcus pneumoniae: Onset Date: 16-Feb-2018,  Entered Date: 16-Feb-2018 18:40   Hemoptysis: Onset Date: 16-Feb-2018, Entered Date: 16-Feb-2018  18:40   Abnormal chest CT: Onset Date: 16-Feb-2018, Entered Date: 16-Feb-2018  18:39   Acute on chronic respiratory failure: Onset Date: 16-Feb-2018,  Entered Date: 16-Feb-2018 18:39   Chronic respiratory failure: Onset Date: 16-Feb-2018, Entered  Date: 16-Feb-2018 00:02   ESRD (end stage renal disease) on dialysis: Entered Date: 09-Feb-2018  22:25   History of hemoptysis: Entered Date: 08-Feb-2018 17:46   Hypertensive heart disease without heart failure: Entered Date:  31-Jan-2018 21:11   End-stage renal disease: Entered Date: 31-Jan-2018 21:11   Diarrhea: Entered Date: 29-Jan-2018 11:00   Sepsis: Entered Date: 29-Jan-2018 11:00   HCAP (healthcare-associated pneumonia): Entered Date: 29-Jan-2018  10:59   Diffuse pulmonary alveolar hemorrhage: Entered Date: 04-Dec-2017  07:07   Hypertensive heart disease without heart failure: Entered Date:   26-Jul-2017 19:35   Encounter for preadmission testing: Onset Date: 09-Feb-2017,  Entered Date: 13-Feb-2017 08:52   Encounter for other preprocedural examination: Entered Date:  13-Dec-2016 15:54       Medical History:   CHF, acute on chronic: Entered Date: 15-Nov-2019 08:58   ESRD (end stage renal disease) on dialysis: Entered Date: 15-Nov-2019  08:58   Volume overload: Entered Date: 15-Nov-2019 08:58   Current nonadherence to medical treatment: Entered Date: 13-Nov-2019  06:09   CHF (congestive heart failure): Onset Date: 30-Jan-2018, Entered  Date: 30-Jan-2018 15:45   Anemia: Entered Date: 30-Nov-2017 20:07   Pulmonary hemorrhage: Entered Date: 30-Nov-2017 19:49   Hypothyroidism: Entered Date: 26-Jul-2017 01:23   COPD (chronic obstructive pulmonary disease): Entered Date:  26-Jul-2017 01:22   Hypertension: Entered Date: 26-Jul-2017 01:22   ESRD (end stage renal disease) on dialysis: Entered Date: 26-Jul-2017  01:22       Surg History:   Tachycardia: Onset Date: 14-Nov-2019, Entered Date: 14-Nov-2019  09:57   Shortness of breath at rest: Onset Date: 28-Oct-2019, Entered  Date: 28-Oct-2019 10:55   Chest pain: Onset Date: 29-Nov-2017, Entered Date: 29-Nov-2017  10:18   Presence of surgically created primary arteriovenous shunt for hemodialysis : Entered Date: 26-Jul-2017 01:22    Code Status:  ·  Code Status Full Code       Impression 1: End-stage renal disease   Plan for Impression 1: Continue current dialysis  prescription   Impression 2: Acute hypercalcemia   Plan for Impression 2: Culprits most likely immobility  Continue Cinacalcet low calcium bath, as needed zoledronic acid   Impression 3: Severe protein calorie malnutrition   Plan for Impression 3: Continue cyproheptadine encourage  protein intake   Impression 4: Congestive heart failure   Plan for Impression 4: Continue ultrafiltration on  dialysis       Electronic Signatures:  Daren Bocanegra)  (Signed 14-Aug-2023 12:48)   Authored: Service,  Subjective Data, Objective Data, Assessment  and Plan, Note Completion      Last Updated: 14-Aug-2023 12:48 by Daren Bocanegra)

## 2023-09-30 NOTE — PROGRESS NOTES
Service: Nephrology     Subjective Data:   VASQUEZ PAK is a 52 year old Female who is Hospital Day # 26.     Patient had dialysis today,  She is increased her dialysis  time by half an hour.    Objective Data:   Physical Exam by System:    Constitutional: Alert   Asthenic habitus   Eyes: pupils react to light and accommodation   ENMT: mucous membranes moist, no apparent injury,  no lesions seen   Head/Neck: Full range of motion no thyromegaly   Respiratory/Thorax: Minimal inspiratory rhonchi   Cardiovascular: 1/6 sternal border systolic murmur  no gallop no thrills or rubs   Gastrointestinal: Active peristalsis no rebound or  guarding.  She had an NG tube from the left nostril   Genitourinary: No CVA tenderness   Musculoskeletal: Decreased muscle tone   Extremities: There is a dark spot in the tip of the  index fingers suggestive of old embolic episode   Neurological: No tremors no asterixis   Breast: No masses, tenderness, no discharge or discoloration   Lymphatic: No significant lymphadenopathy   Psychological: Appropriate mood and behavior   Skin: Poor turgor     Assessment and Plan:        Additional Dx:   Abnormal electrocardiogram: Onset Date: 16-Feb-2023, Entered  Date: 16-Feb-2023 08:07   Shortness of breath at rest: Onset Date: 10-Mar-2020, Entered  Date: 10-Mar-2020 09:46   Atypical chest pain: Entered Date: 28-Jan-2020 14:41   Tachycardia: Entered Date: 16-Jan-2020 07:44   Acute respiratory failure with hypoxia: Entered Date: 15-Laz-2020  06:46   Hyperkalemia: Entered Date: 15-Laz-2020 06:46   Fluid overload: Entered Date: 15-Laz-2020 06:46   Respiratory failure: Entered Date: 13-Nov-2019 03:17   Diastolic heart failure secondary to hypertension: Entered  Date: 27-Oct-2019 03:29   Hypertensive crisis: Entered Date: 27-Oct-2019 03:28   Pre-operative exam: Entered Date: 12-Sep-2019 13:28   Pre-operative exam: Entered Date: 12-Sep-2019 13:28   Hypertensive heart disease without heart  failure: Entered Date:  23-Nov-2018 10:26   End-stage renal disease: Entered Date: 23-Nov-2018 10:26   Hypertensive heart disease with heart failure: Entered Date:  23-Nov-2018 10:26   Acute on chronic diastolic congestive heart failure: Entered  Date: 23-Nov-2018 10:26   Idiopathic pulmonary hemosiderosis: Entered Date: 21-Nov-2018  13:11   Troponin level elevated: Entered Date: 19-Nov-2018 12:15   PNA (pneumonia): Entered Date: 17-Nov-2018 18:21   Dependence on renal dialysis: Entered Date: 16-Jun-2018 11:33   Non-ST elevation myocardial infarction (NSTEMI), type 2: Entered  Date: 15-Mayank-2018 15:41   Abnormal EKG: Onset Date: 06-Mar-2018, Entered Date: 06-Mar-2018  11:34   Infection due to Streptococcus pneumoniae: Onset Date: 16-Feb-2018,  Entered Date: 16-Feb-2018 18:40   Hemoptysis: Onset Date: 16-Feb-2018, Entered Date: 16-Feb-2018  18:40   Abnormal chest CT: Onset Date: 16-Feb-2018, Entered Date: 16-Feb-2018  18:39   Acute on chronic respiratory failure: Onset Date: 16-Feb-2018,  Entered Date: 16-Feb-2018 18:39   Chronic respiratory failure: Onset Date: 16-Feb-2018, Entered  Date: 16-Feb-2018 00:02   ESRD (end stage renal disease) on dialysis: Entered Date: 09-Feb-2018  22:25   History of hemoptysis: Entered Date: 08-Feb-2018 17:46   Hypertensive heart disease without heart failure: Entered Date:  31-Jan-2018 21:11   End-stage renal disease: Entered Date: 31-Jan-2018 21:11   Diarrhea: Entered Date: 29-Jan-2018 11:00   Sepsis: Entered Date: 29-Jan-2018 11:00   HCAP (healthcare-associated pneumonia): Entered Date: 29-Jan-2018  10:59   Diffuse pulmonary alveolar hemorrhage: Entered Date: 04-Dec-2017  07:07   Hypertensive heart disease without heart failure: Entered Date:  26-Jul-2017 19:35   Encounter for preadmission testing: Onset Date: 09-Feb-2017,  Entered Date: 13-Feb-2017 08:52   Encounter for other preprocedural examination: Entered Date:  13-Dec-2016 15:54       Medical History:   CHF, acute on chronic:  Entered Date: 15-Nov-2019 08:58   ESRD (end stage renal disease) on dialysis: Entered Date: 15-Nov-2019  08:58   Volume overload: Entered Date: 15-Nov-2019 08:58   Current nonadherence to medical treatment: Entered Date: 13-Nov-2019  06:09   CHF (congestive heart failure): Onset Date: 30-Jan-2018, Entered  Date: 30-Jan-2018 15:45   Anemia: Entered Date: 30-Nov-2017 20:07   Pulmonary hemorrhage: Entered Date: 30-Nov-2017 19:49   Hypothyroidism: Entered Date: 26-Jul-2017 01:23   COPD (chronic obstructive pulmonary disease): Entered Date:  26-Jul-2017 01:22   Hypertension: Entered Date: 26-Jul-2017 01:22   ESRD (end stage renal disease) on dialysis: Entered Date: 26-Jul-2017  01:22       Surg History:   Tachycardia: Onset Date: 14-Nov-2019, Entered Date: 14-Nov-2019  09:57   Shortness of breath at rest: Onset Date: 28-Oct-2019, Entered  Date: 28-Oct-2019 10:55   Chest pain: Onset Date: 29-Nov-2017, Entered Date: 29-Nov-2017  10:18   Presence of surgically created primary arteriovenous shunt for hemodialysis : Entered Date: 26-Jul-2017 01:22    Code Status:  ·  Code Status Full Code       Impression 1: End-stage renal disease   Plan for Impression 1: Same dialysis prescription   Impression 2: Hypercalcemia   Plan for Impression 2: Continue Cinacalcet and zoledronic  acid as needed,  We will review parathyroid hormone levels this week   Impression 3: Anemia renal disease   Plan for Impression 3: Continue to monitor CBC   Impression 4: Severe protein calorie malnutrition   Plan for Impression 4: Continue protein supplements   Impression 5: Anorexia   Plan for Impression 5: Continue cyproheptadine       Electronic Signatures:  Daren Bocanegra)  (Signed 21-Aug-2023 14:00)   Authored: Service, Subjective Data, Objective Data, Assessment  and Plan, Note Completion      Last Updated: 21-Aug-2023 14:00 by Daren Bocanegra)

## 2023-09-30 NOTE — PROGRESS NOTES
Service: Cardiology     Subjective Data:   VASQUEZ PAK is a 53 year old Female who is Hospital Day # 7.     No acute events overnight.   Patient initiated on dialysis and transferred out of MICU.   More awake and cooperative.    Objective Data:     Objective Information:      T   P  R  BP   MAP  SpO2   Value  37  95  16  166/74   105  99%  Date/Time 9/28 8:45 9/27 23:59 9/27 23:59 9/28 8:45  9/28 8:45 9/27 23:59  Range  (36.2C - 37C )  (84 - 113 )  (11 - 29 )  (151 - 173 )/ (50 - 74 )  (90 - 105 )  (90% - 100% )   As of 28-Sep-2023 08:24:00, patient is on 5 L/min of oxygen via nasal cannula.  Highest temp of 37 C was recorded at 9/28 8:45      Pain reported at 9/27 16:00: 0  Pain reported at 9/28 11:00: 8 = Severe    ---- Intake and Output  -----  Mn/Dy/Year Time  Intake   Output  Net  Sep 28, 2023 2:00 pm  210   1  209  Sep 28, 2023 6:00 am  75   0  75  Sep 27, 2023 10:00 pm  355   2002  -1647    The Intake and Output Totals for the last 24 hours are:      Intake   Output  Net      710   2003  -1293    Physical Exam Narrative:  ·  Physical Exam:    General: NAD, awake and cooperative  HEENT: EOMI, MMM, no LAD, no thyroid nodule or enlargement  Neck: -JVD, supple  Cardiac: Normal S, S2. + systolic murmur  Lungs: Clear lungs bilaterally. Good bilateral air entry   Abdomen: Soft, non-tender, non-distended   Extremities: No LE edema   Neuro: No apparent focal deficits      Medication:    Medications:          Continuous Medications       --------------------------------  No continuous medications are active       Scheduled Medications       --------------------------------    1. Collagenase 250 Units/ gram Topical:  1  application(s)  Topical  Daily    2. DAPTOmycin IV Piggy Back:  350  mg  IntraVenous Piggyback  Every 48 Hours    3. Ergocalciferol (Vitamin D2) Oral Liquid:  8000  International Unit(s)  Oral  Daily    4. Levothyroxine Injectable:  87.5  microgram(s)  IntraVenous Push  Daily    5.  Lidocaine 4% TransDermal:  1  patch  TransDermal  Every 24 Hours    6. Melatonin:  3  mg  Oral  Daily 1800    7. Pantoprazole Injectable:  40  mg  IntraVenous Push  Every 12 Hours    8. Piperacillin - Tazobactam 2.25 grams/Iso-osmotic 50 mL Premix IVPB:  50  mL  IntraVenous Piggyback  Every 8 Hours    9. Thiamine IV Piggy Back:  500  mg  IntraVenous Piggyback  3 Times a Day         PRN Medications       --------------------------------    1. Acetaminophen:  650  mg  Oral  Every 4 Hours    2. Albuterol 2.5 mg - Ipratropium 0.5 mg/ 3 mL Neb Soln:  3  mL  Inhalation  4 Times a Day    3. Dextrose 50% in Water Injectable:  25  gram(s)  IntraVenous Push  Every 15 Minutes    4. Dextrose 50% in Water Injectable:  12.5  gram(s)  IntraVenous Push  Every 15 Minutes    5. Glucagon Injectable:  1  mg  IntraMuscular  Every 15 Minutes    6. HYDROmorphone Injectable:  0.4  mg  IntraVenous Push  Every 4 Hours    7. Ondansetron Injectable:  4  mg  IntraVenous Push  Every 6 Hours           Currently Suspended Medications       --------------------------------    1. oxyCODONE Immediate Release:  5  mg  Oral  Every 4 Hours      Recent Lab Results:    Results:    CBC: 9/28/2023 05:08              \     Hgb     /                              \     8.5 L    /  WBC  ----------------  Plt               11.4 H    ----------------    59 L            /     Hct     \                              /     26.4 L    \            RBC: 2.90 L    MCV: 91           RFP: 9/28/2023 05:08  NA+        Cl-     BUN  /                         140    100    9  /  --------------------------------  Glucose                ---------------------------  80    K+     HCO3-   Creat \                         3.4 L   28    1.27 H  \  Calcium : 9.5Anion Gap : 15          Albumin : 3.2 L    Phos : 2.6      Radiology Results:    Results:        Conclusion:  CONCLUSIONS:  1. Left ventricular systolic function is hyperdynamic with a 75-80% estimated ejection  fraction.  2. Right ventricular volume and pressure overload.  3. There is reduced right ventricular systolic function.  4. The right ventricle is mildly enlarged.  5. Moderate aortic valve regurgitation.  6. There is moderate to severe aortic valve cusp calcification.  7. Mild mitral valve regurgitation.  8. The mitral valve is moderately thickened. The mitral valve appears to be degenerative.  9. There is severe mitral annular calcification.  10. Severe tricuspid regurgitation visualized.  11. There is significantly restricted tricuspid valve septal leaflet mobility with pseudoprolapse of the anterior leaflet.  12. The tricuspid valve annulus appears dilated.  13. Severely elevated right ventricular systolic pressure.      [id number] Richy Emmanuel MD  Electronically signed on 9/25/2023 at 3:42:55 PM        *** Final ***     Echocardiogram [Sep 25 2023  3:43PM]      Assessment and Plan:   Comorbidities:  ·  Comorbidity Other     Code Status:  ·  Code Status DNAR with Added Limitations     Assessment:    TUCKERMIRACLE VASQUEZRAYA FERRO is a 53 year old Female with history of COPD/asthma (6L NC), HFpEF, ESRD (MWF), HTN, HLD, hypothyroidism, pulmonary hemosiderosis, and  diffuse alveolar hemorrhage in setting of inhalation of cleaning fluid (5/2017) transferred from [facility] for exposed RUE AVG and potential need for surgical intervention.     Cardiology consulted in light of echocardiogram findings of severe TR, restricted TV leaflet mobility and severely elevated RVSP.   Per echo report: Compared with echo study from 9/6/2023, the technique and dedicated RV images are different. In today's study the degree of tricuspid regurgitation is severe compared with moderate and the RVSP is now severely increased at 143 compared  to moderate to severe on the prior study. The LVEF is now hyperdynamic with EF 65-70% compared to mildly reduced (45%).  Last HD session prior to the most recent echocardiogram was on 9/22 and noted to have  only half sessions due to hemodynamic instability in the setting of sepsis.      # Severe TR  # Severe pHTN  - Likely secondary in the setting of severe pHTN (COPD on home oxygen, pulmonary hemosiderosis and prior pulmonary insults, HFpEF and volume overload in the setting of ESRD). Patient is hypervolumic in the setting of missed HD sessions and incomplete  HD sessions lately   - Echo showing TV annular dilatation consistent with secondary/functional TR in the setting of RV pressure and volume overload. TV leaflet thickening and restricted mobility also noted which is also contributing to her severe TR    # Severe concentric LVH and RV hypertrophy  On further review of the echocardiogram images, there is severe LV wall thickening consistent with severe concentric LVH as well as increased RV wall thickening and valvular thickening. Will need to rule out infiltrative cardiac disease such as amyloid  with cMRI though those changes could be seen with severe systemic hypertension and pHTN.       Recommendations:  - Patient will need aggressive optimization of her volume status with HD once able (temp dialysis line placed, pending AVG repair with vascular surgery)  - No current indication for surgical intervention on her TV given this is likely secondary/function   - Will need to repeat echocardiogram once patient euvolemic to re-assess degree of TR  - Would recommend RHC for further evaluation of her pHTN next week once more optimzied  - Once patient optimized would also recommend a Cardiac MRI to rule out any infiltrative disease (order next week)      Case discussed with Dr. Phillips    Attestation:   Note Completion:  I am a:  Resident/Fellow   Attending Attestation I saw and evaluated the patient.  I personally obtained the key and critical portions of the history and physical exam or was physically present for key and  critical portions performed by the resident/fellow. I reviewed the resident/fellow?s  documentation and discussed the patient with the resident/fellow.  I agree with the resident/fellow?s medical decision making as documented in the note.     I personally evaluated the patient on 28-Sep-2023         Electronic Signatures:  Nader Perea (Fellow))  (Signed 28-Sep-2023 14:34)   Authored: Service, Subjective Data, Objective Data, Assessment  and Plan, Note Completion  Lc Phillips)  (Signed 29-Sep-2023 10:10)   Authored: Note Completion   Co-Signer: Service, Subjective Data, Objective Data, Assessment and Plan, Note Completion      Last Updated: 29-Sep-2023 10:10 by Lc Phillips)

## 2023-09-30 NOTE — PROGRESS NOTES
Service: Vascular Surgery     Subjective Data:   VASQUEZ PAK is a 53 year old Female who is Hospital Day # 6.     O/N - patient continues to be lethargic and intermittently confused A&O x 1. BG levels are trending downwards since patient taken off of D10.    Objective Data:     Objective Information:        T   P  R  BP   MAP  SpO2   Value  36.7  92  17  157/54   70  99%  Date/Time 9/27 12:00 9/27 12:00 9/27 12:00 9/26 20:52  9/26 12:00 9/27 12:00  Range  (36.1C - 36.8C )  (80 - 98 )  (10 - 33 )  (109 - 173 )/ (19 - 62 )  (43 - 70 )  (77% - 100% )   As of 27-Sep-2023 12:00:00, patient is on 4 L/min of oxygen via nasal cannula with humidification.      ---- Intake and Output  -----  Mn/Dy/Year Time  Intake   Output  Net  Sep 27, 2023 6:00 am  530   0  530  Sep 26, 2023 10:00 pm  605   2400  -1795  Sep 26, 2023 2:00 pm  1090   0  1090    The Intake and Output Totals for the last 24 hours are:      Intake   Output  Net      2225   2400  -175    Physical Exam Narrative:  ·  Physical Exam:    Constitutional: Cachectic AA female. Lying in bed.   Eyes: Sclera clear. Eyes moving out of sync with R. eye persistently abducted  Respiratory/Thorax: Breathing shallowly on 4L NC, satting in 90s  Cardiovascular: RRR on monitor  Gastrointestinal: Soft, nontender  Extremities: RUE with ace wrap overlying RUE AVG site; exposed PTFE graft with ligation sutures, no active bleeding. Palpable RUE radial pulse, warm hand, worsening edema towards fingers/palm, up to wrist. Weak R. motor on RUE.  Skin: Large sacral decubitus ulceration with undermining  Fibrinous base with some granulation tissue, beige/grey drainage on packing, no bleeding, no purulence  See extremities      Recent Lab Results:    Results:    CBC: 9/27/2023 02:29              \     Hgb     /                              \     9.6 L    /  WBC  ----------------  Plt               10.0       ----------------    49 L            /     Hct     \                               /     29.0 L    \            RBC: 3.29 L    MCV: 88           CMP: 9/27/2023 02:29  NA+        Cl-     BUN  /                         134 L   98    14  /  --------------------------------  Glucose                ---------------------------  109 H    K+     HCO3-   Creat \                         3.8    27    1.73 H \           \  T Bili  /                    \  2.1 H /  AST  x ---- x ALT        5 Lx ---- x 6 L         /  Alk P   \               /  157 H \  Calcium : 9.0     Anion Gap : 13     Albumin : 3.1 L    T Protein : 5.5 L           Coagulation: 9/27/2023 02:29  PT  /                    18.3 H /  -------<    INR          ----------<      1.6 H  PTT\                    41 H \                       Radiology Results:    Results:        Conclusion:  Normal sinus rhythm  Rightward axis  Septal infarct (cited on or before 12-SEP-2019)  Abnormal ECG  When compared with ECG of 02-SEP-2023 02:07, (unconfirmed)  ST elevation now present in Anterior leads  Nonspecific T wave abnormality now evident in Lateral leads  Confirmed by Gonzalo Mitchell ([id number]) on 9/24/2023 2:41:35 PM     Electrocardiogram 12 Lead [Sep 24 2023  2:53PM]      Impression:    1.  Worsened diffused ground-glass opacities involving the both  lungs, worsened pulmonary edema/fluid overload.  2. Cardiac silhouette is moderately enlarged. Aorta is tortuous.  Pulmonary vessels are congested.  3. No focal consolidation.  4. No pleural effusion or pneumothorax seen.  5. Removal of the ET tube, enteric tube, pH probe, and the IJ  catheter.  6. Vascular stent along the right axillary artery.           MACRO:  None     Xray Chest 1 View [Sep 24 2023 10:09AM]      Assessment and Plan:   Daily Risk Screen:  ·  Does patient have a central line? yes   ·  Central Line Type dialysis     Code Status:  ·  Code Status DNAR with Added Limitations     Assessment:    53F PMHx COPD, ESRD, HFpEF admitted as transfer from [facility] following episode of  bleeding from RUE AVG at dialysis. Exposed graft, large skin defect. Graft ligated  by IR at [facility] and transferred to [facility].    Tentative plan for operative resection of exposed graft 9/23, however patient persistently hypoglycemic with NPO, somnolent, intermittent tachycardia/hypotension likely related to hypoglycemia and therefore OR canceled and patient moved to MICU for closer  monitoring.    After discussion with MICU team, they noted increased concern regarding her TTE - MICU team currently exploring RV strain +/- infective endocarditis; pending cardiology and possibly cardiac surgery involvement.     Plan:  - Plan for OR for resection of exposed RUE AVG and temp HD cath placement, concurrently with ACS for sacral wound debridement -- OR date pending stabilization of Hgb and platelet count. Please  keep remaining 2U pRBCs from [facility] on hold for OR   - Continue abx until 24hrs post-AVG resection pending negative blood cultures -- continue daptomycin in the setting of thrombocytopenia with linezolid.  If thrombocytopenia persists, recommend ID consult for alternative abx as zosyn is also associated with thrombocytopenia  - Nephrology following for HD needs.   - Renal diet as tolerated; please continue to monitor BG levels as patient is getting hypoglycemic this AM  - Remainder of care per ICU team, appreciate care by MICU team  - Please page with questions/concerns    Patient was discussed with attending, Dr. Wu.    Sandi Hong MD, MSc  Vascular Surgery  [place] [id number]      Attestation:   Note Completion:        Electronic Signatures:  Kamaljit Wu)  (Signed 29-Sep-2023 16:00)   Authored: Note Completion   Co-Signer: Service, Subjective Data, Objective Data, Assessment and Plan, Note Completion  Sandi Hong (Resident))  (Signed 27-Sep-2023 13:22)   Authored: Service, Subjective Data, Objective Data, Assessment  and Plan, Note Completion      Last Updated: 29-Sep-2023 16:00 by  Kamaljit Wu)

## 2023-09-30 NOTE — PROGRESS NOTES
"    Service:   Critical Care Service:  ·  Service SICU     Subjective Data:   ID Statement:  VASQUEZ PAK is a 53 year old Female who is Hospital Day # 4 and ICU Day #3.    Objective Data:     ·  Objective Information      T   P  R  BP   MAP  SpO2   Value  36.2  103  11  157/44   74  80%  Date/Time 9/25 4:00 9/25 7:00 9/25 7:00 9/24 5:00  9/24 5:00 9/25 7:00  Range  (36.1C - 36.5C )  (90 - 109 )  (11 - 27 )  (128 - 173 )/ (44 - 128 )  (71 - 141 )  (80% - 100% )   As of 25-Sep-2023 04:00:00, patient is on 3 L/min of oxygen via nasal cannula.      Pain reported at 9/25 4:00: 0  Pain reported at 9/24 18:00: unable to assess;  pt \"feels better\"      Direct Arterial Blood Pressure  Systolic 104 (77 - 160) 9/25 7:00  Diastolic (mm Hg) 24 (24 - 74) 9/25 7:00  Mean (mm Hg) 45 (43 - 82) 9/25 7:00  Pulse Pressure (mm Hg) 80 (13 - 116) 9/25 7:00      ---- Intake and Output  -----  Mn/Dy/Year Time  Intake   Output  Net  Sep 25, 2023 6:00 am  370   0  370  Sep 24, 2023 10:00 pm  430   0  430  Sep 24, 2023 2:00 pm  240   0  240    The Intake and Output Totals for the last 24 hours are:      Intake   Output  Net      1040   null  null    Date:            Weight/Scale Type:  22-Sep-2023 21:00  44.6  kg / bed         Intake                                   Output      Enteral - Oral         220 mL   IV Fluids              820 mL      ---- Intake and Output  -----  Mn/Dy/Year Time  Intake   Output  Net  Sep 25, 2023 6:00 am  370   0  370  Sep 24, 2023 10:00 pm  430   0  430  Sep 24, 2023 2:00 pm  240   0  240    The Intake and Output Totals for the last 24 hours are:      Intake   Output  Net      1040   null  null    Physical Exam by System:    Neurological: awakens to name, follows basic commands,  no focal deficits appreciated   Cardiovascular: Regular, rate and rhythm, no murmurs,  2+ equal pulses of the extremities, normal S 1and S 2   Respiratory/Thorax: Patient on 3L NC, equal chest  expansion, thorax symmetric "   Gastrointestinal: Soft, nontender   Skin: Warm, dry. sacral decub extensive, mepelex  in place   Musculoskeletal: ROM intact, no joint swelling   Constitutional: cachectic female. No acute distress   Eyes: EOMI, clear sclera   ENMT: mucous membranes moist, no apparent injury,  no lesions seen   Head/Neck: no apparent injury, trachea midline   Extremities: RUE with ace wrap in place   Psychological: no acute distress     Hale T Coma Scale:    Best Eye Response: (E3) to speech   Best Motor Response: (M6) obeys commands   Best Verbal Response: (V4) confused   Rian Score: 13     Allergies:       Allergies:  ·  vancomycin : Resp Distress, Itching  ·  carvedilol : Unknown  ·  doxazosin : Unknown  ·  benazepril : Unknown    Medications:    Medications:          Continuous Medications       --------------------------------    1. Dextrose 10% in Water Infusion:  500  mL  IntraVenous  <Continuous>         Scheduled Medications       --------------------------------    1. Levothyroxine:  175  microgram(s)  Oral  Daily    2. Melatonin:  3  mg  Oral  Daily 1800    3. Midodrine:  5  mg  Oral  3 Times a Day    4. Piperacillin - Tazobactam 2.25 grams/Iso-osmotic 50 mL Premix IVPB:  50  mL  IntraVenous Piggyback  Every 8 Hours         PRN Medications       --------------------------------    1. Acetaminophen:  650  mg  Oral  Every 4 Hours    2. Albuterol 2.5 mg - Ipratropium 0.5 mg/ 3 mL Neb Soln:  3  mL  Inhalation  4 Times a Day    3. Calcium Gluconate 1 gram/ NaCL 0.67% 50 mL Premix IVPB:  50  mL  IntraVenous Piggyback  Every 6 Hours    4. Calcium Gluconate 2 gram/ NaCL 0.67% 100 mL Premix IVPB:  100  mL  IntraVenous Piggyback  Every 6 Hours    5. Dextrose 50% in Water Injectable:  25  gram(s)  IntraVenous Push  Every 15 Minutes    6. Dextrose 50% in Water Injectable:  12.5  gram(s)  IntraVenous Push  Every 15 Minutes    7. Glucagon Injectable:  1  mg  IntraMuscular  Every 15 Minutes    8. Magnesium Sulfate 2 gram/Sterile  Water 50 mL Premix Soln:  2  gram(s)  IntraVenous Piggyback  Every 6 Hours    9. Magnesium Sulfate 4 gram/Sterile Water 100 mL Premix Soln:  4  gram(s)  IntraVenous Piggyback  Every 6 Hours    10. Melatonin:  3  mg  Oral  Once    11. Ondansetron Injectable:  4  mg  IntraVenous Push  Every 6 Hours    12. oxyCODONE Immediate Release:  5  mg  Oral  Every 4 Hours    13. Potassium Chloride 20 mEq/Sterile Water 100 mL Premix IVPB:  20  mEq  IntraVenous Piggyback  Every 6 Hours    14. Sodium Chloride 0.9% Injectable Flush:  10  mL  IntraVenous Flush  Every 8 Hours and as Needed        Recent Lab Results:    Results:    CBC: 9/25/2023 04:24              \     Hgb     /                              \     7.2 L    /  WBC  ----------------  Plt               18.2 H    ----------------    97 L            /     Hct     \                              /     20.6 L    \            RBC: 2.33 L    MCV: 88           RFP: 9/25/2023 04:24  NA+        Cl-     BUN  /                         134 L   94 L    31 H /  --------------------------------  Glucose                ---------------------------  72 L    K+     HCO3-   Creat \                         4.3    29    3.57 H \  Calcium : 8.8Anion Gap : 15          Albumin : 2.7 L    Phos : 3.3          Assessment and Plan:   Daily Risk Screen:  ·  Does patient have a central line? no   ·  Does patient have an indwelling urinary catheter? no   ·  Is the patient intubated? 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 Patient was admitted on 9/22/2023 as a transfer from [facility] following episode of bleeding  from RUE AVG at dialysis. Exposed graft, large  skin defect. Graft ligated by IR at [facility] and transferred to [facility] for further evaluation and management by vascular surgery. Patient was tentatively planned for operative resection of exposed graft 9/23,  however patient was persistently hypoglycemic with NPO, somnolent, intermittent tachycardia/hypotension likely related to hypoglycemia and therefore OR was canceled. Patient transferred to SICU for further management. Plan for OR on 9/26/2023.       Plan:  NEURO: Neuro intact, MAEx4 to commands, no focal deficits  - melatonin nightly as neededcodone prn pain control   - Ongoing neuro/neurovasc/pain assessments  - melatonin nightly prn  - PT/OT    CV: Severe pulmonary HTN, HFpEF, HTN, HLD. TTE 9/6 EF 45-50%, low normal RV function, mod LVH, LA severely dilated, RVSP 66mmHg. Acute diastolic hypotension requiring midodrine.   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 Currently on 3L NC  - IS hourly while awake  - OOB as tolerated  - consider pulm HTN consult   - CXR daily prn    GI: Was tolerating renal diet. Now with melena and Hgb drop. GI consulted  - NPO for EGD today  - start ppi twice a day  - OR tomorrow, keep NPO after midnight   - ongoing melena surveillance    : ESRD (Dialysis MWF at baseline via RUE AV graft)  - RFP BID and PRN   - Nephrology following for assessment of HD needs -> no RRT needs today  - Replete electrolytes judiciously    HEME: Several antibodies present, [place] assisting with appropriate cross match. Acute blood loss anemia likely from UGIB, Hgb at 6.3 on floor. Transfused 2 uncrossed RBC 9/23. Now with Hgb drop again to 6.5 today  - CBC BID and PRN  - Scds for dvt prophylaxis.  - No crossmatched blood  available as of yet  - Transfuse uncrossed RBC if becomes unstable with Hgb drop   - ongoing bleeding surveillance    ENDO: Hypoglycemia, hypothyroidism. On D10 infusion  - continue D10 infusion  - BG checks 1-2hrs  - Levothyroxine 175  microgram Oral  Daily     ID: Recent Acinetobacter bacteremia. Completed 14 day course of zosyn 9/3-9/5 and cefepime 9/5-9/16. Afebrile, leukocytosis. Patient on linezolid and Zosyn empirically. 9/23 blood cultures NGTD  - obtain ID consult -> continue current atb therapy  - onogoing sepsis surveillance    Skin: Large surface area sacral DTI. ACS and wound care consulted  - ACS will debride DTI in OR tomorrow  - keep Mepilex on for now    Lines: PIV x 2, left brachial arterial line     Dispo: Transfer to MICU stepdown. Discussed with Dr. Harris      Code Status:  ·  Code Status Full Code     Nutrition Diagnosis:  ·  Nutrition Diagnosis      Agree with dietitian?s assessment and diagnoses as stated.  A new diagnosis of Severe malnutrition related to chronic disease or condition as evidence  by severe muscle wasting, severe subcutaneous fat loss, large sacral wound.      Agree with dietitian?s assessment and diagnoses as stated.  A new diagnosis of Severe malnutrition related to chronic disease or condition as evidence  by severe muscle wasting, severe subcutaneous fat loss, large sacral wound.    Attestation:   Note Completion:  Provider/Team Pager # [phone]   Critical Care Patient I have reviewed and evaluated the most recent data and results, personally examined the patient, and formulated the plan of care as presented above.  This patient  was critically ill and required continued critical care treatment. Teaching and any separately billable procedures are not included in the time calculation.   Billing Provider Critical Care Time 60 minute(s)   Primary Critical Care Issue/Treatment (See Assessment and Plan for greater detail) -- For the nature of the critical condition and  treatment, this documentation has been prepared by the attending physician/FAVIAN- billing provider of these critical  care services.; -- This patient is, or has been, hemodynamically unstable.  We are treating with appropriate medications, as well as doing intensive diagnostic evaluation and monitoring. Please see assessment and plan above for greater detail.; -- This  patient is believed to have significant acute or end-stage renal failure requiring careful monitoring of fluid and respiratory status, including intensive treatment with appropriate medications or dialysis, as indicated. Please see assessment and plan  above for greater detail.         Electronic Signatures:  Maile Chaney (APRN-CNP)  (Signed 25-Sep-2023 16:53)   Authored: Service, Subjective Data, Objective Data, Assessment  and Plan, Note Completion      Last Updated: 25-Sep-2023 16:53 by Maile Chaney (APRN-CNP)

## 2023-09-30 NOTE — PROGRESS NOTES
"      Consult Type: subsequent visit/care     Service: Post ICU Service     Subjective Data:   VASQUEZ PAK is a 53 year old Female who is Hospital Day # 4.     Transferred to Medical SDU from CTICU after EGD this afternoon.    Upon arrival, RRT called for unresponsive episode with hypotension, RR <8, hypoxia and blood sugar of 47.  Placed on 100% NRB, given amp D50, IV narcan and IVF.    Objective Data:     Objective Information:      T   P  R  BP   MAP  SpO2   Value  36.3  95  20  157/44   74  98%  Date/Time 9/25 12:00 9/25 20:00 9/25 20:00 9/24 5:00  9/24 5:00 9/25 20:00  Range  (36.1C - 36.5C )  (90 - 109 )  (6 - 27 )  (128 - 173 )/ (44 - 128 )  (71 - 141 )  (72% - 100% )   As of 25-Sep-2023 17:15:00, patient is on 15 L/min of oxygen via nonrebreather mask.      Pain reported at 9/25 12:00: 4  Pain reported at 9/24 18:00: unable to assess;  pt \"feels better\"    ---- Intake and Output  -----  Mn/Dy/Year Time  Intake   Output  Net  Sep 25, 2023 2:00 pm  340   0  340  Sep 25, 2023 6:00 am  370   0  370  Sep 24, 2023 10:00 pm  430   0  430    The Intake and Output Totals for the last 24 hours are:      Intake   Output  Net      1040   null  null         Weights   9/25 9:24: BMI (kg/m2) (BMI (kg/m2))  19.202    Physical Exam Narrative:  ·  Physical Exam:      Constitutional: cachetic, chronically ill   Eyes: no icterus   ENMT: mucous membranes moist, no apparent injury, no lesions seen  Head/Neck: Neck supple, no apparent injury  Respiratory/Thorax: Lungs CTA bilaterally, non-labored breathing, no cough, on RA  Cardiovascular: Regular, rate and rhythm, 3/6 systolic murmur, normal S1 and S2  Gastrointestinal: Nondistended, soft, non-tender, BS present x 4  Musculoskeletal: ROM intact, no joint swelling, normal strength  Extremities: normal extremities, generalized edema, contusions or wounds  Neurological: alert and oriented x 1-2, GARG   Skin: Warm and dry, RUE AV graft no thrill or bruit, sutures in " place      Medication:    Medications:          Continuous Medications       --------------------------------    1. Dextrose 10% in Water Infusion:  500  mL  IntraVenous  <Continuous>    2. Norepinephrine 8 mg/ D5W 250 mL Infusion:  0.01  mcg/kg/min  IntraVenous  <Continuous>         Scheduled Medications       --------------------------------    1. Albumin 25% IV Bolus:  25  gram(s)  IntraVenous Piggyback  Every 6 Hours    2. Collagenase 250 Units/ gram Topical:  1  application(s)  Topical  Daily    3. Levothyroxine:  175  microgram(s)  Oral  Daily    4. Linezolid 600 mg IVPB/ Premixed Soln 300 mL:  300  mL  IntraVenous Piggyback  Every 12 Hours    5. Melatonin:  3  mg  Oral  Daily 1800    6. Midodrine:  10  mg  Oral  Every 8 Hours    7. Pantoprazole Injectable:  40  mg  IntraVenous Push  Every 12 Hours    8. Piperacillin - Tazobactam 2.25 grams/Iso-osmotic 50 mL Premix IVPB:  50  mL  IntraVenous Piggyback  Every 8 Hours         PRN Medications       --------------------------------    1. Acetaminophen:  650  mg  Oral  Every 4 Hours    2. Albuterol 2.5 mg - Ipratropium 0.5 mg/ 3 mL Neb Soln:  3  mL  Inhalation  4 Times a Day    3. Dextrose 50% in Water Injectable:  25  gram(s)  IntraVenous Push  Every 15 Minutes    4. Dextrose 50% in Water Injectable:  12.5  gram(s)  IntraVenous Push  Every 15 Minutes    5. Glucagon Injectable:  1  mg  IntraMuscular  Every 15 Minutes    6. Ondansetron Injectable:  4  mg  IntraVenous Push  Every 6 Hours           Currently Suspended Medications       --------------------------------    1. oxyCODONE Immediate Release:  5  mg  Oral  Every 4 Hours      Recent Lab Results:    Results:    CBC: 9/25/2023 18:09              \     Hgb     /                              \     5.5 LL    /  WBC  ----------------  Plt               13.8 H    ----------------    67 L            /     Hct     \                              /     17.1 L    \            RBC: 1.87 L    MCV: 91           RFP:  9/25/2023 18:09  NA+        Cl-     BUN  /                         130 L   94 L    35 H /  --------------------------------  Glucose                ---------------------------  252 H    K+     HCO3-   Creat \                         4.6    25    3.72 H \  Calcium : 8.6Anion Gap : 16          Albumin : 2.6 L    Phos : 3.2        ---------- Recent Arterial Blood Gas Results----------     9/25/2023 20:35  pO2 101  24 h range: ( 67 - 212 )  pH 7.36  24 h range: ( 7.36 - 7.37 )  pCO2 42  24 h range: ( 38 - 46 )  SO2 99  24 h range: ( 97 - 99 )  Base Excess -1.8  24 h range: ( -3.6 - 1.2 )null    Radiology Results:    Results:        Impression:    1.  Persistent multifocal bilateral ground-glass opacities that are  similar to mildly improved in appearance compared to prior exam.      Xray Chest 1 View [Sep 25 2023  5:46PM]      Conclusion:  CONCLUSIONS:  1. Left ventricular systolic function is hyperdynamic with a 75-80% estimated ejection fraction.  2. Right ventricular volume and pressure overload.  3. There is reduced right ventricular systolic function.  4. The right ventricle is mildly enlarged.  5. Moderate aortic valve regurgitation.  6. There is moderate to severe aortic valve cusp calcification.  7. Mild mitral valve regurgitation.  8. The mitral valve is moderately thickened. The mitral valve appears to be degenerative.  9. There is severe mitral annular calcification.  10. Severe tricuspid regurgitation visualized.  11. There is significantly restricted tricuspid valve septal leaflet mobility with pseudoprolapse of the anterior leaflet.  12. The tricuspid valve annulus appears dilated.  13. Severely elevated right ventricular systolic pressure.    QUANTITATIVE DATA SUMMARY:  2D MEASUREMENTS:  Normal Ranges:  IVSd:          1.06 cm   (0.6-1.1cm)  LVPWd:         1.19 cm   (0.6-1.1cm)  LVIDd:         3.12 cm   (3.9-5.9cm)  LVIDs:        2.57 cm  LV Mass Index: 75.8 g/m2  LV % FS        17.8 %    LV  SYSTOLIC FUNCTION BY 2D PLANIMETRY (MOD):  Normal Ranges:  EF-A4C View: 77.0 % (>=55%)  EF-A2C View: 81.5 %  EF-Biplane:  79.3 %      RIGHT VENTRICLE:  TAPSE: 17.5 mm  RV s'  0.08m/s    TRICUSPID VALVE/RVSP:  Normal Ranges:  Peak TR Velocity: 5.93 m/s  RV Syst Pressure: 143.7 mmHg (< 30mmHg)  IVC Diam:         1.80 cm      [id number] Richy Emmanuel MD  Electronically signed on 9/25/2023 at 3:42:55 PM        *** Final ***     Echocardiogram [Sep 25 2023  3:43PM]      Assessment and Plan:   Code Status:  ·  Code Status Full Code     Assessment:    TUCKERKUSUMMATHEWS VASQUEZ FERRO is a 53 year old Female with PMH ESRD on HD MWF via RUE AVF, COPD on home O2 6L, HFmrEF 45-50%, pulmonary hemosiderosis, and HTN presented  from [facility] on 9/22/2023 for bleeding RUE AVG. Pt had HD at OSH on 9/22 and had complete session. Towards end of HD session, RUE AVG was bleeding excessively. Graft was ligated on 9/22 and pt transferred to [facility] to have graft removed. On 9/23, pt  was scheduled for OR graft explant but case cancelled as pt was hypoglycemic, hypotensive, and altered so transferred to CTICU on 9/23.    Neuro: Acute Encephalopathy, unknown etiology.  D/DX: infection, renal failure.  Chronic Pain  - OARSS reviewed, Last script for T#3; OD Risk score 160.   - A&Ox1-2, somnolent, ?expressive aphasia  - 9/18: CT Head: no acute hemorrhage or stroke  - previously on Gabapentin which was stopped at [facility] due to encephalopathy   - PRN Tylenol for pain  - Stop narcotics, sedating meds  - TSH, Vit B12, Folate labs ordered    Cardiac: HFpEF, Tricuspid regurg, R sided heart failure, HTN, HLD.  Hypotension-- sepsis vs hemorrhagic shock  - TTE 9/6 EF 45-50%, low normal RV function, mod LVH, LA severely dilated, RVSP 66mmHg  - Repeat Echo 9/25: LVEF 75-80%, RV volume and pressure overload, reduced RV systolic function, RV mildly enlarged, RVSP 143.7, Mod AV regurg, Mod MV regurg, Severe TV regurg, significantly restricted  TV septal leaflet  mobility with pseudo prolapse of the anterior leaflet  - continue Midodrine 10 mg TID  - Albumin x4 doses  - LR given this evening for persistent hypotension, BP/MAP fluid responsive  - A.M. cortisol level pending as pt previously on steroids  - consult cardiology for TV reguug, restricted TV mobility    Pulm: Chronic hypoxic respiratory failure, COPD on 6 L home O2, Pulmonary Hemosiderosis & Hx of Diffuse Alveolar hemorrhage 2/2 inhalation  of cleaning fluid in 2017  - weaned to 4 L NC  - PRN DuoNebs QID  - IS while awake    FEN/GI: Severe Protein Calorie Malnutrition, HypoNa, Grade D Esophagitis  - renal diet when able to eat  - 9/25 EGD: Moderately severe LA Grade D esophagitis with circumferential ulceration and exudate not actively bleeding was  found. Coffee grounds in esophagus.  Scattered moderate inflammation and scattered hematin was found in the stomach. No signs of active bleeding observed. No bleeding lesions. The examined duodenum showed erosive peptic duodenitis in setting of melanosis,  mainly deep erosions but no vessel or pigmented spots.  - BID PPI    Renal: ESRD on HD (MWF), Nonfunctioning RUE AVG  - Last HD 9/22 at [facility]  - plan for RUE AVG extraction and temp HD line in OR 9/26.  May need ICU transfer for HD line if OR delayed again  - RFP daily  - checking Vit D level    Heme: Anemia of Chronic disease with Acute blood loss anemia, thrombocytopenia, Hypercoagulopathy.  recent hx of L axillary and brachial DVT  (started on Apixiban at [facility])  - Several antibodies present, [place] assisting with appropriate cross match.  - previous admit, Anti PF4 Negative on 9/5  - Q6H CBC  - Transfuse 2 units PRBC and 1 unit FFP  - Transfuse uncrossed RBC if becomes unstable with Hgb drop   - hold AC in setting of acute bleeding  - SCD's for DVT prophylaxis    ID: Sepsis (tachycardia, tachypnea, hypotension, thrombocytopenia, leukocytosis with L shift).  Recently treated for Acinetobacter (+BC 9/2,  pan  sens) bacteremia 2/2 sacral wound.  Completed 14 day course of zosyn 9/3-9/5 and cefepime 9/5-9/16.  - possible sources: LUE AV graft with exposed graft material vs sacral wound vs bacteremia  - 9/23 BC x1 NGTD  - 9/25 BC X2 obtained  - checking Procalc, repeat blood cx x2  - s/p 1.5 L LR  - continue Linezolid (9/23- current), Zosyn (9/23-- current)  - ID following, recs appreciated  - HIV, Hep panel ordered  - Bacterial and Fungal cx for intra-op specimens ordered    Endo: No Hx of DM.  Hypoglycemia from infection/possible sepsis  - continue D10 drip at 40 ml/hr  - Q2H blood sugars  - Levothyroxine 175  microgram Oral  Daily     Wounds:  Stage 3-4 sacral pressure ulcer- size: 7cm x 9.5cm x 1.5cm  (POA); LUE AV Graft exposure (POA)  - seen by wound care RN  - Aggressive turning q2 hours side-to-side to offload sacrum. Cleanse and redress sacral wound DAILY using nickel-thick layer of Santyl (collagenase) ointment, then piece of Aquacel Ag cut to fit; secure with Mepilex foam. Optimize nutrition.  - ACS to debride sacral wound in OR on 9/26  - Vasc Surgery to explant LUE AV graft on 9/26  - Santyl to wound bed with dressing changes daily    Lines: L Brachial Burley (9/23), LUE PIV, L foot PIV    Code Status; Full  NOK,  Tae [phone]  [facility] consulted for [facility].  Patient clinically tenuous    Dispo: admit to SDU from CTICU.  Low threshold for ICU transfer.      total time spent 60 minutes, and greater than 50% of  time was spent in counseling/coordination of care     Nutrition Diagnosis:  ·  Nutrition Diagnosis      Agree with dietitian?s assessment and diagnoses as stated.  A new diagnosis of Severe malnutrition related to chronic disease or condition as evidence  by severe muscle wasting, severe subcutaneous fat loss, large sacral wound.        Electronic Signatures:  Eugenia Jacome (APRN-CNP)  (Signed 26-Sep-2023 04:31)   Authored: Service, Subjective Data, Objective Data, Assessment  and Plan,  Note Completion      Last Updated: 26-Sep-2023 04:31 by Eugenia Jacome (APRN-CNP)

## 2023-09-30 NOTE — PROGRESS NOTES
Service: Nephrology     Subjective Data:   VASQUEZ PAK is a 52 year old Female who is Hospital Day # 16.     Awaiting dialysis denies any shortness of breath  Serum calcium downtrending but still elevated is 11.2  Serum sodium 132 magnesium is elevated at 2.64  Hemoglobin is uptrending.    Objective Data:   Physical Exam by System:    Constitutional: Alert oriented  Asthenic habitus   Eyes: Sunken eyes pupils react to light and accommodation   ENMT: mucous membranes moist, no apparent injury,  no lesions seen   Head/Neck: Full range of motion no thyromegaly   Respiratory/Thorax: Bibasilar inspiratory crackles  and scattered expiratory wheezing anteriorly   Cardiovascular: 1/6 sternal border systolic murmur   Gastrointestinal: Active peristalsis no rebound or  guarding.  She had an NG tube from the left nostril   Genitourinary: No CVA tenderness   Musculoskeletal: Decreased muscle tone   Extremities: Edema 1+   Neurological: No tremors no asterixis   Breast: No masses, tenderness, no discharge or discoloration   Lymphatic: No significant lymphadenopathy   Psychological: Appropriate mood and behavior   Skin: Poor turgor     Recent Lab Results:    Results:    CBC: 8/11/2023 04:36              \     Hgb     /                              \     11.8 L    /  WBC  ----------------  Plt               10.8       ----------------    380              /     Hct     \                              /     40.3       \            RBC: 4.18     MCV: 96           CMP: 8/11/2023 04:36  NA+        Cl-     BUN  /                         132 L   85 L    73 H /  --------------------------------  Glucose                ---------------------------  90    K+     HCO3-   Creat \                         4.1    31    5.01 H \           \  T Bili  /                    \  0.7  /  AST  x ---- x ALT        16 x ---- x 7         /  Alk P   \               /  137 H \  Calcium : 11.2 H    Anion Gap : 20     Albumin : 4.2      T Protein : 8.8 H             I have reviewed these laboratory results:    Complete Blood Count  11-Aug-2023 04:36:00      Result Value    White Blood Cell Count  10.8    Nucleated Erythrocyte Count  0.3    Red Blood Cell Count  4.18    HGB  11.8   L   HCT  40.3    MCV  96    MCHC  29.3   L   PLT  380    RDW-CV  18.2   H     Comprehensive Metabolic Panel  11-Aug-2023 04:36:00      Result Value    Glucose, Serum  90    NA  132   L   K  4.1    CL  85   L   Bicarbonate, Serum  31    Anion Gap, Serum  20    BUN  73   H   CREAT  5.01   H   GFR Female  10   A   Calcium, Serum  11.2   H   ALB  4.2    ALKP  137   H   T Pro  8.8   H   T Bili  0.7    Alanine Aminotransferase, Serum  7    Aspartate Transaminase, Serum  16      Magnesium, Serum  11-Aug-2023 04:36:00      Result Value    Magnesium, Serum  2.64   H       Radiology Results:    Results:        Impression:    1. Cardiomegaly with pulmonary vascular congestion and diffuse  interstitial prominence suggesting component of edema as well as  patchy bilateral predominant mid to lower lung zone opacities  redemonstrated overall grossly similar to prior. Continued clinical  correlation and follow-up advised.           MACRO:  None     Xray Chest 1 View [Aug 11 2023 10:03AM]      Assessment and Plan:        Additional Dx:   Abnormal electrocardiogram: Onset Date: 16-Feb-2023, Entered  Date: 16-Feb-2023 08:07   Shortness of breath at rest: Onset Date: 10-Mar-2020, Entered  Date: 10-Mar-2020 09:46   Atypical chest pain: Entered Date: 28-Jan-2020 14:41   Tachycardia: Entered Date: 16-Jan-2020 07:44   Acute respiratory failure with hypoxia: Entered Date: 15-Laz-2020  06:46   Hyperkalemia: Entered Date: 15-Laz-2020 06:46   Fluid overload: Entered Date: 15-Laz-2020 06:46   Respiratory failure: Entered Date: 13-Nov-2019 03:17   Diastolic heart failure secondary to hypertension: Entered  Date: 27-Oct-2019 03:29   Hypertensive crisis: Entered Date: 27-Oct-2019 03:28   Pre-operative  exam: Entered Date: 12-Sep-2019 13:28   Pre-operative exam: Entered Date: 12-Sep-2019 13:28   Hypertensive heart disease without heart failure: Entered Date:  23-Nov-2018 10:26   End-stage renal disease: Entered Date: 23-Nov-2018 10:26   Hypertensive heart disease with heart failure: Entered Date:  23-Nov-2018 10:26   Acute on chronic diastolic congestive heart failure: Entered  Date: 23-Nov-2018 10:26   Idiopathic pulmonary hemosiderosis: Entered Date: 21-Nov-2018  13:11   Troponin level elevated: Entered Date: 19-Nov-2018 12:15   PNA (pneumonia): Entered Date: 17-Nov-2018 18:21   Dependence on renal dialysis: Entered Date: 16-Jun-2018 11:33   Non-ST elevation myocardial infarction (NSTEMI), type 2: Entered  Date: 15-Mayank-2018 15:41   Abnormal EKG: Onset Date: 06-Mar-2018, Entered Date: 06-Mar-2018  11:34   Infection due to Streptococcus pneumoniae: Onset Date: 16-Feb-2018,  Entered Date: 16-Feb-2018 18:40   Hemoptysis: Onset Date: 16-Feb-2018, Entered Date: 16-Feb-2018  18:40   Abnormal chest CT: Onset Date: 16-Feb-2018, Entered Date: 16-Feb-2018  18:39   Acute on chronic respiratory failure: Onset Date: 16-Feb-2018,  Entered Date: 16-Feb-2018 18:39   Chronic respiratory failure: Onset Date: 16-Feb-2018, Entered  Date: 16-Feb-2018 00:02   ESRD (end stage renal disease) on dialysis: Entered Date: 09-Feb-2018  22:25   History of hemoptysis: Entered Date: 08-Feb-2018 17:46   Hypertensive heart disease without heart failure: Entered Date:  31-Jan-2018 21:11   End-stage renal disease: Entered Date: 31-Jan-2018 21:11   Diarrhea: Entered Date: 29-Jan-2018 11:00   Sepsis: Entered Date: 29-Jan-2018 11:00   HCAP (healthcare-associated pneumonia): Entered Date: 29-Jan-2018  10:59   Diffuse pulmonary alveolar hemorrhage: Entered Date: 04-Dec-2017  07:07   Hypertensive heart disease without heart failure: Entered Date:  26-Jul-2017 19:35   Encounter for preadmission testing: Onset Date: 09-Feb-2017,  Entered Date: 13-Feb-2017  08:52   Encounter for other preprocedural examination: Entered Date:  13-Dec-2016 15:54       Medical History:   CHF, acute on chronic: Entered Date: 15-Nov-2019 08:58   ESRD (end stage renal disease) on dialysis: Entered Date: 15-Nov-2019  08:58   Volume overload: Entered Date: 15-Nov-2019 08:58   Current nonadherence to medical treatment: Entered Date: 13-Nov-2019  06:09   CHF (congestive heart failure): Onset Date: 30-Jan-2018, Entered  Date: 30-Jan-2018 15:45   Anemia: Entered Date: 30-Nov-2017 20:07   Pulmonary hemorrhage: Entered Date: 30-Nov-2017 19:49   Hypothyroidism: Entered Date: 26-Jul-2017 01:23   COPD (chronic obstructive pulmonary disease): Entered Date:  26-Jul-2017 01:22   Hypertension: Entered Date: 26-Jul-2017 01:22   ESRD (end stage renal disease) on dialysis: Entered Date: 26-Jul-2017  01:22       Surg History:   Tachycardia: Onset Date: 14-Nov-2019, Entered Date: 14-Nov-2019  09:57   Shortness of breath at rest: Onset Date: 28-Oct-2019, Entered  Date: 28-Oct-2019 10:55   Chest pain: Onset Date: 29-Nov-2017, Entered Date: 29-Nov-2017  10:18   Presence of surgically created primary arteriovenous shunt for hemodialysis : Entered Date: 26-Jul-2017 01:22    Code Status:  ·  Code Status Full Code       Impression 1: End-stage renal disease   Plan for Impression 1: Dialysis today   Impression 2: Hypercalcemia   Plan for Impression 2: Factors are immobility  Secondary hyperparathyroidism of renal origin  Awaiting serum protein electrophoresis to rule out monoclonal gammopathy  We will repeat a low-dose of zoledronic acid   Impression 3: Congestive heart failure   Plan for Impression 3: Continue ultrafiltration adjustment  on dialysis       Electronic Signatures:  Daren Bocanegra)  (Signed 11-Aug-2023 16:31)   Authored: Service, Subjective Data, Objective Data, Assessment  and Plan, Note Completion      Last Updated: 11-Aug-2023 16:31 by Daren Bocanegra)

## 2023-09-30 NOTE — PROGRESS NOTES
Service: Vascular Surgery     Subjective Data:   VASQUEZ PAK is a 53 year old Female who is Hospital Day # 5.     O/N - Patient transferred to SDU, and then this AM, transferred back to MICU. Responding to questions, but very lethargic and sleepy this AM.    Objective Data:     Objective Information:        T   P  R  BP   MAP  SpO2   Value  36.2  91  14         92%  Date/Time 9/26 8:00 9/26 8:00 9/26 8:00      9/26 6:30  Range  (36.1C - 36.5C )  (88 - 103 )  (6 - 35 )      (72% - 100% )   As of 26-Sep-2023 06:15:00, patient is on 6 L/min of oxygen via nasal cannula.      ---- Intake and Output  -----  Mn/Dy/Year Time  Intake   Output  Net  Sep 26, 2023 6:00 am  1170.2   0  1170  Sep 25, 2023 10:00 pm  1190   0  1190  Sep 25, 2023 2:00 pm  340   0  340    The Intake and Output Totals for the last 24 hours are:      Intake   Output  Net      2700   null  null    Physical Exam Narrative:  ·  Physical Exam:    Constitutional: Thin, AA female. Lying in bed.   Eyes: Sclera clear  Respiratory/Thorax: Breathing shallowly on 8L NC, satting in low 90s  Cardiovascular: RRR on moniotor  Gastrointestinal: Soft, nontender  Extremities: RUE with ace wrap overlying RUE AVG site; exposed PTFE graft with ligation sutures, no active bleeding. Palpable RUE radial pulse, warm hand, becoming more edematous towards fingers/palm, up to wrist. Weak R. motor on RUE.  Skin: Large sacral decubitus ulceration with undermining  Fibrinous base with some granulation tissue, beige/grey drainage on packing, no bleeding, no purulence  See extremities      Recent Lab Results:    Results:    CBC: 9/26/2023 06:55              \     Hgb     /                              \     5.4 LL    /  WBC  ----------------  Plt               15.5 H    ----------------    71 L            /     Hct     \                              /     15.5 L    \            RBC: 1.85 L    MCV: 84     Neutrophil %: 86.8      CMP: 9/26/2023 06:55  NA+         Cl-     BUN  /                         130 L   94 L    38 H /  --------------------------------  Glucose                ---------------------------  71 L    K+     HCO3-   Creat \                         4.5    23    3.57 H \           \  T Bili  /                    \  1.5 H /  AST  x ---- x ALT        7 Lx ---- x 4 L         /  Alk P   \               /  150 H \  Calcium : 8.9     Anion Gap : 18     Albumin : 3.0 L    T Protein : 4.8 L           RFP: 9/25/2023 18:09  NA+        Cl-     BUN  /                         130 L   94 L    35 H /  --------------------------------  Glucose                ---------------------------  252 H    K+     HCO3-   Creat \                         4.6    25    3.72 H \  Calcium : 8.6Anion Gap : 16          Albumin : 2.6 L    Phos : 3.2      Coagulation: 9/25/2023 22:17  PT  /                    34.3 H /  -------<    INR          ----------<      3.0 H  PTT\                              \                       ---------- Recent Arterial Blood Gas Results----------     9/26/2023 06:11  pO2 59  24 h range: ( 59 - 212 )  pH 7.35  24 h range: ( 7.35 - 7.37 )  pCO2 44  24 h range: ( 38 - 46 )  SO2 91  24 h range: ( 91 - 99 )  Base Excess -1.2  24 h range: ( -3.6 - 1.2 )null    Radiology Results:    Results:        Conclusion:  Normal sinus rhythm  Rightward axis  Septal infarct (cited on or before 12-SEP-2019)  Abnormal ECG  When compared with ECG of 02-SEP-2023 02:07, (unconfirmed)  ST elevation now present in Anterior leads  Nonspecific T wave abnormality now evident in Lateral leads  Confirmed by Gonzalo Mitchell ([id number]) on 9/24/2023 2:41:35 PM     Electrocardiogram 12 Lead [Sep 24 2023  2:53PM]      Impression:    1.  Worsened diffused ground-glass opacities involving the both  lungs, worsened pulmonary edema/fluid overload.  2. Cardiac silhouette is moderately enlarged. Aorta is tortuous.  Pulmonary vessels are congested.  3. No focal consolidation.  4. No pleural effusion  or pneumothorax seen.  5. Removal of the ET tube, enteric tube, pH probe, and the IJ  catheter.  6. Vascular stent along the right axillary artery.           MACRO:  None     Xray Chest 1 View [Sep 24 2023 10:09AM]      Assessment and Plan:   Daily Risk Screen:  ·  Does patient have a central line? yes   ·  Central Line Type dialysis     Code Status:  ·  Code Status Full Code     Assessment:    53F PMHx COPD, ESRD, HFpEF admitted as transfer from [facility] following episode of bleeding from RUE AVG at dialysis. Exposed graft, large skin defect. Graft ligated  by IR at [facility] and transferred to [facility].    Tentative plan for operative resection of exposed graft 9/23, however patient persistently hypoglycemic with NPO, somnolent, intermittent tachycardia/hypotension likely related to hypoglycemia and therefore OR canceled and patient moved to MICU for closer  monitoring.    After discussion with MICU team, they noted increased concern regarding her TTE - MICU team currently exploring RV strain +/- infective endocarditis; pending cardiology and possibly cardiac surgery involvement.     Plan:  - Plan for OR for resection of exposed RUE AVG and temp HD cath placement, concurrently with ACS for sacral wound debridement -- OR date pending pRBC transfusion from [facility]. Please keep remaining  3U pRBCs from [facility] on hold for OR   - Continue abx until 24hrs post-AVG resection pending negative blood cultures  - Nephrology following for HD needs. Patient currently has no access for HD, however if need arises prior to 9/26, will need temp cath placement in ICU and HD. Needs to undergo dialysis today  - Renal diet  - Remainder of care per ICU team, appreciate care by MICU team  - Please page with questions/concerns    Patient was discussed with attending, Dr. Wu.    Sandi Hong MD, MSc  Vascular Surgery  [place] [id number]      Attestation:   Note Completion:        Electronic Signatures:  Kamaljit Wu (MD)  (Signed  29-Sep-2023 15:55)   Authored: Note Completion   Co-Signer: Service, Subjective Data, Objective Data, Assessment and Plan, Note Completion  Sandi Hong (Resident))  (Signed 26-Sep-2023 09:06)   Authored: Service, Subjective Data, Objective Data, Assessment  and Plan, Note Completion      Last Updated: 29-Sep-2023 15:55 by Kamaljit Wu)

## 2023-09-30 NOTE — PROGRESS NOTES
Service: Nephrology     Subjective Data:   VASQUEZ PAK is a 53 year old Female who is Hospital Day # 5.    Additional Information:    Lethargic this morning     Objective Data:     Objective Information:      T   P  R  BP   MAP  SpO2   Value  36.5  85  13  109/54   70  97%  Date/Time 9/26 12:00 9/26 14:00 9/26 14:00 9/26 12:00  9/26 12:00 9/26 14:00  Range  (36.1C - 36.5C )  (84 - 103 )  (6 - 35 )  (109 - 111 )/ (19 - 54 )  (43 - 70 )  (72% - 100% )   As of 26-Sep-2023 12:00:00, patient is on 8 L/min of oxygen via nasal cannula.      Pain reported at 9/26 12:00: 0  Pain reported at 9/26 4:00: 0    ---- Intake and Output  -----  Mn/Dy/Year Time  Intake   Output  Net  Sep 26, 2023 2:00 pm  1040   0  1040  Sep 26, 2023 6:00 am  1170.2   0  1170  Sep 25, 2023 10:00 pm  1190   0  1190    The Intake and Output Totals for the last 24 hours are:      Intake   Output  Net      2700   null  null    Physical Exam by System:    Constitutional: no acute distress, frail, lethargic   ENMT: nc   Respiratory/Thorax: no labored breathing, CTA B/L,  Supplemental oxygen   Cardiovascular: RRR   Gastrointestinal: soft non-tender   Genitourinary: No solomon   Extremities: no leg edema  RUE AVG - wrapped in dressing   Neurological: alert and oriented x1, follows some  commands, lethargic   Psychological: reduced affect     Medication:    Medications:          Continuous Medications       --------------------------------    1. Dextrose 10% in Water Infusion:  500  mL  IntraVenous  <Continuous>    2. Norepinephrine 8 mg/ D5W 250 mL Infusion:  0.01  mcg/kg/min  IntraVenous  <Continuous>         Scheduled Medications       --------------------------------    1. Collagenase 250 Units/ gram Topical:  1  application(s)  Topical  Daily    2. Hydrocortisone Na Succinate IV Piggy Back:  50  mg  IntraVenous Piggyback  Every 6 Hours    3. Levothyroxine:  175  microgram(s)  Oral  Daily    4. Linezolid 600 mg IVPB/ Premixed Soln 300  mL:  300  mL  IntraVenous Piggyback  Every 12 Hours    5. Melatonin:  3  mg  Oral  Daily 1800    6. Midodrine:  10  mg  Oral  Every 8 Hours    7. Pantoprazole Injectable:  40  mg  IntraVenous Push  Every 12 Hours    8. Phytonadione (Vitamin K) IV Piggy Back:  2  mg  IntraVenous Piggyback  Once    9. Piperacillin - Tazobactam 2.25 grams/Iso-osmotic 50 mL Premix IVPB:  50  mL  IntraVenous Piggyback  Every 8 Hours    10. Thiamine IV Piggy Back:  500  mg  IntraVenous Piggyback  3 Times a Day         PRN Medications       --------------------------------    1. Acetaminophen:  650  mg  Oral  Every 4 Hours    2. Albuterol 2.5 mg - Ipratropium 0.5 mg/ 3 mL Neb Soln:  3  mL  Inhalation  4 Times a Day    3. Dextrose 50% in Water Injectable:  25  gram(s)  IntraVenous Push  Every 15 Minutes    4. Dextrose 50% in Water Injectable:  12.5  gram(s)  IntraVenous Push  Every 15 Minutes    5. Glucagon Injectable:  1  mg  IntraMuscular  Every 15 Minutes    6. Ondansetron Injectable:  4  mg  IntraVenous Push  Every 6 Hours           Currently Suspended Medications       --------------------------------    1. oxyCODONE Immediate Release:  5  mg  Oral  Every 4 Hours      Recent Lab Results:    Results:    CBC: 9/26/2023 06:55              \     Hgb     /                              \     5.4 LL    /  WBC  ----------------  Plt               15.5 H    ----------------    71 L            /     Hct     \                              /     15.5 L    \            RBC: 1.85 L    MCV: 84     Neutrophil %: 86.8      CMP: 9/26/2023 06:55  NA+        Cl-     BUN  /                         130 L   94 L    38 H /  --------------------------------  Glucose                ---------------------------  71 L    K+     HCO3-   Creat \                         4.5    23    3.57 H \           \  T Bili  /                    \  1.5 H /  AST  x ---- x ALT        7 Lx ---- x 4 L         /  Alk P   \               /  150 H \  Calcium : 8.9     Anion Gap  : 18     Albumin : 3.0 L    T Protein : 4.8 L           RFP: 9/25/2023 18:09  NA+        Cl-     BUN  /                         130 L   94 L    35 H /  --------------------------------  Glucose                ---------------------------  252 H    K+     HCO3-   Creat \                         4.6    25    3.72 H \  Calcium : 8.6Anion Gap : 16          Albumin : 2.6 L    Phos : 3.2      Coagulation: 9/26/2023 09:37  PT  /                    30.1 H /  -------<    INR          ----------<      2.6 H  PTT\                    55 H \                       ---------- Recent Arterial Blood Gas Results----------     9/26/2023 06:11  pO2 59  24 h range: ( 59 - 212 )  pH 7.35  24 h range: ( 7.35 - 7.37 )  pCO2 44  24 h range: ( 38 - 44 )  SO2 91  24 h range: ( 91 - 99 )  Base Excess -1.2  24 h range: ( -3.6 - -0.3 )null    Assessment and Plan:   Daily Risk Screen:  ·  Does patient have an indwelling urinary catheter? n/a consulting service   ·  Does patient have a central line? n/a consulting service     Comorbidities:  ·  Comorbidity Other     Code Status:  ·  Code Status Full Code     Assessment:    VASQUEZ PAK PILLO is a 53 year old Female with PMH ESRD on HD MWF via RUE AVF, COPD on home O2 6L, HFmrEF 45-50%, pulmonary hemosiderosis, and HTN presented  from [facility] on 9/22/2023 for bleeding RUE AVG. Pt had HD at OSH on 9/22 and had complete session. Towards end of HD session, RUE AVG was bleeding excessively. Graft was ligated on 9/22 and pt transferred to [facility] to have graft removed. On 9/23, pt  was scheduled for OR graft explant but case cancelled as pt was hypoglycemic, hypotensive, and alterred so transferred to CT ICU on 9/23. Nephrology following for dialysis needs.    Impression  ESRD on HD MWF - previously with RUE AVG - now ligated; last HD was on 9/22 at OSH (completed most of her session)  RUE AVG bleeding - ligated 9/22 at OSH  - Off pressors     -Thus far, blood cultures have been  negative  -Oxygen requirements increased today to 8L via nc   -Low HGB- being transfused today  -ID on board- on Linezolid and Zosyn   -Was planned for OR today with vascular surgery for removal of graft, temp dialysis cath and debridement of sacral ulcer, but deferred given clinical deterioration       Recommendations:  -Temporary dialysis catheter placement today  -HD today as per submitted orders  -Will require placement of tunnelled dialysis catheter once cleared by ID    -Agree with palliative care consult      SW with Dr Christin Yap MD   Nephrology fellow PGY 4     Attestation:   Note Completion:  I am a:  Resident/Fellow   Attending Attestation I saw and evaluated the patient.  I personally obtained the key and critical portions of the history and physical exam or was physically present for key and  critical portions performed by the resident/fellow. I reviewed the resident/fellow?s documentation and discussed the patient with the resident/fellow.  I agree with the resident/fellow?s medical decision making as documented in the note.     I personally evaluated the patient on 26-Sep-2023         Electronic Signatures:  Shilo Yap (Fellow))  (Signed 26-Sep-2023 15:03)   Authored: Service, Subjective Data, Objective Data, Assessment  and Plan, Note Completion  Igor Waller)  (Signed 28-Sep-2023 12:52)   Authored: Assessment and Plan, Note Completion   Co-Signer: Service, Subjective Data, Objective Data, Assessment and Plan, Note Completion      Last Updated: 28-Sep-2023 12:52 by Igor Waller)

## 2023-09-30 NOTE — H&P
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 She then presented to [facility] initially 9/2/23 in acute on chronic hypercapnic respiratory failure/septic shock requiring intubation  and admission to ICU level care. She had only received half sessions of dialysis in the intervening 4 days between admissions, so she needed urgent dialysis on presentation. Of note, once stabilized and transferred to SDU, patient left AMA, made it to  the parking lot and became significantly dyspneic and returned back to the ED. She was given some fluid and was admitted back to SDU for further evaluation and care. Patient was admitted to [facility] on 9/22/2023 as a transfer from [facility] following episode  of bleeding from RUE AVG at dialysis. Exposed graft, large skin defect. Graft ligated by IR at [facility] and transferred to [facility] for further evaluation and management by vascular surgery. Patient was tentatively planned for operative resection of exposed  graft today, however patient was persistently hypoglycemic with NPO, somnolent, intermittent tachycardia/hypotension likely related to hypoglycemia and therefore OR was canceled for today. Patient transferred to SICU for further management.       PMH: HTN, HLD, ESRD (MWF RUE AVG), hypothyroid, HFpEF (~70% LVEF), LUISITO,  COPD (6L NC), aortic regurg, aortic stenosis, hx DAH, bacteremia, multiple AMA discharges, noncompliant with  care, numerous admissions for missed dialysis sessions    PSH: RUE brach-ax graft placed by Dr. Gan 8-10 years ago    Soc hx: 3-4 cigars daily; no drug or alcohol use        Home Medications:    -trazodone 50 mg oral tablet   -torsemide 10mg oral tablet  -melatonin 3 mg oral tablet   -lidocaine topically with dialysis  -levothyroxine 175 mcg  -ipatropropium albuterol 0.5mg  -hydralazine 50 mg oral tablet   -gabapentin 100 mg oral capsule   -epoetin deisi  -docusate sodium 100 mg oral   -collagenase 250 unites/topical ointment   -clonidine 0.3 mg oral tablet   -eliquis 5 mg oral tablet  -amlodipine 10 mg oral tablet               Comorbidities:   Comorbidites:  ·  Comorbid Conditions chronic kidney disease   ·  Chronic Kidney Disease requiring dialysis   ·  CKD Stage End of stage renal disease     Social History:   Social History:  Smoking Status former smoker (1)   Alcohol Use denies(1)   Drug Use denies (1)              Allergies:  ·  vancomycin : Resp Distress, Itching  ·  carvedilol : Unknown  ·  doxazosin : Unknown  ·  benazepril : Unknown    Medications Prior to Admission:   Outpatient Meds have not been reviewed.    Objective:   Objective Information:    ·  Objective Information      T   P  R  BP   MAP  SpO2   Value  36.8  112  14  107/43   76  93%  Date/Time 9/23 13:14 9/23 15:05 9/23 13:14 9/23 15:05  9/23 6:12 9/23 15:05  Range  (36.2C - 36.8C )  (73 - 122 )  (13 - 18 )  (89 - 157 )/ (43 - 62 )  (71 - 86 )  (93% - 100% )   As of 23-Sep-2023 13:14:00, patient is on 5 L/min of oxygen via nasal cannula.      Pain reported at 9/23 7:30: 6 = Moderate      ---- Intake and Output  -----  Mn/Dy/Year Time  Intake   Output  Net  Sep 23, 2023 6:00 am  0   0  0    The Intake and Output Totals for the last 24 hours are:      Intake   Output  Net      null   0  null    Date:            Weight/Scale  Type:  22-Sep-2023 21:00  44.6  kg / bed    Physical Exam by System:    Neurological: Somnolent, intact senses   Cardiovascular: Regular rhythm, tachycardic   Respiratory/Thorax: Patient on 5L NC, good chest  expansion, thorax symmetric   Genitourinary: No Discharge, vesicles or other abnormalities  noted   Gastrointestinal: Soft, nontender   Skin: Warm, dry   Musculoskeletal: ROM intact, no joint swelling   Constitutional: Thin, AA female. Somnolent   Eyes: EOMI, clear sclera   ENMT: mucous membranes moist, no apparent injury,  no lesions seen   Head/Neck: no apparent injury, trachea midline   Extremities: normal extremities, no significant cyanosis  or edema noted   Psychological: Somnolent     Recent Lab Results:    Results:    CBC: 9/23/2023 03:10              \     Hgb     /                              \     7.9 L    /  WBC  ----------------  Plt               4.7       ----------------    67 L            /     Hct     \                              /     26.7 L    \            RBC: 2.74 L    MCV: 97           BMP: 9/22/2023 17:30  NA+        Cl-     BUN  /                         138    102    8  /  --------------------------------  Glucose                ---------------------------  37 LL    K+     HCO3-   Creat \                         4.6  26    1.47 H \  Calcium : 8.5 L    Anion Gap : 15      CMP: 9/23/2023 03:10  NA+        Cl-     BUN  /                         139    100    10  /  --------------------------------  Glucose                ---------------------------  46 LL    K+     HCO3-   Creat \                         5.0    32    1.94 H \           \  T Bili  /                    \  0.8  /  AST  x ---- x ALT        32 x ---- x 10         /  Alk P   \               /  101  \  Calcium : 8.5 L    Anion Gap : 12     Albumin : 2.6 L     T Protein : 5.5 L              ---------- Recent Arterial Blood Gas Results----------     9/23/2023 14:14  pO2 67  pH 7.42  pCO2 49  SO2 95  Base  Excess 6.6null    Assessment and Plan:   Neurology:  Diagnosis:    VASQUEZ PAK is a 53 year old Female with history of COPD/asthma (6L NC), HFpEF (LVEF 69%, severe TR, moderate [place], moderately to severely decreased  RV function, 7/5/23), ESRD (MWF), HTN, HLD, hypothyroidism, pulmonary hemosiderosis, and 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 Patient was admitted on 9/22/2023 as a transfer from [facility] following episode of bleeding  from RUE AVG at dialysis. Exposed graft, large skin defect. Graft ligated by IR at [facility] and transferred to [facility] for further evaluation and management by vascular surgery. Patient was tentatively planned for operative resection of exposed graft today,  however patient was persistently hypoglycemic with NPO, somnolent, intermittent tachycardia/hypotension likely related to hypoglycemia and therefore OR was canceled for today. Patient transferred to SICU for further management.       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  Given fistula dysfunction, patient will need trialysis catheter insertion prior to next hemodialysis  session.    HEME: Patient anemic, hgb at 6.3 on floor  - CBC BID and PRN  - scds for dvt prophylaxis.  - transfuse blood products as appropriate   - Patient with significant antibodies, testing sent to [facility] to allow for obtainment of typed and crossed units.  - Avoid unnecessary lab draws.    ENDO: Hypoglycemia, hypothyroidism   - D10 infusion  - Hourly BG check goal Glu 100-180 mg/dl  - SSI Lispro per ICU protocol  - Levothyroxine 175  microgram(s)  Oral  Daily     ID: Afebrile, WBC 11.7 on floor  - Patient on linezolid and Zosyn empirically   - monitor for s/s infection    Lines: PIV x 1     Dispo: continue ICU care. Reassess dispo in am          Code Status:  ·  Code Status Full Code     Attestation:   Note Completion:  I am a:  Resident/Fellow   Attending Attestation I saw and evaluated the patient.  I personally obtained the key and critical portions of the history and physical exam or was physically present for key and  critical portions performed by the resident/fellow. I reviewed the resident/fellow?s documentation and discussed the patient with the resident/fellow.  I agree with the resident/fellow?s medical decision making as documented in the note.     I personally evaluated the patient on 23-Sep-2023   Comments/ Additional Findings    Staff Note:   This critically ill patient continues to be at risk for failure/deterioration due to the above mentioned unstable organ systems. I have personally identified and managed all critical care issues. Critical care time is spent at the bedside and includes  review of diagnostic tests, labs, radiographic images, serial assessment of hemodynamics, respiratory status, ventilatory management, and family updates.  Time spent in procedures and teaching are reported separately.  Any additions reflect my personal  assessment and input.    Gil Ramsey MD, [facility]  Staff Critical Care Medicine  Department of Anesthesiology and Perioperative Medicine  p. [phone]    Critical  "Care Patient I have reviewed and evaluated the most recent data and results, personally examined the patient, and formulated the plan of care as presented above.  This patient  was critically ill and required continued critical care treatment. Teaching and any separately billable procedures are not included in the time calculation.    Billing Provider Critical Care Time 45 minute(s)   Primary Critical Care Issue/Treatment (See Assessment and Plan for greater detail) -- This patient is, or has been, hemodynamically unstable.  We are treating with appropriate medications, as well as doing intensive diagnostic evaluation and  monitoring. Please see assessment and plan above for greater detail.; -- This patient is believed to have significant acute or end-stage renal failure requiring careful monitoring of fluid and respiratory status, including intensive treatment with appropriate  medications or dialysis, as indicated. Please see assessment and plan above for greater detail.; -- For the nature of the critical condition and treatment, this documentation has been prepared by the attending physician/FAVIAN- billing provider of these  critical care services.         Electronic Signatures:  Gil Ramsey)  (Signed 25-Sep-2023 20:29)   Authored: Assessment and Plan, Note Completion   Co-Signer: Service, History of Present Illness, Comorbidities, Social History, Allergies, Medications Prior to Admission, Objective, Assessment  and Plan, Note Completion  Julia Smith (DO (Resident))  (Signed 23-Sep-2023 19:00)   Authored: Service, History of Present Illness, Comorbidities,  Social History, Allergies, Medications Prior to Admission, Objective, Assessment and Plan, Note Completion      Last Updated: 25-Sep-2023 20:29 by Gil Ramsey)    References:  1.  Data Referenced From \"Patient Profile - Adult v2\" 23-Sep-2023 00:32  2.  Data Referenced From \"History and Physical\" 22-Sep-2023 19:10   "

## 2023-09-30 NOTE — PROGRESS NOTES
Service: Nephrology     Subjective Data:   VASQUEZ PAK is a 52 year old Female who is Hospital Day # 14.     Patient awaiting dialysis lab so far serum protein electrophoresis is pending PTH is elevated at [birth year]1/1984 calcium continues to be elevated at 12.4 patient received some zoledronic acid 2 days ago.  And  currently is being dialyzed a low calcium bath every day.    Objective Data:   Physical Exam by System:    Constitutional: Alert oriented  Asthenic habitus   Eyes: Sunken eyes pupils react to light and accommodation   ENMT: mucous membranes moist, no apparent injury,  no lesions seen   Head/Neck: Full range of motion no thyromegaly   Respiratory/Thorax: Bibasilar inspiratory crackles  and scattered expiratory wheezing anteriorly   Cardiovascular: 1/6 sternal border systolic murmur   Gastrointestinal: Active peristalsis no rebound or  guarding.  She had an NG tube from the left nostril   Genitourinary: No CVA tenderness   Musculoskeletal: Decreased muscle tone   Extremities: Edema 1+   Neurological: No tremors no asterixis   Breast: No masses, tenderness, no discharge or discoloration   Lymphatic: No significant lymphadenopathy   Psychological: Appropriate mood and behavior   Skin: Poor turgor     Recent Lab Results:    Results:        I have reviewed these laboratory results:    Renal Function Panel  08-Aug-2023 04:50:00      Result Value    Glucose, Serum  121   H   NA  136    K  4.0    CL  88   L   Bicarbonate, Serum  36   H   Anion Gap, Serum  16    BUN  45   H   CREAT  4.57   H   GFR Female  11   A   Calcium, Serum  12.4   H   Phosphorus, Serum  6.9   H   ALB  4.7      Protein Electrophoresis, Serum  08-Aug-2023 04:50:00      Result Value    T Pro  9.6   H     Parathormone Intact, Serum  08-Aug-2023 04:50:00      Result Value    Parathormone Intact, Serum  183.0   H     Calcium, Ionized Level  07-Aug-2023 13:41:00      Result Value    Calcium, Ionized Level  1.37   H       Assessment  and Plan:        Additional Dx:   Abnormal electrocardiogram: Onset Date: 16-Feb-2023, Entered  Date: 16-Feb-2023 08:07   Shortness of breath at rest: Onset Date: 10-Mar-2020, Entered  Date: 10-Mar-2020 09:46   Atypical chest pain: Entered Date: 28-Jan-2020 14:41   Tachycardia: Entered Date: 16-Jan-2020 07:44   Acute respiratory failure with hypoxia: Entered Date: 15-Laz-2020  06:46   Hyperkalemia: Entered Date: 15-Laz-2020 06:46   Fluid overload: Entered Date: 15-Laz-2020 06:46   Respiratory failure: Entered Date: 13-Nov-2019 03:17   Diastolic heart failure secondary to hypertension: Entered  Date: 27-Oct-2019 03:29   Hypertensive crisis: Entered Date: 27-Oct-2019 03:28   Pre-operative exam: Entered Date: 12-Sep-2019 13:28   Pre-operative exam: Entered Date: 12-Sep-2019 13:28   Hypertensive heart disease without heart failure: Entered Date:  23-Nov-2018 10:26   End-stage renal disease: Entered Date: 23-Nov-2018 10:26   Hypertensive heart disease with heart failure: Entered Date:  23-Nov-2018 10:26   Acute on chronic diastolic congestive heart failure: Entered  Date: 23-Nov-2018 10:26   Idiopathic pulmonary hemosiderosis: Entered Date: 21-Nov-2018  13:11   Troponin level elevated: Entered Date: 19-Nov-2018 12:15   PNA (pneumonia): Entered Date: 17-Nov-2018 18:21   Dependence on renal dialysis: Entered Date: 16-Jun-2018 11:33   Non-ST elevation myocardial infarction (NSTEMI), type 2: Entered  Date: 15-Mayank-2018 15:41   Abnormal EKG: Onset Date: 06-Mar-2018, Entered Date: 06-Mar-2018  11:34   Infection due to Streptococcus pneumoniae: Onset Date: 16-Feb-2018,  Entered Date: 16-Feb-2018 18:40   Hemoptysis: Onset Date: 16-Feb-2018, Entered Date: 16-Feb-2018  18:40   Abnormal chest CT: Onset Date: 16-Feb-2018, Entered Date: 16-Feb-2018  18:39   Acute on chronic respiratory failure: Onset Date: 16-Feb-2018,  Entered Date: 16-Feb-2018 18:39   Chronic respiratory failure: Onset Date: 16-Feb-2018, Entered  Date: 16-Feb-2018  00:02   ESRD (end stage renal disease) on dialysis: Entered Date: 09-Feb-2018  22:25   History of hemoptysis: Entered Date: 08-Feb-2018 17:46   Hypertensive heart disease without heart failure: Entered Date:  31-Jan-2018 21:11   End-stage renal disease: Entered Date: 31-Jan-2018 21:11   Diarrhea: Entered Date: 29-Jan-2018 11:00   Sepsis: Entered Date: 29-Jan-2018 11:00   HCAP (healthcare-associated pneumonia): Entered Date: 29-Jan-2018  10:59   Diffuse pulmonary alveolar hemorrhage: Entered Date: 04-Dec-2017  07:07   Hypertensive heart disease without heart failure: Entered Date:  26-Jul-2017 19:35   Encounter for preadmission testing: Onset Date: 09-Feb-2017,  Entered Date: 13-Feb-2017 08:52   Encounter for other preprocedural examination: Entered Date:  13-Dec-2016 15:54       Medical History:   CHF, acute on chronic: Entered Date: 15-Nov-2019 08:58   ESRD (end stage renal disease) on dialysis: Entered Date: 15-Nov-2019  08:58   Volume overload: Entered Date: 15-Nov-2019 08:58   Current nonadherence to medical treatment: Entered Date: 13-Nov-2019  06:09   CHF (congestive heart failure): Onset Date: 30-Jan-2018, Entered  Date: 30-Jan-2018 15:45   Anemia: Entered Date: 30-Nov-2017 20:07   Pulmonary hemorrhage: Entered Date: 30-Nov-2017 19:49   Hypothyroidism: Entered Date: 26-Jul-2017 01:23   COPD (chronic obstructive pulmonary disease): Entered Date:  26-Jul-2017 01:22   Hypertension: Entered Date: 26-Jul-2017 01:22   ESRD (end stage renal disease) on dialysis: Entered Date: 26-Jul-2017  01:22       Surg History:   Tachycardia: Onset Date: 14-Nov-2019, Entered Date: 14-Nov-2019  09:57   Shortness of breath at rest: Onset Date: 28-Oct-2019, Entered  Date: 28-Oct-2019 10:55   Chest pain: Onset Date: 29-Nov-2017, Entered Date: 29-Nov-2017  10:18   Presence of surgically created primary arteriovenous shunt for hemodialysis : Entered Date: 26-Jul-2017 01:22    Code Status:  ·  Code Status Full Code       Impression 1:  End-stage renal disease   Plan for Impression 1: Continue current dialysis  prescription   Impression 2: Hypercalcemia   Plan for Impression 2: Trying to rule out malignancy  waiting for serum protein electrophoresis,  May require bone survey and adding Cinacalcet.  I had to repeat a dose of zoledronic acid again if calcium levels do not downtrend by next week   Impression 3: Anemia renal disease   Plan for Impression 3: Continue current dose of DIAZ's  and iron iron doses once a week   Impression 4: Anorexia   Plan for Impression 4: Continue Periactin   Impression 5: Severe protein calorie malnutrition   Plan for Impression 5: Protein supplements       Electronic Signatures:  Daren Bocanegra)  (Signed 09-Aug-2023 11:32)   Authored: Service, Subjective Data, Objective Data, Assessment  and Plan, Note Completion      Last Updated: 09-Aug-2023 11:32 by Daren Bocanegra)

## 2023-09-30 NOTE — PROGRESS NOTES
Service: Nephrology     Subjective Data:   VASQUEZ PAK is a 52 year old Female who is Hospital Day # 24.     No new complaints.    Objective Data:   Physical Exam by System:    Constitutional: Alert   Asthenic habitus   Eyes: pupils react to light and accommodation   ENMT: mucous membranes moist, no apparent injury,  no lesions seen   Head/Neck: Full range of motion no thyromegaly   Respiratory/Thorax: Minimal inspiratory rhonchi   Cardiovascular: 1/6 sternal border systolic murmur  no gallop no thrills or rubs   Gastrointestinal: Active peristalsis no rebound or  guarding.  She had an NG tube from the left nostril   Genitourinary: No CVA tenderness   Musculoskeletal: Decreased muscle tone   Extremities: There is a dark spot in the tip of the  index fingers suggestive of old embolic episode   Neurological: No tremors no asterixis   Breast: No masses, tenderness, no discharge or discoloration   Lymphatic: No significant lymphadenopathy   Psychological: Appropriate mood and behavior   Skin: Poor turgor     Recent Lab Results:    Results:    CBC: 8/19/2023 06:53              \     Hgb     /                              \     13.7       /  WBC  ----------------  Plt               10.4       ----------------    290              /     Hct     \                              /     44.4       \            RBC: 4.64     MCV: 96           RFP: 8/19/2023 06:53  NA+        Cl-     BUN  /                         136    91 L   66 H  /  --------------------------------  Glucose                ---------------------------  91    K+     HCO3-   Creat \                         4.6    29    5.22 H \  Calcium : 11.6 HAnion Gap : 21 H          Albumin : 5.5 H    Phos : 5.5 H        I have reviewed these laboratory results:    Complete Blood Count  19-Aug-2023 06:53:00      Result Value    White Blood Cell Count  10.4    Nucleated Erythrocyte Count  0.0    Red Blood Cell Count  4.64    HGB  13.7    HCT  44.4     MCV  96    MCHC  30.9   L   PLT  290    RDW-CV  19.9   H     Renal Function Panel  19-Aug-2023 06:53:00      Result Value    Glucose, Serum  91    NA  136    K  4.6    CL  91   L   Bicarbonate, Serum  29    Anion Gap, Serum  21   H   BUN  66   H   CREAT  5.22   H   GFR Female  9   A   Calcium, Serum  11.6   H   Phosphorus, Serum  5.5   H   ALB  5.5   H       Assessment and Plan:        Additional Dx:   Abnormal electrocardiogram: Onset Date: 16-Feb-2023, Entered  Date: 16-Feb-2023 08:07   Shortness of breath at rest: Onset Date: 10-Mar-2020, Entered  Date: 10-Mar-2020 09:46   Atypical chest pain: Entered Date: 28-Jan-2020 14:41   Tachycardia: Entered Date: 16-Jan-2020 07:44   Acute respiratory failure with hypoxia: Entered Date: 15-Laz-2020  06:46   Hyperkalemia: Entered Date: 15-Laz-2020 06:46   Fluid overload: Entered Date: 15-Laz-2020 06:46   Respiratory failure: Entered Date: 13-Nov-2019 03:17   Diastolic heart failure secondary to hypertension: Entered  Date: 27-Oct-2019 03:29   Hypertensive crisis: Entered Date: 27-Oct-2019 03:28   Pre-operative exam: Entered Date: 12-Sep-2019 13:28   Pre-operative exam: Entered Date: 12-Sep-2019 13:28   Hypertensive heart disease without heart failure: Entered Date:  23-Nov-2018 10:26   End-stage renal disease: Entered Date: 23-Nov-2018 10:26   Hypertensive heart disease with heart failure: Entered Date:  23-Nov-2018 10:26   Acute on chronic diastolic congestive heart failure: Entered  Date: 23-Nov-2018 10:26   Idiopathic pulmonary hemosiderosis: Entered Date: 21-Nov-2018  13:11   Troponin level elevated: Entered Date: 19-Nov-2018 12:15   PNA (pneumonia): Entered Date: 17-Nov-2018 18:21   Dependence on renal dialysis: Entered Date: 16-Jun-2018 11:33   Non-ST elevation myocardial infarction (NSTEMI), type 2: Entered  Date: 15-Mayank-2018 15:41   Abnormal EKG: Onset Date: 06-Mar-2018, Entered Date: 06-Mar-2018  11:34   Infection due to Streptococcus pneumoniae: Onset Date:  16-Feb-2018,  Entered Date: 16-Feb-2018 18:40   Hemoptysis: Onset Date: 16-Feb-2018, Entered Date: 16-Feb-2018  18:40   Abnormal chest CT: Onset Date: 16-Feb-2018, Entered Date: 16-Feb-2018  18:39   Acute on chronic respiratory failure: Onset Date: 16-Feb-2018,  Entered Date: 16-Feb-2018 18:39   Chronic respiratory failure: Onset Date: 16-Feb-2018, Entered  Date: 16-Feb-2018 00:02   ESRD (end stage renal disease) on dialysis: Entered Date: 09-Feb-2018  22:25   History of hemoptysis: Entered Date: 08-Feb-2018 17:46   Hypertensive heart disease without heart failure: Entered Date:  31-Jan-2018 21:11   End-stage renal disease: Entered Date: 31-Jan-2018 21:11   Diarrhea: Entered Date: 29-Jan-2018 11:00   Sepsis: Entered Date: 29-Jan-2018 11:00   HCAP (healthcare-associated pneumonia): Entered Date: 29-Jan-2018  10:59   Diffuse pulmonary alveolar hemorrhage: Entered Date: 04-Dec-2017  07:07   Hypertensive heart disease without heart failure: Entered Date:  26-Jul-2017 19:35   Encounter for preadmission testing: Onset Date: 09-Feb-2017,  Entered Date: 13-Feb-2017 08:52   Encounter for other preprocedural examination: Entered Date:  13-Dec-2016 15:54       Medical History:   CHF, acute on chronic: Entered Date: 15-Nov-2019 08:58   ESRD (end stage renal disease) on dialysis: Entered Date: 15-Nov-2019  08:58   Volume overload: Entered Date: 15-Nov-2019 08:58   Current nonadherence to medical treatment: Entered Date: 13-Nov-2019  06:09   CHF (congestive heart failure): Onset Date: 30-Jan-2018, Entered  Date: 30-Jan-2018 15:45   Anemia: Entered Date: 30-Nov-2017 20:07   Pulmonary hemorrhage: Entered Date: 30-Nov-2017 19:49   Hypothyroidism: Entered Date: 26-Jul-2017 01:23   COPD (chronic obstructive pulmonary disease): Entered Date:  26-Jul-2017 01:22   Hypertension: Entered Date: 26-Jul-2017 01:22   ESRD (end stage renal disease) on dialysis: Entered Date: 26-Jul-2017  01:22       Surg History:   Tachycardia: Onset Date:  14-Nov-2019, Entered Date: 14-Nov-2019  09:57   Shortness of breath at rest: Onset Date: 28-Oct-2019, Entered  Date: 28-Oct-2019 10:55   Chest pain: Onset Date: 29-Nov-2017, Entered Date: 29-Nov-2017  10:18   Presence of surgically created primary arteriovenous shunt for hemodialysis : Entered Date: 26-Jul-2017 01:22    Code Status:  ·  Code Status Full Code       Impression 1: End-stage renal disease   Plan for Impression 1: Dialysis Monday   Impression 2: Hypercalcemia   Plan for Impression 2: Continue Cinacalcet and zoledronic  acid as needed,  We will review parathyroid hormone levels this week   Impression 3: Anemia renal disease   Plan for Impression 3: Continue to monitor CBC   Impression 4: Severe protein calorie malnutrition   Plan for Impression 4: Continue protein supplements   Impression 5: Anorexia   Plan for Impression 5: Continue cyproheptadine       Electronic Signatures:  Daren Bocanegra)  (Signed 19-Aug-2023 12:51)   Authored: Service, Subjective Data, Objective Data, Assessment  and Plan, Note Completion      Last Updated: 19-Aug-2023 12:51 by Daren Bocanegra)

## 2023-09-30 NOTE — PROGRESS NOTES
Service: Vascular Surgery     Subjective Data:   VASQUEZ PAK is a 53 year old Female who is Hospital Day # 3.    Overnight Events: Acute events in the past 24 hours  include   Additional Information:    Moved to ICU yesterday afternoon due to persistent hypoglycemia, hypotension and tachycardia  BPs improve this morning, did require some phenylephrine overnight but off now. Hgb improved 6.3->8.2 after 1u pRBC. Labs without apparent need for HD  RUE AVG site hemostatic    Objective Data:     Objective Information:        T   P  R  BP   MAP  SpO2   Value  36.8  95  15  157/44   74  100%  Date/Time 9/23 18:50 9/24 9:00 9/24 9:00 9/24 5:00  9/24 5:00 9/24 9:00  Range  (36.5C - 36.8C )  (77 - 122 )  (12 - 27 )  (83 - 173 )/ (23 - 128 )  (36 - 141 )  (85% - 100% )   As of 24-Sep-2023 08:00:00, patient is on 3 L/min of oxygen via nasal cannula.    Physical Exam by System:    Constitutional: Thin, AA female. Lying in bed. Confused.  Saying she is in pain but cannot specify where   Eyes: Sclera clear   Respiratory/Thorax: Breathing comfortably on 5L NC   Cardiovascular: RRR on moniotor   Gastrointestinal: Soft, nontender   Extremities: RUE with ace wrap overlying RUE AVG  site; exposed PTFE graft with ligation sutures, no active bleeding. Palpable RUE radial pulse, warm hand   Skin: Large sacral decubitus ulceration with undermining  Fibrinous base with some granulation tissue, beige/grey drainage on packing, no bleeding, no purulence  See extremities     Medication:    Medications:          Continuous Medications       --------------------------------    1. Dextrose 10% in Water Infusion:  500  mL  IntraVenous  <Continuous>    2. Phenylephrine 10 mg/ NaCL 0.9% 250 mL Infusion:  0.5  mcg/kg/min  IntraVenous  <Continuous>         Scheduled Medications       --------------------------------    1. Levothyroxine:  175  microgram(s)  Oral  Daily    2. Linezolid 600 mg IVPB/ Premixed Soln 300 mL:  300  mL   IntraVenous Piggyback  Every 12 Hours    3. Melatonin:  3  mg  Oral  Daily 1800    4. Piperacillin - Tazobactam 2.25 grams/Iso-osmotic 50 mL Premix IVPB:  50  mL  IntraVenous Piggyback  Every 8 Hours         PRN Medications       --------------------------------    1. Acetaminophen:  650  mg  Oral  Every 4 Hours    2. Albuterol 2.5 mg - Ipratropium 0.5 mg/ 3 mL Neb Soln:  3  mL  Inhalation  4 Times a Day    3. Calcium Gluconate 1 gram/ NaCL 0.67% 50 mL Premix IVPB:  50  mL  IntraVenous Piggyback  Every 6 Hours    4. Calcium Gluconate 2 gram/ NaCL 0.67% 100 mL Premix IVPB:  100  mL  IntraVenous Piggyback  Every 6 Hours    5. Dextrose 50% in Water Injectable:  12.5  gram(s)  IntraVenous Push  Every 15 Minutes    6. Dextrose 50% in Water Injectable:  25  gram(s)  IntraVenous Push  Every 15 Minutes    7. Glucagon Injectable:  1  mg  IntraMuscular  Every 15 Minutes    8. Magnesium Sulfate 2 gram/Sterile Water 50 mL Premix Soln:  2  gram(s)  IntraVenous Piggyback  Every 6 Hours    9. Magnesium Sulfate 4 gram/Sterile Water 100 mL Premix Soln:  4  gram(s)  IntraVenous Piggyback  Every 6 Hours    10. Melatonin:  3  mg  Oral  Once    11. Ondansetron Injectable:  4  mg  IntraVenous Push  Every 6 Hours    12. oxyCODONE Immediate Release:  5  mg  Oral  Every 4 Hours    13. Potassium Chloride 20 mEq/Sterile Water 100 mL Premix IVPB:  20  mEq  IntraVenous Piggyback  Every 6 Hours    14. Sodium Chloride 0.9% Injectable Flush:  10  mL  IntraVenous Flush  Every 8 Hours and as Needed        Recent Lab Results:    Results:        I have reviewed these laboratory results:    Renal Function Panel  Trending View      Result 24-Sep-2023 04:47:00  23-Sep-2023 14:32:00    Glucose, Serum 111   H   120   H       136    K 3.8   3.9    CL 97   L   97   L    Bicarbonate, Serum 30   32    Anion Gap, Serum 13   11    BUN 17   12    CREAT 2.67   H   2.51   H    GFR Female 21   A   22   A    Calcium, Serum 8.5   L   7.9   L    Phosphorus, Serum  3.0   3.2    ALB 2.5   L   2.6   L        Complete Blood Count  Trending View      Result 24-Sep-2023 04:47:00  23-Sep-2023 14:32:00  23-Sep-2023 03:10:00    White Blood Cell Count 17.1   H   11.7   H   4.7    Nucleated Erythrocyte Count 0.1   0.0   0.0    Red Blood Cell Count 2.64   L   2.13   L   2.74   L    HGB 8.2   L   6.3   LL   7.9   L    HCT 24.0   L   21.2   L   26.7   L    MCV 91   100   97    MCHC 34.2   29.7   L   29.6   L       L   121   L   67   L    RDW-CV 16.3   H   18.6   H   18.0   H    Lab Comment:   HGB CALLED TO ARABELLA MURILLO , 09/23/2023 15:57          Magnesium, Serum  Trending View      Result 24-Sep-2023 04:47:00  23-Sep-2023 14:32:00  23-Sep-2023 03:10:00    Magnesium, Serum 1.85   1.86   2.12        Calcium, Ionized Level  24-Sep-2023 04:47:00      Result Value    Calcium, Ionized Level  1.14      Arterial Full Panel  Trending View      Result 23-Sep-2023 23:08:00  23-Sep-2023 14:14:00    pH, Arterial 7.40   7.42    pCO2, Arterial 48   H   49   H    pO2, Arterial 96   H   67   L    PATIENT TEMPERATURE, Arterial 37.0   37.0    SO2, Arterial 100   95    Oxy Hgb, Arterial 96.0   92.8   L    HCT CALCULATED, Arterial 16.0   L   21.0   L    SODIUM, Arterial 135   L   134   L    Potassium- Arterial 3.8   3.8    CALCIUM, IONIZED, Arterial 1.16   1.23    GLUCOSE, Arterial 88   46   LL    LACTATE, Arterial 1.6   0.9    BASE EXCESS-BLOOD, Arterial 4.5   H   6.6   H    BiCarb-Calculated, Arterial 29.7   H   31.8   H    HGB, Arterial 5.4   LL   7.0   L    ANION GAP, Arterial SEE COMMENT SEE COMMENT NOT CALCULATED   5   L    Lab Comment: Called- RB to SHERYL SALAS, 09/23/2023 23:12   GLUNA CALLED TO ARABELLA MURILLO, 09/23/2023 15:00    CL   101        Coagulation Screen  23-Sep-2023 14:32:00      Result Value    Prothrombin Time, Plasma  22.7   H   International Normalized Ratio, Plasma  2.0   H   Activated Partial Thromboplastin Time  33      Lactate, Level  23-Sep-2023 14:32:00      Result Value     Lactate, Level  1.5      Comprehensive Metabolic Panel  23-Sep-2023 03:10:00      Result Value    Lab Comment:  CRIT GLU CALLED RB TO SARA NOGUERA., 09/23/2023 04:50    Glucose, Serum  46   LL   NA  139    K  5.0    CL  100    Bicarbonate, Serum  32    Anion Gap, Serum  12    BUN  10    CREAT  1.94   H   GFR Female  30   A   Calcium, Serum  8.5   L   ALB  2.6   L   ALKP  101    T Pro  5.5   L   T Bili  0.8    Alanine Aminotransferase, Serum  10    Aspartate Transaminase, Serum  32      Basic Metabolic Panel  22-Sep-2023 17:30:00      Result Value    Lab Comment:  GLU CALLED RB TO SHI CALVIN, 09/22/2023 19:04    Glucose, Serum  37   LL   NA  138    K  4.6    CL  102    Bicarbonate, Serum  26    Anion Gap, Serum  15    BUN  8    CREAT  1.47   H   GFR Female  42   A   Calcium, Serum  8.5   L       Assessment and Plan:   Comorbidities:  ·  Comorbidity Other     Code Status:  ·  Code Status Full Code     Assessment:    53F PMHx COPD, ESRD, HFpEF admitted as transfer from [facility] following episode of bleeding from RUE AVG at dialysis. Exposed graft, large skin defect. Graft ligated  by IR at [facility] and transferred to [facility].    Tentative plan for operative resection of exposed graft 9/23, however patient persistently hypoglycemic with NPO, somnolent, intermittent tachycardia/hypotension likely related to hypoglycemia and therefore OR canceled and patient moved to SICU for monitoring    Plan:  - Plan for OR 9/26/23 for resection of exposed RUE AVG and temp HD cath placement  - Continue abx until 24hrs post-AVG resection pending negative blood cultures  - Patient may benefit from wound care RN and plastic surgery consultation for large sacral decubitus ulceration  - Medicine consult service engaged this morning for assessment for medical service transfer out of ICU  - Nephrology following for HD needs. Patient currently has no access for HD, however if need arises prior to 9/26, will need temp cath placement in ICU  and HD. Labs do not suggest urgent need for HD today, however will follow-up nephrology recommendations  - Nutrition eval. Add on pre-albumin.   - Renal diet.    Discussed with Dr. Jazmin Blas MD  PGY5 - Integrated Vascular Surgery  Vascular Team Pager - [phone]  Personal Pager - [phone]  Also available on [employer] [facility]      Attestation:   Note Completion:        Electronic Signatures:  Kamaljit Wu)  (Signed 24-Sep-2023 19:47)   Authored: Assessment and Plan, Note Completion   Co-Signer: Service, Subjective Data, Objective Data, Assessment and Plan, Note Completion  Dmitriy Blas (Resident))  (Signed 24-Sep-2023 10:49)   Authored: Service, Subjective Data, Objective Data, Assessment  and Plan, Note Completion      Last Updated: 24-Sep-2023 19:47 by Kamaljit Wu)

## 2023-09-30 NOTE — PROGRESS NOTES
Service: Nephrology     Subjective Data:   VASQUEZ PAK is a 52 year old Female who is Hospital Day # 7.     Patient is undergoing dialysis.  Labs are pending.    Objective Data:   Physical Exam by System:    Constitutional: Alert oriented  Asthenic habitus   Eyes: Sunken eyes pupils react to light and accommodation   ENMT: mucous membranes moist, no apparent injury,  no lesions seen   Head/Neck: Full range of motion no thyromegaly   Respiratory/Thorax: Bibasilar inspiratory crackles  and scattered expiratory wheezing anteriorly   Cardiovascular: 1/6 sternal border systolic murmur   Gastrointestinal: Active peristalsis no rebound or  guarding.  She had an NG tube from the left nostril   Genitourinary: No CVA tenderness   Musculoskeletal: Decreased muscle tone   Extremities: Edema 1+   Neurological: No tremors no asterixis   Breast: No masses, tenderness, no discharge or discoloration   Lymphatic: No significant lymphadenopathy   Psychological: Appropriate mood and behavior   Skin: Poor turgor     Recent Lab Results:    Results:        I have reviewed these laboratory results:    Vancomycin Level, Random  02-Aug-2023 11:43:00      Result Value    Vancomycin Level, Random  10.1        Radiology Results:    Results:        Impression:    Worsening pulmonary edema, new area of atelectasis and consolidation  in the right upper lobe, left hilar and suprahilar region.        Xray Chest 1 View [Jul 28 2023  9:40AM]      Assessment and Plan:        Additional Dx:   Abnormal electrocardiogram: Onset Date: 16-Feb-2023, Entered  Date: 16-Feb-2023 08:07   Shortness of breath at rest: Onset Date: 10-Mar-2020, Entered  Date: 10-Mar-2020 09:46   Atypical chest pain: Entered Date: 28-Jan-2020 14:41   Tachycardia: Entered Date: 16-Jan-2020 07:44   Acute respiratory failure with hypoxia: Entered Date: 15-Laz-2020  06:46   Hyperkalemia: Entered Date: 15-Laz-2020 06:46   Fluid overload: Entered Date: 15-Laz-2020  06:46   Respiratory failure: Entered Date: 13-Nov-2019 03:17   Diastolic heart failure secondary to hypertension: Entered  Date: 27-Oct-2019 03:29   Hypertensive crisis: Entered Date: 27-Oct-2019 03:28   Pre-operative exam: Entered Date: 12-Sep-2019 13:28   Pre-operative exam: Entered Date: 12-Sep-2019 13:28   Hypertensive heart disease without heart failure: Entered Date:  23-Nov-2018 10:26   End-stage renal disease: Entered Date: 23-Nov-2018 10:26   Hypertensive heart disease with heart failure: Entered Date:  23-Nov-2018 10:26   Acute on chronic diastolic congestive heart failure: Entered  Date: 23-Nov-2018 10:26   Idiopathic pulmonary hemosiderosis: Entered Date: 21-Nov-2018  13:11   Troponin level elevated: Entered Date: 19-Nov-2018 12:15   PNA (pneumonia): Entered Date: 17-Nov-2018 18:21   Dependence on renal dialysis: Entered Date: 16-Jun-2018 11:33   Non-ST elevation myocardial infarction (NSTEMI), type 2: Entered  Date: 15-Mayank-2018 15:41   Abnormal EKG: Onset Date: 06-Mar-2018, Entered Date: 06-Mar-2018  11:34   Infection due to Streptococcus pneumoniae: Onset Date: 16-Feb-2018,  Entered Date: 16-Feb-2018 18:40   Hemoptysis: Onset Date: 16-Feb-2018, Entered Date: 16-Feb-2018  18:40   Abnormal chest CT: Onset Date: 16-Feb-2018, Entered Date: 16-Feb-2018  18:39   Acute on chronic respiratory failure: Onset Date: 16-Feb-2018,  Entered Date: 16-Feb-2018 18:39   Chronic respiratory failure: Onset Date: 16-Feb-2018, Entered  Date: 16-Feb-2018 00:02   ESRD (end stage renal disease) on dialysis: Entered Date: 09-Feb-2018  22:25   History of hemoptysis: Entered Date: 08-Feb-2018 17:46   Hypertensive heart disease without heart failure: Entered Date:  31-Jan-2018 21:11   End-stage renal disease: Entered Date: 31-Jan-2018 21:11   Diarrhea: Entered Date: 29-Jan-2018 11:00   Sepsis: Entered Date: 29-Jan-2018 11:00   HCAP (healthcare-associated pneumonia): Entered Date: 29-Jan-2018  10:59   Diffuse pulmonary alveolar  hemorrhage: Entered Date: 04-Dec-2017  07:07   Hypertensive heart disease without heart failure: Entered Date:  26-Jul-2017 19:35   Encounter for preadmission testing: Onset Date: 09-Feb-2017,  Entered Date: 13-Feb-2017 08:52   Encounter for other preprocedural examination: Entered Date:  13-Dec-2016 15:54       Medical History:   CHF, acute on chronic: Entered Date: 15-Nov-2019 08:58   ESRD (end stage renal disease) on dialysis: Entered Date: 15-Nov-2019  08:58   Volume overload: Entered Date: 15-Nov-2019 08:58   Current nonadherence to medical treatment: Entered Date: 13-Nov-2019  06:09   CHF (congestive heart failure): Onset Date: 30-Jan-2018, Entered  Date: 30-Jan-2018 15:45   Anemia: Entered Date: 30-Nov-2017 20:07   Pulmonary hemorrhage: Entered Date: 30-Nov-2017 19:49   Hypothyroidism: Entered Date: 26-Jul-2017 01:23   COPD (chronic obstructive pulmonary disease): Entered Date:  26-Jul-2017 01:22   Hypertension: Entered Date: 26-Jul-2017 01:22   ESRD (end stage renal disease) on dialysis: Entered Date: 26-Jul-2017  01:22       Surg History:   Tachycardia: Onset Date: 14-Nov-2019, Entered Date: 14-Nov-2019  09:57   Shortness of breath at rest: Onset Date: 28-Oct-2019, Entered  Date: 28-Oct-2019 10:55   Chest pain: Onset Date: 29-Nov-2017, Entered Date: 29-Nov-2017  10:18   Presence of surgically created primary arteriovenous shunt for hemodialysis : Entered Date: 26-Jul-2017 01:22    Code Status:  ·  Code Status Full Code       Impression 1: End-stage renal disease   Plan for Impression 1: Continue current dialysis  prescription   Impression 2: Congestive heart failure   Plan for Impression 2: We will adjust ultrafiltration   Impression 3: Anemia renal disease and chronic illness   Plan for Impression 3: Continue to adjust dose of  DIAZ's   Impression 4: Severe protein calorie malnutrition   Plan for Impression 4: Continue protein supplement   Continue antianorexic's       Electronic Signatures:  Daljit  Daren [email or web address])  (Signed 02-Aug-2023 12:26)   Authored: Service, Subjective Data, Objective Data, Assessment  and Plan, Note Completion      Last Updated: 02-Aug-2023 12:26 by Daren Bocanegra)

## 2023-09-30 NOTE — PROGRESS NOTES
Service: Nephrology     Subjective Data:   VASQUEZ PAK is a 52 year old Female who is Hospital Day # 12.     Patient denies any complaints today her serum calcium is elevated at 13.5.  Patient is due for dialysis today.    Objective Data:   Physical Exam by System:    Constitutional: Alert oriented  Asthenic habitus   Eyes: Sunken eyes pupils react to light and accommodation   ENMT: mucous membranes moist, no apparent injury,  no lesions seen   Head/Neck: Full range of motion no thyromegaly   Respiratory/Thorax: Bibasilar inspiratory crackles  and scattered expiratory wheezing anteriorly   Cardiovascular: 1/6 sternal border systolic murmur   Gastrointestinal: Active peristalsis no rebound or  guarding.  She had an NG tube from the left nostril   Genitourinary: No CVA tenderness   Musculoskeletal: Decreased muscle tone   Extremities: Edema 1+   Neurological: No tremors no asterixis   Breast: No masses, tenderness, no discharge or discoloration   Lymphatic: No significant lymphadenopathy   Psychological: Appropriate mood and behavior   Skin: Poor turgor     Recent Lab Results:    Results:    CBC: 8/7/2023 04:30              \     Hgb     /                              \     11.1 L    /  WBC  ----------------  Plt               12.4 H    ----------------    291              /     Hct     \                              /     37.1       \            RBC: 3.91 L    MCV: 95           BMP: 8/7/2023 04:30  NA+        Cl-     BUN  /                         137    84 L   57 H  /  --------------------------------  Glucose                ---------------------------  83    K+     HCO3-   Creat \                         4.1  37 H   6.90 H  \  Calcium : 13.5 HH    Anion Gap : 20        I have reviewed these laboratory results:    Basic Metabolic Panel  07-Aug-2023 04:30:00      Result Value    Lab Comment:  [facility] CALLED TO PETTY SHEA, 08/07/2023 09:18    Glucose, Serum  83    NA  137    K  4.1    CL   84   L   Bicarbonate, Serum  37   H   Anion Gap, Serum  20    BUN  57   H   CREAT  6.90   H   GFR Female  7   A   Calcium, Serum  13.5   HH     Complete Blood Count  07-Aug-2023 04:30:00      Result Value    White Blood Cell Count  12.4   H   Nucleated Erythrocyte Count  0.0    Red Blood Cell Count  3.91   L   HGB  11.1   L   HCT  37.1    MCV  95    MCHC  29.9   L   PLT  291    RDW-CV  18.1   H     Clost Diff. Toxin, PCR  05-Aug-2023 14:33:00      Result Value    Clostridium Difficile Toxin, PCR  NOT DETECTED    Reference Range: Not Detected This assay detects the presence of the tcdB (toxin B)   gene via DNA amplification, and results should be   interpreted in the context of the patients history   and clinical findings. This test cannot be p    Fluid Source  Stool        Assessment and Plan:        Additional Dx:   Abnormal electrocardiogram: Onset Date: 16-Feb-2023, Entered  Date: 16-Feb-2023 08:07   Shortness of breath at rest: Onset Date: 10-Mar-2020, Entered  Date: 10-Mar-2020 09:46   Atypical chest pain: Entered Date: 28-Jan-2020 14:41   Tachycardia: Entered Date: 16-Jan-2020 07:44   Acute respiratory failure with hypoxia: Entered Date: 15-Laz-2020  06:46   Hyperkalemia: Entered Date: 15-Laz-2020 06:46   Fluid overload: Entered Date: 15-Laz-2020 06:46   Respiratory failure: Entered Date: 13-Nov-2019 03:17   Diastolic heart failure secondary to hypertension: Entered  Date: 27-Oct-2019 03:29   Hypertensive crisis: Entered Date: 27-Oct-2019 03:28   Pre-operative exam: Entered Date: 12-Sep-2019 13:28   Pre-operative exam: Entered Date: 12-Sep-2019 13:28   Hypertensive heart disease without heart failure: Entered Date:  23-Nov-2018 10:26   End-stage renal disease: Entered Date: 23-Nov-2018 10:26   Hypertensive heart disease with heart failure: Entered Date:  23-Nov-2018 10:26   Acute on chronic diastolic congestive heart failure: Entered  Date: 23-Nov-2018 10:26   Idiopathic pulmonary hemosiderosis: Entered Date:  21-Nov-2018  13:11   Troponin level elevated: Entered Date: 19-Nov-2018 12:15   PNA (pneumonia): Entered Date: 17-Nov-2018 18:21   Dependence on renal dialysis: Entered Date: 16-Jun-2018 11:33   Non-ST elevation myocardial infarction (NSTEMI), type 2: Entered  Date: 15-Mayank-2018 15:41   Abnormal EKG: Onset Date: 06-Mar-2018, Entered Date: 06-Mar-2018  11:34   Infection due to Streptococcus pneumoniae: Onset Date: 16-Feb-2018,  Entered Date: 16-Feb-2018 18:40   Hemoptysis: Onset Date: 16-Feb-2018, Entered Date: 16-Feb-2018  18:40   Abnormal chest CT: Onset Date: 16-Feb-2018, Entered Date: 16-Feb-2018  18:39   Acute on chronic respiratory failure: Onset Date: 16-Feb-2018,  Entered Date: 16-Feb-2018 18:39   Chronic respiratory failure: Onset Date: 16-Feb-2018, Entered  Date: 16-Feb-2018 00:02   ESRD (end stage renal disease) on dialysis: Entered Date: 09-Feb-2018  22:25   History of hemoptysis: Entered Date: 08-Feb-2018 17:46   Hypertensive heart disease without heart failure: Entered Date:  31-Jan-2018 21:11   End-stage renal disease: Entered Date: 31-Jan-2018 21:11   Diarrhea: Entered Date: 29-Jan-2018 11:00   Sepsis: Entered Date: 29-Jan-2018 11:00   HCAP (healthcare-associated pneumonia): Entered Date: 29-Jan-2018  10:59   Diffuse pulmonary alveolar hemorrhage: Entered Date: 04-Dec-2017  07:07   Hypertensive heart disease without heart failure: Entered Date:  26-Jul-2017 19:35   Encounter for preadmission testing: Onset Date: 09-Feb-2017,  Entered Date: 13-Feb-2017 08:52   Encounter for other preprocedural examination: Entered Date:  13-Dec-2016 15:54    Code Status:  ·  Code Status Full Code       Impression 1: End-stage renal disease   Plan for Impression 1: Dialysis today   Impression 2: Acute hypercalcemia   Plan for Impression 2: Dialysis today with low calcium  bath  We will start the patient zoledronic acid  IV Solu-Medrol order PTH and serum protein electrophoresis  PTH if elevated will start the patient in  [place]   Impression 3: Anemia renal failure   Plan for Impression 3: Improved dose of epogen is  to be adjusted       Electronic Signatures:  Daren Bocanegra)  (Signed 07-Aug-2023 13:01)   Authored: Service, Subjective Data, Objective Data, Assessment  and Plan, Note Completion      Last Updated: 07-Aug-2023 13:01 by Daren Bocanegra)

## 2023-09-30 NOTE — PROGRESS NOTES
Service: Nephrology     Subjective Data:   VASQUEZ PAK is a 52 year old Female who is Hospital Day # 9.     Undergoing dialysis.  Ultrafiltration adjusted for congestive heart failure.  State appetite is improving  Currently on cyproheptadine.    Objective Data:   Physical Exam by System:    Constitutional: Alert oriented  Asthenic habitus   Eyes: Sunken eyes pupils react to light and accommodation   ENMT: mucous membranes moist, no apparent injury,  no lesions seen   Head/Neck: Full range of motion no thyromegaly   Respiratory/Thorax: Bibasilar inspiratory crackles  and scattered expiratory wheezing anteriorly   Cardiovascular: 1/6 sternal border systolic murmur   Gastrointestinal: Active peristalsis no rebound or  guarding.  She had an NG tube from the left nostril   Genitourinary: No CVA tenderness   Musculoskeletal: Decreased muscle tone   Extremities: Edema 1+   Neurological: No tremors no asterixis   Breast: No masses, tenderness, no discharge or discoloration   Lymphatic: No significant lymphadenopathy   Psychological: Appropriate mood and behavior   Skin: Poor turgor     Recent Lab Results:    Results:    CBC: 8/4/2023 04:13              \     Hgb     /                              \     9.8 L    /  WBC  ----------------  Plt               12.2 H    ----------------    262              /     Hct     \                              /     32.4 L    \            RBC: 3.30 L    MCV: 98           CMP: 8/4/2023 04:13  NA+        Cl-     BUN  /                         135 L   92 L    43 H /  --------------------------------  Glucose                ---------------------------  91    K+     HCO3-   Creat \                         3.9    34 H   4.97 H  \           \  T Bili  /                    \  0.8  /  AST  x ---- x ALT        22 x ---- x 7         /  Alk P   \               /  146 H \  Calcium : 12.2 H    Anion Gap : 13     Albumin : 3.1 L     T Protein : 7.7             I have  reviewed these laboratory results:    Complete Blood Count  04-Aug-2023 04:13:00      Result Value    White Blood Cell Count  12.2   H   Nucleated Erythrocyte Count  0.0    Red Blood Cell Count  3.30   L   HGB  9.8   L   HCT  32.4   L   MCV  98    MCHC  30.2   L   PLT  262    RDW-CV  18.4   H     Comprehensive Metabolic Panel  04-Aug-2023 04:13:00      Result Value    Glucose, Serum  91    NA  135   L   K  3.9    CL  92   L   Bicarbonate, Serum  34   H   Anion Gap, Serum  13    BUN  43   H   CREAT  4.97   H   GFR Female  10   A   Calcium, Serum  12.2   H   ALB  3.1   L   ALKP  146   H   T Pro  7.7    T Bili  0.8    Alanine Aminotransferase, Serum  7    Aspartate Transaminase, Serum  22        Radiology Results:    Results:        Impression:    Diffuse congestion/pulmonary edema, appears slightly worse from  previous study. Recommend continued follow-up.        Xray Chest 1 View [Aug  4 2023  8:59AM]      Assessment and Plan:        Additional Dx:   Abnormal electrocardiogram: Onset Date: 16-Feb-2023, Entered  Date: 16-Feb-2023 08:07   Shortness of breath at rest: Onset Date: 10-Mar-2020, Entered  Date: 10-Mar-2020 09:46   Atypical chest pain: Entered Date: 28-Jan-2020 14:41   Tachycardia: Entered Date: 16-Jan-2020 07:44   Acute respiratory failure with hypoxia: Entered Date: 15-Laz-2020  06:46   Hyperkalemia: Entered Date: 15-Laz-2020 06:46   Fluid overload: Entered Date: 15-Laz-2020 06:46   Respiratory failure: Entered Date: 13-Nov-2019 03:17   Diastolic heart failure secondary to hypertension: Entered  Date: 27-Oct-2019 03:29   Hypertensive crisis: Entered Date: 27-Oct-2019 03:28   Pre-operative exam: Entered Date: 12-Sep-2019 13:28   Pre-operative exam: Entered Date: 12-Sep-2019 13:28   Hypertensive heart disease without heart failure: Entered Date:  23-Nov-2018 10:26   End-stage renal disease: Entered Date: 23-Nov-2018 10:26   Hypertensive heart disease with heart failure: Entered Date:  23-Nov-2018  10:26   Acute on chronic diastolic congestive heart failure: Entered  Date: 23-Nov-2018 10:26   Idiopathic pulmonary hemosiderosis: Entered Date: 21-Nov-2018  13:11   Troponin level elevated: Entered Date: 19-Nov-2018 12:15   PNA (pneumonia): Entered Date: 17-Nov-2018 18:21   Dependence on renal dialysis: Entered Date: 16-Jun-2018 11:33   Non-ST elevation myocardial infarction (NSTEMI), type 2: Entered  Date: 15-Mayank-2018 15:41   Abnormal EKG: Onset Date: 06-Mar-2018, Entered Date: 06-Mar-2018  11:34   Infection due to Streptococcus pneumoniae: Onset Date: 16-Feb-2018,  Entered Date: 16-Feb-2018 18:40   Hemoptysis: Onset Date: 16-Feb-2018, Entered Date: 16-Feb-2018  18:40   Abnormal chest CT: Onset Date: 16-Feb-2018, Entered Date: 16-Feb-2018  18:39   Acute on chronic respiratory failure: Onset Date: 16-Feb-2018,  Entered Date: 16-Feb-2018 18:39   Chronic respiratory failure: Onset Date: 16-Feb-2018, Entered  Date: 16-Feb-2018 00:02   ESRD (end stage renal disease) on dialysis: Entered Date: 09-Feb-2018  22:25   History of hemoptysis: Entered Date: 08-Feb-2018 17:46   Hypertensive heart disease without heart failure: Entered Date:  31-Jan-2018 21:11   End-stage renal disease: Entered Date: 31-Jan-2018 21:11   Diarrhea: Entered Date: 29-Jan-2018 11:00   Sepsis: Entered Date: 29-Jan-2018 11:00   HCAP (healthcare-associated pneumonia): Entered Date: 29-Jan-2018  10:59   Diffuse pulmonary alveolar hemorrhage: Entered Date: 04-Dec-2017  07:07   Hypertensive heart disease without heart failure: Entered Date:  26-Jul-2017 19:35   Encounter for preadmission testing: Onset Date: 09-Feb-2017,  Entered Date: 13-Feb-2017 08:52   Encounter for other preprocedural examination: Entered Date:  13-Dec-2016 15:54       Medical History:   CHF, acute on chronic: Entered Date: 15-Nov-2019 08:58   ESRD (end stage renal disease) on dialysis: Entered Date: 15-Nov-2019  08:58   Volume overload: Entered Date: 15-Nov-2019 08:58   Current  nonadherence to medical treatment: Entered Date: 13-Nov-2019  06:09   CHF (congestive heart failure): Onset Date: 30-Jan-2018, Entered  Date: 30-Jan-2018 15:45   Anemia: Entered Date: 30-Nov-2017 20:07   Pulmonary hemorrhage: Entered Date: 30-Nov-2017 19:49   Hypothyroidism: Entered Date: 26-Jul-2017 01:23   COPD (chronic obstructive pulmonary disease): Entered Date:  26-Jul-2017 01:22   Hypertension: Entered Date: 26-Jul-2017 01:22   ESRD (end stage renal disease) on dialysis: Entered Date: 26-Jul-2017  01:22       Surg History:   Tachycardia: Onset Date: 14-Nov-2019, Entered Date: 14-Nov-2019  09:57   Shortness of breath at rest: Onset Date: 28-Oct-2019, Entered  Date: 28-Oct-2019 10:55   Chest pain: Onset Date: 29-Nov-2017, Entered Date: 29-Nov-2017  10:18   Presence of surgically created primary arteriovenous shunt for hemodialysis : Entered Date: 26-Jul-2017 01:22    Code Status:  ·  Code Status Full Code       Impression 1: End-stage renal disease   Plan for Impression 1: Continue to adjust dialysis  prescription   Impression 2: Congestive heart failure   Plan for Impression 2: Increase UF   Impression 3: Anemia renal disease   Plan for Impression 3: Continue to adjust DIAZ/iron   Impression 4: Anorexia   Plan for Impression 4: With severe protein calorie  malnutrition and sarcopenia  Protein supplements  Continue Periactin       Electronic Signatures:  Daren Bocanegra)  (Signed 04-Aug-2023 12:21)   Authored: Service, Subjective Data, Objective Data, Assessment  and Plan, Note Completion      Last Updated: 04-Aug-2023 12:21 by Daren Bocanegra)

## 2023-09-30 NOTE — PROGRESS NOTES
Service: Medicine     Subjective Data:   VASQUEZ PAK is a 53 year old Female who is Hospital Day # 8 and POD #0 for sacral decubitus ulcer debridement.     Patient was seen and examined at bedside. Admitted to pain control.    Objective Data:     Objective Information:      T   P  R  BP   MAP  SpO2   Value  36.3  123  17  152/74   107  93%  Date/Time 9/29 14:30 9/29 18:00 9/29 4:43 9/29 18:00  9/29 4:43 9/29 4:43  Range  (36.3C - 37.1C )  (81 - 123 )  (16 - 18 )  (134 - 167 )/ (62 - 87 )  (87 - 107 )  (86% - 100% )   As of 29-Sep-2023 04:43:00, patient is on 5 L/min of oxygen via nasal cannula.  Highest temp of 37.1 C was recorded at 9/29 0:34      Pain reported at 9/29 14:30: 0 = None      T   P  R  BP   MAP  SpO2   Value  36.6  88  17  143/72   107  93%  Date/Time 9/29 18:14 9/29 18:14 9/29 4:43 9/29 18:14  9/29 4:43 9/29 4:43  Range  (36.3C - 37.1C )  (81 - 123 )  (16 - 18 )  (134 - 167 )/ (62 - 87 )  (87 - 107 )  (86% - 100% )   As of 29-Sep-2023 04:43:00, patient is on 5 L/min of oxygen via nasal cannula.  Highest temp of 37.1 C was recorded at 9/29 0:34    Physical Exam Narrative:  ·  Physical Exam:    Constitutional: Cachectic. A&Ox2  HEENT: dry oral mucosa. Dobhoff in place   CV: RRR, Grade 3 systolic murmur  PULM: CTAB, wheezing bilaterally, on 4L NC  ABDOMEN: Soft, NT/ND. No TTP. + BSx4.  SKIN: Normal Color, Warm, Dry, No Rashes, sacral pressure ulcer  EXTREMITIES: Non-Tender, Full ROM, No Pedal Edema, RUE with bandage  NEURO: A&O x 1-2, nonfocal neurological exam.          Medication:    Medications:          Continuous Medications       --------------------------------  No continuous medications are active       Scheduled Medications       --------------------------------    1. Collagenase 250 Units/ gram Topical:  1  application(s)  Topical  Daily    2. DAPTOmycin IV Piggy Back:  350  mg  IntraVenous Piggyback  Every 48 Hours    3. Ergocalciferol (Vitamin D2) Oral Liquid:  8000   International Unit(s)  Oral  Daily    4. Lidocaine 4% TransDermal:  1  patch  TransDermal  Every 24 Hours    5. Melatonin:  3  mg  Oral  Daily 1800    6. Pantoprazole Injectable:  40  mg  IntraVenous Push  Every 12 Hours    7. Piperacillin - Tazobactam 2.25 grams/Iso-osmotic 50 mL Premix IVPB:  50  mL  IntraVenous Piggyback  Every 8 Hours    8. QUEtiapine:  25  mg  Oral  At Bedtime         PRN Medications       --------------------------------    1. HYDROmorphone Injectable:  0.4  mg  IntraVenous Push  Every 4 Hours    2. Ondansetron Injectable:  4  mg  IntraVenous Push  Every 6 Hours          Recent Lab Results:    Results:    CBC: 9/29/2023 06:02              \     Hgb     /                              \     7.9 L    /  WBC  ----------------  Plt               16.0 H    ----------------    48 L            /     Hct     \                              /     24.9 L    \            RBC: 2.66 L    MCV: 94           RFP: 9/29/2023 06:02  NA+        Cl-     BUN  /                         140    103    21  /  --------------------------------  Glucose                ---------------------------  78    K+     HCO3-   Creat \                         4.5    29    1.99 H \  Calcium : 9.5Anion Gap : 13          Albumin : 3.0 L    Phos : 2.1 L      Radiology Results:    Results:        Impression:    1.  Enteric tube unchanged in position. Nonobstructive bowel-gas  pattern.      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 Pt had HD at OSH on 9/22 and had complete session. Towards end of HD session, RUE AVG was bleeding excessively. Graft was ligated on 9/22 and pt transferred to [facility] to have graft  removed. On 9/23, pt  was scheduled for OR graft explant but case cancelled as pt was hypoglycemic, hypotensive, and altered so transferred to [facility]ICU on 9/23    PLAN:  1. Acute delirium in the setting of prolonged hospitalization, narcotics     1. continue to monitor      2. seroquel at bedtime as need it for sleep regulation      3. continue enteral feeds     2. Right upper AVG bleeding s/p explant and sacral decubitus ulcer debridment      1. tolerated procedure       2. Vascular is requesting to continue antibiotics for 1-2 weeks       3. will follow up OR cultures       4. Patient is on dilaudid for pain control     3.. HFpEF/Tricuspid regurg/R sided heart failure      1. Appreciate cards recommendation-will follow up outpatient     4. Thrombocytopenia-multifactorial       1. stable        2. continue to monitor     5. HTN/HLD/Hypotension-- sepsis vs hemorrhagic shock        1. resolved     6. Chronic hypoxic respiratory failure/COPD on 6 L home O2      1. continue duonebs every 4 hours as need it for wheezing     7. Severe Protein Calorie Malnutrition/Grade D Esophagitis      1. 9/25 EGD: Moderately severe LA Grade D esophagitis with circumferential ulceration and exudate not actively bleeding was found. Coffee grounds in esophagus.  Scattered moderate inflammation and scattered hematin was found in the stomach. No signs  of active bleeding observed. No bleeding lesions. The examined duodenum showed erosive peptic duodenitis in setting of melanosis, mainly deep erosions but no vessel or pigmented spots.    8. ESRD on HD (MWF)      1. Renal following     9.  Hypoglycemia       1. resolved       Disposition: continue to monitor. wean her off oxygen.  Patient will go to SNF when clear by the surgical services. anticipated dc > 2 days     DOC HALO GEORGETTE MASSEY          Electronic Signatures:  Georgette Massey (DO)  (Signed 29-Sep-2023 19:39)   Authored: Service, Subjective Data, Objective Data, Assessment  and  Plan, Note Completion      Last Updated: 29-Sep-2023 19:39 by Georgette Mcgrath (DO)

## 2023-09-30 NOTE — PROGRESS NOTES
Service: Vascular Surgery     Subjective Data:   VASQUEZ PAK is a 53 year old Female who is Hospital Day # 7.     NAOE. Patient received dialysis yesterday. Patient continues to be lethargic and intermittently confused A&O x 1.    Objective Data:     Objective Information:      T   P  R  BP   MAP  SpO2   Value  37  95  16  166/74   105  99%  Date/Time 9/28 8:45 9/27 23:59 9/27 23:59 9/28 8:45  9/28 8:45 9/27 23:59  Range  (36.2C - 37C )  (84 - 113 )  (11 - 29 )  (151 - 173 )/ (50 - 74 )  (90 - 105 )  (90% - 100% )   As of 28-Sep-2023 08:24:00, patient is on 5 L/min of oxygen via nasal cannula.  Highest temp of 37 C was recorded at 9/28 8:45      Pain reported at 9/27 16:00: 0  Pain reported at 9/28 11:00: 8 = Severe    ---- Intake and Output  -----  Mn/Dy/Year Time  Intake   Output  Net  Sep 28, 2023 6:00 am  75   0  75  Sep 27, 2023 10:00 pm  355   2002  -1647  Sep 27, 2023 2:00 pm  280   1  279    The Intake and Output Totals for the last 24 hours are:      Intake   Output  Net      710   2003  -1293    Physical Exam Narrative:  ·  Physical Exam:    Constitutional: Cachectic AA female. Lying in bed.   Eyes: Sclera clear. Eyes moving out of sync with R. eye persistently abducted  Respiratory/Thorax: Breathing shallowly on 5L NC, satting in 90s  Cardiovascular: RRR on monitor  Gastrointestinal: Soft, nontender  Extremities: RUE with ace wrap overlying RUE AVG site; exposed PTFE graft with ligation sutures, no active bleeding. Palpable RUE radial pulse, warm hand, worsening edema towards fingers/palm, up to wrist. Weak R. motor on RUE.  Skin: Large sacral decubitus ulceration with undermining  Fibrinous base with some granulation tissue, beige/grey drainage on packing, no bleeding, no purulence  See extremities      Medication:    Medications:          Continuous Medications       --------------------------------  No continuous medications are active       Scheduled Medications        --------------------------------    1. Collagenase 250 Units/ gram Topical:  1  application(s)  Topical  Daily    2. DAPTOmycin IV Piggy Back:  350  mg  IntraVenous Piggyback  Every 48 Hours    3. Ergocalciferol (Vitamin D2) Oral Liquid:  8000  International Unit(s)  Oral  Daily    4. Levothyroxine Injectable:  87.5  microgram(s)  IntraVenous Push  Daily    5. Lidocaine 4% TransDermal:  1  patch  TransDermal  Every 24 Hours    6. Melatonin:  3  mg  Oral  Daily 1800    7. Pantoprazole Injectable:  40  mg  IntraVenous Push  Every 12 Hours    8. Piperacillin - Tazobactam 2.25 grams/Iso-osmotic 50 mL Premix IVPB:  50  mL  IntraVenous Piggyback  Every 8 Hours    9. Thiamine IV Piggy Back:  500  mg  IntraVenous Piggyback  3 Times a Day         PRN Medications       --------------------------------    1. Acetaminophen:  650  mg  Oral  Every 4 Hours    2. Albuterol 2.5 mg - Ipratropium 0.5 mg/ 3 mL Neb Soln:  3  mL  Inhalation  4 Times a Day    3. Dextrose 50% in Water Injectable:  25  gram(s)  IntraVenous Push  Every 15 Minutes    4. Dextrose 50% in Water Injectable:  12.5  gram(s)  IntraVenous Push  Every 15 Minutes    5. Glucagon Injectable:  1  mg  IntraMuscular  Every 15 Minutes    6. HYDROmorphone Injectable:  0.4  mg  IntraVenous Push  Every 4 Hours    7. Ondansetron Injectable:  4  mg  IntraVenous Push  Every 6 Hours           Currently Suspended Medications       --------------------------------    1. oxyCODONE Immediate Release:  5  mg  Oral  Every 4 Hours      Recent Lab Results:    Results:    CBC: 9/28/2023 05:08              \     Hgb     /                              \     8.5 L    /  WBC  ----------------  Plt               11.4 H    ----------------    59 L            /     Hct     \                              /     26.4 L    \            RBC: 2.90 L    MCV: 91           RFP: 9/28/2023 05:08  NA+        Cl-     BUN  /                         140    100    9  /  --------------------------------   Glucose                ---------------------------  80    K+     HCO3-   Creat \                         3.4 L   28    1.27 H  \  Calcium : 9.5Anion Gap : 15          Albumin : 3.2 L    Phos : 2.6      Radiology Results:    Results:        Impression:  Xray Abdomen AP View [Sep 28 2023  1:16PM]      Assessment and Plan:   Daily Risk Screen:  ·  Does patient have an indwelling urinary catheter? n/a consulting service   ·  Does patient have a central line? n/a consulting service     Code Status:  ·  Code Status DNAR with Added Limitations     Assessment:    53F PMHx COPD, ESRD, HFpEF admitted as transfer from [facility] following episode of bleeding from RUE AVG at dialysis. Exposed graft, large skin defect. Graft ligated  by IR at [facility] and transferred to [facility].    Tentative plan for operative resection of exposed graft 9/29.    Plan:  - Plan for OR for resection of exposed RUE AVG and temp HD cath placement, concurrently with ACS for sacral wound debridement -- OR date 9/29 with stabilized of Hgb and platelet count.   Please keep remaining 2U pRBCs from [place] on hold for OR   -NPO at midnight   - Continue abx until 24hrs post-AVG resection pending negative blood cultures -- continue daptomycin in the setting of thrombocytopenia with linezolid. If thrombocytopenia persists, recommend ID consult for alternative abx as zosyn is also associated  with thrombocytopenia  - Nephrology following for HD needs.   - Renal diet as tolerated; please continue to monitor BG levels as patient is getting hypoglycemic this AM  - Remainder of care per primary team.   - Please page with questions/concerns    Patient was discussed with attending, Dr. Wu.    Mago Medellin MD, PGY1  Vascular Surgery  [place] [id number]    Attestation:   Note Completion:        Electronic Signatures:  Mago Medellin (MD (Resident))  (Signed 28-Sep-2023 14:50)   Authored: Service, Subjective Data, Objective Data, Assessment  and Plan, Note  Completion  Kamaljit Wu)  (Signed 29-Sep-2023 16:01)   Authored: Note Completion   Co-Signer: Subjective Data, Objective Data, Assessment and Plan, Note Completion      Last Updated: 29-Sep-2023 16:01 by Kamaljit Wu)

## 2023-09-30 NOTE — PROGRESS NOTES
Service: Infectious Disease     Subjective Data:   VASQUEZ PAK is a 53 year old Female who is Hospital Day # 5.    Additional Information:    Patient transferred to MICU overnight. Underwent EGD by GI and found to have esophagitis and gastritis but no bleeding ulcer. Now on BID PPI and getting blood. Unclear  what surgical plan for today is, if patient stable for OR given anemia. Patient not on any pressors. Patient lethargic this morning.    Objective Data:     Objective Information:      T   P  R  BP   MAP  SpO2   Value  36.5  89  17  111/19   43  100%  Date/Time 9/26 12:00 9/26 11:00 9/26 11:00 9/26 11:00  9/26 11:00 9/26 11:00  Range  (36.1C - 36.5C )  (88 - 103 )  (6 - 35 )  (111 - 111 )/ (19 - 19 )  (43 - 43 )  (72% - 100% )   As of 26-Sep-2023 08:00:00, patient is on 6 L/min of oxygen via nasal cannula.      Pain reported at 9/26 8:00: 0  Pain reported at 9/26 4:00: 0    ---- Intake and Output  -----  Mn/Dy/Year Time  Intake   Output  Net  Sep 26, 2023 6:00 am  1170.2   0  1170  Sep 25, 2023 10:00 pm  1190   0  1190  Sep 25, 2023 2:00 pm  340   0  340    The Intake and Output Totals for the last 24 hours are:      Intake   Output  Net      2700   null  null    Physical Exam Narrative:  ·  Physical Exam:    Constitutional: thin adult female, lying in bed, sleeping  Eyes: NC/AT, dry mucous membranes, poor dentition  Respiratory/Thorax:  on 8L NC, satting in mid 80s  Cardiovascular: RRR, no murmur  Gastrointestinal: Soft, nontender  Extremities: RUE with ace wrap overlying RUE AVG site  Skin: Large sacral decubitus ulceration with gray colored base with some granulation tissue, beige discharge on packing, no bleeding, no purulence    Medication:    Medications:          Continuous Medications       --------------------------------    1. Dextrose 10% in Water Infusion:  500  mL  IntraVenous  <Continuous>    2. Norepinephrine 8 mg/ D5W 250 mL Infusion:  0.01  mcg/kg/min  IntraVenous   <Continuous>         Scheduled Medications       --------------------------------    1. Collagenase 250 Units/ gram Topical:  1  application(s)  Topical  Daily    2. Hydrocortisone Na Succinate IV Piggy Back:  50  mg  IntraVenous Piggyback  Every 6 Hours    3. Levothyroxine:  175  microgram(s)  Oral  Daily    4. Linezolid 600 mg IVPB/ Premixed Soln 300 mL:  300  mL  IntraVenous Piggyback  Every 12 Hours    5. Melatonin:  3  mg  Oral  Daily 1800    6. Midodrine:  10  mg  Oral  Every 8 Hours    7. Pantoprazole Injectable:  40  mg  IntraVenous Push  Every 12 Hours    8. Piperacillin - Tazobactam 2.25 grams/Iso-osmotic 50 mL Premix IVPB:  50  mL  IntraVenous Piggyback  Every 8 Hours    9. Thiamine IV Piggy Back:  500  mg  IntraVenous Piggyback  3 Times a Day         PRN Medications       --------------------------------    1. Acetaminophen:  650  mg  Oral  Every 4 Hours    2. Albuterol 2.5 mg - Ipratropium 0.5 mg/ 3 mL Neb Soln:  3  mL  Inhalation  4 Times a Day    3. Dextrose 50% in Water Injectable:  25  gram(s)  IntraVenous Push  Every 15 Minutes    4. Dextrose 50% in Water Injectable:  12.5  gram(s)  IntraVenous Push  Every 15 Minutes    5. Glucagon Injectable:  1  mg  IntraMuscular  Every 15 Minutes    6. Ondansetron Injectable:  4  mg  IntraVenous Push  Every 6 Hours           Currently Suspended Medications       --------------------------------    1. oxyCODONE Immediate Release:  5  mg  Oral  Every 4 Hours      Recent Lab Results:    Results:    CBC: 9/26/2023 06:55              \     Hgb     /                              \     5.4 LL    /  WBC  ----------------  Plt               15.5 H    ----------------    71 L            /     Hct     \                              /     15.5 L    \            RBC: 1.85 L    MCV: 84     Neutrophil %: 86.8      CMP: 9/26/2023 06:55  NA+        Cl-     BUN  /                         130 L   94 L    38 H /  --------------------------------  Glucose                 ---------------------------  71 L    K+     HCO3-   Creat \                         4.5    23    3.57 H \           \  T Bili  /                    \  1.5 H /  AST  x ---- x ALT        7 Lx ---- x 4 L         /  Alk P   \               /  150 H \  Calcium : 8.9     Anion Gap : 18     Albumin : 3.0 L    T Protein : 4.8 L           RFP: 9/25/2023 18:09  NA+        Cl-     BUN  /                         130 L   94 L    35 H /  --------------------------------  Glucose                ---------------------------  252 H    K+     HCO3-   Creat \                         4.6    25    3.72 H \  Calcium : 8.6Anion Gap : 16          Albumin : 2.6 L    Phos : 3.2      Coagulation: 9/26/2023 09:37  PT  /                    30.1 H /  -------<    INR          ----------<      2.6 H  PTT\                    55 H \                       ---------- Recent Arterial Blood Gas Results----------     9/26/2023 06:11  pO2 59  24 h range: ( 59 - 212 )  pH 7.35  24 h range: ( 7.35 - 7.37 )  pCO2 44  24 h range: ( 38 - 44 )  SO2 91  24 h range: ( 91 - 99 )  Base Excess -1.2  24 h range: ( -3.6 - -0.3 )null    Assessment and Plan:   Daily Risk Screen:  ·  Does patient have an indwelling urinary catheter? n/a consulting service   ·  Does patient have a central line? 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 Who is admitted to  CTICU for RUE AVF exposed graft c/b bleeding 9/22/23 with current [facility] hospital stay c/b hypoglycemia, anemia with autoantibodies , hypotension.   ID consulted for c/f AVG infection and chronic sacral decubitus wound, patient with vancomycin allergy.     #RUE AVG  with exposed graft material  -must be assumed to be infected  ::Occurred 9/22 while at dialysis  s/p sutures by IR and control of bleeding   -Vascular surgery on board and plan for OR 9/26/23 with ACS. Unclear timing of surgery due to anemia  requiring blood transfusion  -currently on linezolid (vancomycin allergy) and pip-tazo  -blood culture 9/23 NGTD    #Sacral decubitus wound  -ACS following, plan for debridement on 9/26    #History of Acinetobacter bacteremia (treatment course completed) and staph lugdenensis bacteremia in July    Recommendations  -Continue pip-tazo  -Stop linezolid. Patient with symptomatic anemia and thrombocytopenia. Will recommend daptomycin 8mg/kg every 48 hours after dialysis to avoid bone marrow suppression associated with linezolid use. Please obtain baseline CPK and monitor weekly while on  daptomycin.  -Please send intra-op specimens for bacterial and fungal cultures  -ID will continue to follow  -will de-escalate antibiotics based on degree of surgical source control and OR cultures    Patient discussed with Dr. Pina Hancock, PGY-3 Internal Medicine  ID Team A [id number]  New consults [id number]    Attestation:   Note Completion:  I am a:  Resident/Fellow   Attending Attestation I saw and evaluated the patient.  I personally obtained the key and critical portions of the history and physical exam or was physically present for key and  critical portions performed by the resident/fellow. I reviewed the resident/fellow?s documentation and discussed the patient with the resident/fellow.  I agree with the resident/fellow?s medical decision making as documented in the note.     I personally evaluated the patient on 26-Sep-2023         Electronic Signatures:  Mich Heller)  (Signed 26-Sep-2023 20:59)   Authored: Note Completion   Co-Signer: Service, Subjective Data, Objective Data, Assessment and Plan, Note Completion  Jessenia Hancock (Resident))  (Signed 26-Sep-2023 16:11)   Authored: Service, Subjective Data, Objective Data, Assessment  and Plan, Note Completion      Last  Updated: 26-Sep-2023 20:59 by Mich Heller)

## 2023-09-30 NOTE — PROGRESS NOTES
Service: Nephrology     Subjective Data:   VASQUEZ PAK is a 52 year old Female who is Hospital Day # 6.     No new complaints.  Had dialysis yesterday.    Objective Data:   Physical Exam by System:    Constitutional: Alert oriented  Asthenic habitus   Eyes: Sunken eyes pupils react to light and accommodation   ENMT: mucous membranes moist, no apparent injury,  no lesions seen   Head/Neck: Full range of motion no thyromegaly   Respiratory/Thorax: Bibasilar inspiratory crackles  and scattered expiratory wheezing anteriorly   Cardiovascular: 1/6 sternal border systolic murmur   Gastrointestinal: Active peristalsis no rebound or  guarding.  She had an NG tube from the left nostril   Genitourinary: No CVA tenderness   Musculoskeletal: Decreased muscle tone   Extremities: Edema 1+   Neurological: No tremors no asterixis   Breast: No masses, tenderness, no discharge or discoloration   Lymphatic: No significant lymphadenopathy   Psychological: Appropriate mood and behavior   Skin: Poor turgor     Recent Lab Results:    Results:    CBC: 7/31/2023 18:40              \     Hgb     /                              \     8.0 L    /  WBC  ----------------  Plt               11.2       ----------------    227              /     Hct     \                              /     27.6 L    \            RBC: 2.82 L    MCV: 98           CMP: 7/31/2023 18:40  NA+        Cl-     BUN  /                         133 L   93 L    50 H /  --------------------------------  Glucose                ---------------------------  66 L    K+     HCO3-   Creat \                         4.6    30    5.90 H \           \  T Bili  /                    \  0.7  /  AST  x ---- x ALT        16 x ---- x 6 L         /  Alk P   \               /  123 H \  Calcium : 9.9     Anion Gap : 15     Albumin : 3.0 L    T Protein : 7.5             I have reviewed these laboratory results:    Complete Blood Count  31-Jul-2023 18:40:00      Result  Value    White Blood Cell Count  11.2    Nucleated Erythrocyte Count  0.0    Red Blood Cell Count  2.82   L   HGB  8.0   L   HCT  27.6   L   MCV  98    MCHC  29.0   L   PLT  227    RDW-CV  17.8   H     Comprehensive Metabolic Panel  31-Jul-2023 18:40:00      Result Value    Glucose, Serum  66   L   NA  133   L   K  4.6    CL  93   L   Bicarbonate, Serum  30    Anion Gap, Serum  15    BUN  50   H   CREAT  5.90   H   GFR Female  8   A   Calcium, Serum  9.9    ALB  3.0   L   ALKP  123   H   T Pro  7.5    T Bili  0.7    Alanine Aminotransferase, Serum  6   L   Aspartate Transaminase, Serum  16        Radiology Results:    Results:    Xray Chest 1 View [Jul 28 2023  9:40AM]      Assessment and Plan:        Additional Dx:   Abnormal electrocardiogram: Onset Date: 16-Feb-2023, Entered  Date: 16-Feb-2023 08:07   Shortness of breath at rest: Onset Date: 10-Mar-2020, Entered  Date: 10-Mar-2020 09:46   Atypical chest pain: Entered Date: 28-Jan-2020 14:41   Tachycardia: Entered Date: 16-Jan-2020 07:44   Acute respiratory failure with hypoxia: Entered Date: 15-Laz-2020  06:46   Hyperkalemia: Entered Date: 15-Laz-2020 06:46   Fluid overload: Entered Date: 15-Laz-2020 06:46   Respiratory failure: Entered Date: 13-Nov-2019 03:17   Diastolic heart failure secondary to hypertension: Entered  Date: 27-Oct-2019 03:29   Hypertensive crisis: Entered Date: 27-Oct-2019 03:28   Pre-operative exam: Entered Date: 12-Sep-2019 13:28   Pre-operative exam: Entered Date: 12-Sep-2019 13:28   Hypertensive heart disease without heart failure: Entered Date:  23-Nov-2018 10:26   End-stage renal disease: Entered Date: 23-Nov-2018 10:26   Hypertensive heart disease with heart failure: Entered Date:  23-Nov-2018 10:26   Acute on chronic diastolic congestive heart failure: Entered  Date: 23-Nov-2018 10:26   Idiopathic pulmonary hemosiderosis: Entered Date: 21-Nov-2018  13:11   Troponin level elevated: Entered Date: 19-Nov-2018 12:15   PNA (pneumonia):  Entered Date: 17-Nov-2018 18:21   Dependence on renal dialysis: Entered Date: 16-Jun-2018 11:33   Non-ST elevation myocardial infarction (NSTEMI), type 2: Entered  Date: 15-Mayank-2018 15:41   Abnormal EKG: Onset Date: 06-Mar-2018, Entered Date: 06-Mar-2018  11:34   Infection due to Streptococcus pneumoniae: Onset Date: 16-Feb-2018,  Entered Date: 16-Feb-2018 18:40   Hemoptysis: Onset Date: 16-Feb-2018, Entered Date: 16-Feb-2018  18:40   Abnormal chest CT: Onset Date: 16-Feb-2018, Entered Date: 16-Feb-2018  18:39   Acute on chronic respiratory failure: Onset Date: 16-Feb-2018,  Entered Date: 16-Feb-2018 18:39   Chronic respiratory failure: Onset Date: 16-Feb-2018, Entered  Date: 16-Feb-2018 00:02   ESRD (end stage renal disease) on dialysis: Entered Date: 09-Feb-2018  22:25   History of hemoptysis: Entered Date: 08-Feb-2018 17:46   Hypertensive heart disease without heart failure: Entered Date:  31-Jan-2018 21:11   End-stage renal disease: Entered Date: 31-Jan-2018 21:11   Diarrhea: Entered Date: 29-Jan-2018 11:00   Sepsis: Entered Date: 29-Jan-2018 11:00   HCAP (healthcare-associated pneumonia): Entered Date: 29-Jan-2018  10:59   Diffuse pulmonary alveolar hemorrhage: Entered Date: 04-Dec-2017  07:07   Hypertensive heart disease without heart failure: Entered Date:  26-Jul-2017 19:35   Encounter for preadmission testing: Onset Date: 09-Feb-2017,  Entered Date: 13-Feb-2017 08:52   Encounter for other preprocedural examination: Entered Date:  13-Dec-2016 15:54       Medical History:   CHF, acute on chronic: Entered Date: 15-Nov-2019 08:58   ESRD (end stage renal disease) on dialysis: Entered Date: 15-Nov-2019  08:58   Volume overload: Entered Date: 15-Nov-2019 08:58   Current nonadherence to medical treatment: Entered Date: 13-Nov-2019  06:09   CHF (congestive heart failure): Onset Date: 30-Jan-2018, Entered  Date: 30-Jan-2018 15:45   Anemia: Entered Date: 30-Nov-2017 20:07   Pulmonary hemorrhage: Entered Date:  30-Nov-2017 19:49   Hypothyroidism: Entered Date: 26-Jul-2017 01:23   COPD (chronic obstructive pulmonary disease): Entered Date:  26-Jul-2017 01:22   Hypertension: Entered Date: 26-Jul-2017 01:22   ESRD (end stage renal disease) on dialysis: Entered Date: 26-Jul-2017  01:22       Surg History:   Tachycardia: Onset Date: 14-Nov-2019, Entered Date: 14-Nov-2019  09:57   Shortness of breath at rest: Onset Date: 28-Oct-2019, Entered  Date: 28-Oct-2019 10:55   Chest pain: Onset Date: 29-Nov-2017, Entered Date: 29-Nov-2017  10:18   Presence of surgically created primary arteriovenous shunt for hemodialysis : Entered Date: 26-Jul-2017 01:22    Code Status:  ·  Code Status Full Code       Impression 1: End-stage renal disease   Plan for Impression 1: Dialysis Monday Wednesday Friday   Impression 2: Congestive heart failure/atelectasis   Plan for Impression 2: Continue ultrafiltration on  dialysis respiratory aerosols and treatment as per pulmonary   Impression 3: Anemia of chronic disease and blood  loss   Plan for Impression 3: Continue to adjust DIAZ we  will start patient on iron supplementation when the ferritin levels down trended to less than 1000       Electronic Signatures:  Daren Bocanegra)  (Signed 01-Aug-2023 12:06)   Authored: Service, Subjective Data, Objective Data, Assessment  and Plan, Note Completion      Last Updated: 01-Aug-2023 12:06 by Daren Bocanegra)

## 2023-09-30 NOTE — PROGRESS NOTES
Service: Nephrology     Subjective Data:   VASQUEZ PAK is a 52 year old Female who is Hospital Day # 22.     Patient is requesting to decrease the amount of dialysis sessions to 2 hours from 2-1/2  Renal chemistries are pending  Serum calcium level is pending.    Objective Data:   Physical Exam by System:    Constitutional: Alert oriented  Asthenic habitus   Eyes: pupils react to light and accommodation   ENMT: mucous membranes moist, no apparent injury,  no lesions seen   Head/Neck: Full range of motion no thyromegaly   Respiratory/Thorax: Bibasilar inspiratory crackles   Cardiovascular: 1/6 sternal border systolic murmur  no gallop no thrills or rubs   Gastrointestinal: Active peristalsis no rebound or  guarding.  She had an NG tube from the left nostril   Genitourinary: No CVA tenderness   Musculoskeletal: Decreased muscle tone   Extremities: There is a dark spot in the tip of the  index fingers suggestive of old embolic episode   Neurological: No tremors no asterixis   Breast: No masses, tenderness, no discharge or discoloration   Lymphatic: No significant lymphadenopathy   Psychological: Appropriate mood and behavior   Skin: Poor turgor     Assessment and Plan:        Additional Dx:   Abnormal electrocardiogram: Onset Date: 16-Feb-2023, Entered  Date: 16-Feb-2023 08:07   Shortness of breath at rest: Onset Date: 10-Mar-2020, Entered  Date: 10-Mar-2020 09:46   Atypical chest pain: Entered Date: 28-Jan-2020 14:41   Tachycardia: Entered Date: 16-Jan-2020 07:44   Acute respiratory failure with hypoxia: Entered Date: 15-Laz-2020  06:46   Hyperkalemia: Entered Date: 15-Laz-2020 06:46   Fluid overload: Entered Date: 15-Laz-2020 06:46   Respiratory failure: Entered Date: 13-Nov-2019 03:17   Diastolic heart failure secondary to hypertension: Entered  Date: 27-Oct-2019 03:29   Hypertensive crisis: Entered Date: 27-Oct-2019 03:28   Pre-operative exam: Entered Date: 12-Sep-2019 13:28   Pre-operative  exam: Entered Date: 12-Sep-2019 13:28   Hypertensive heart disease without heart failure: Entered Date:  23-Nov-2018 10:26   End-stage renal disease: Entered Date: 23-Nov-2018 10:26   Hypertensive heart disease with heart failure: Entered Date:  23-Nov-2018 10:26   Acute on chronic diastolic congestive heart failure: Entered  Date: 23-Nov-2018 10:26   Idiopathic pulmonary hemosiderosis: Entered Date: 21-Nov-2018  13:11   Troponin level elevated: Entered Date: 19-Nov-2018 12:15   PNA (pneumonia): Entered Date: 17-Nov-2018 18:21   Dependence on renal dialysis: Entered Date: 16-Jun-2018 11:33   Non-ST elevation myocardial infarction (NSTEMI), type 2: Entered  Date: 15-Mayank-2018 15:41   Abnormal EKG: Onset Date: 06-Mar-2018, Entered Date: 06-Mar-2018  11:34   Infection due to Streptococcus pneumoniae: Onset Date: 16-Feb-2018,  Entered Date: 16-Feb-2018 18:40   Hemoptysis: Onset Date: 16-Feb-2018, Entered Date: 16-Feb-2018  18:40   Abnormal chest CT: Onset Date: 16-Feb-2018, Entered Date: 16-Feb-2018  18:39   Acute on chronic respiratory failure: Onset Date: 16-Feb-2018,  Entered Date: 16-Feb-2018 18:39   Chronic respiratory failure: Onset Date: 16-Feb-2018, Entered  Date: 16-Feb-2018 00:02   ESRD (end stage renal disease) on dialysis: Entered Date: 09-Feb-2018  22:25   History of hemoptysis: Entered Date: 08-Feb-2018 17:46   Hypertensive heart disease without heart failure: Entered Date:  31-Jan-2018 21:11   End-stage renal disease: Entered Date: 31-Jan-2018 21:11   Diarrhea: Entered Date: 29-Jan-2018 11:00   Sepsis: Entered Date: 29-Jan-2018 11:00   HCAP (healthcare-associated pneumonia): Entered Date: 29-Jan-2018  10:59   Diffuse pulmonary alveolar hemorrhage: Entered Date: 04-Dec-2017  07:07   Hypertensive heart disease without heart failure: Entered Date:  26-Jul-2017 19:35   Encounter for preadmission testing: Onset Date: 09-Feb-2017,  Entered Date: 13-Feb-2017 08:52   Encounter for other preprocedural examination:  Entered Date:  13-Dec-2016 15:54       Medical History:   CHF, acute on chronic: Entered Date: 15-Nov-2019 08:58   ESRD (end stage renal disease) on dialysis: Entered Date: 15-Nov-2019  08:58   Volume overload: Entered Date: 15-Nov-2019 08:58   Current nonadherence to medical treatment: Entered Date: 13-Nov-2019  06:09   CHF (congestive heart failure): Onset Date: 30-Jan-2018, Entered  Date: 30-Jan-2018 15:45   Anemia: Entered Date: 30-Nov-2017 20:07   Pulmonary hemorrhage: Entered Date: 30-Nov-2017 19:49   Hypothyroidism: Entered Date: 26-Jul-2017 01:23   COPD (chronic obstructive pulmonary disease): Entered Date:  26-Jul-2017 01:22   Hypertension: Entered Date: 26-Jul-2017 01:22   ESRD (end stage renal disease) on dialysis: Entered Date: 26-Jul-2017  01:22       Surg History:   Tachycardia: Onset Date: 14-Nov-2019, Entered Date: 14-Nov-2019  09:57   Shortness of breath at rest: Onset Date: 28-Oct-2019, Entered  Date: 28-Oct-2019 10:55   Chest pain: Onset Date: 29-Nov-2017, Entered Date: 29-Nov-2017  10:18   Presence of surgically created primary arteriovenous shunt for hemodialysis : Entered Date: 26-Jul-2017 01:22    Code Status:  ·  Code Status Full Code       Impression 1: ESRD   Plan for Impression 1: Continue current dialysis  prescription   Impression 2: Hypercalcemia   Plan for Impression 2: Secondary to mobility and  secondary hyperparathyroidism of renal origin  Continue low calcium bath continue Cinacalcet continue to hold vitamin D analogs   Impression 3: Severe protein calorie malnutrition   Plan for Impression 3: Continue protein supplementation       Electronic Signatures:  Daren Bocanegra)  (Signed 17-Aug-2023 12:18)   Authored: Service, Subjective Data, Objective Data, Assessment  and Plan, Note Completion      Last Updated: 17-Aug-2023 12:18 by Daren Bocanegra)

## 2023-09-30 NOTE — PROGRESS NOTES
Service: Nephrology     Subjective Data:   VASQUEZ PAK is a 52 year old Female who is Hospital Day # 13.     Patient had dialysis today serum calcium downtrending today her calcium is 12.4, she received a dose of zoledronic acid yesterday.  Her ionized calcium was 1.37  Serum PTH is elevated 180s and serum protein electrophoresis report is pending.    Objective Data:   Physical Exam by System:    Constitutional: Alert oriented  Asthenic habitus   Eyes: Sunken eyes pupils react to light and accommodation   ENMT: mucous membranes moist, no apparent injury,  no lesions seen   Head/Neck: Full range of motion no thyromegaly   Respiratory/Thorax: Bibasilar inspiratory crackles  and scattered expiratory wheezing anteriorly   Cardiovascular: 1/6 sternal border systolic murmur   Gastrointestinal: Active peristalsis no rebound or  guarding.  She had an NG tube from the left nostril   Genitourinary: No CVA tenderness   Musculoskeletal: Decreased muscle tone   Extremities: Edema 1+   Neurological: No tremors no asterixis   Breast: No masses, tenderness, no discharge or discoloration   Lymphatic: No significant lymphadenopathy   Psychological: Appropriate mood and behavior   Skin: Poor turgor     Recent Lab Results:    Results:        RFP: 8/8/2023 04:50  NA+        Cl-     BUN  /                         136    88 L   45 H  /  --------------------------------  Glucose                ---------------------------  121 H    K+     HCO3-   Creat \                         4.0    36 H   4.57 H  \  Calcium : 12.4 HAnion Gap : 16          Albumin : 4.7     Phos : 6.9 H        I have reviewed these laboratory results:    Renal Function Panel  08-Aug-2023 04:50:00      Result Value    Glucose, Serum  121   H   NA  136    K  4.0    CL  88   L   Bicarbonate, Serum  36   H   Anion Gap, Serum  16    BUN  45   H   CREAT  4.57   H   GFR Female  11   A   Calcium, Serum  12.4   H   Phosphorus, Serum  6.9   H   ALB   4.7      Protein Electrophoresis, Serum  08-Aug-2023 04:50:00      Result Value    T Pro  9.6   H     Parathormone Intact, Serum  08-Aug-2023 04:50:00      Result Value    Parathormone Intact, Serum  183.0   H       Assessment and Plan:        Additional Dx:   Abnormal electrocardiogram: Onset Date: 16-Feb-2023, Entered  Date: 16-Feb-2023 08:07   Shortness of breath at rest: Onset Date: 10-Mar-2020, Entered  Date: 10-Mar-2020 09:46   Atypical chest pain: Entered Date: 28-Jan-2020 14:41   Tachycardia: Entered Date: 16-Jan-2020 07:44   Acute respiratory failure with hypoxia: Entered Date: 15-Laz-2020  06:46   Hyperkalemia: Entered Date: 15-Laz-2020 06:46   Fluid overload: Entered Date: 15-Laz-2020 06:46   Respiratory failure: Entered Date: 13-Nov-2019 03:17   Diastolic heart failure secondary to hypertension: Entered  Date: 27-Oct-2019 03:29   Hypertensive crisis: Entered Date: 27-Oct-2019 03:28   Pre-operative exam: Entered Date: 12-Sep-2019 13:28   Pre-operative exam: Entered Date: 12-Sep-2019 13:28   Hypertensive heart disease without heart failure: Entered Date:  23-Nov-2018 10:26   End-stage renal disease: Entered Date: 23-Nov-2018 10:26   Hypertensive heart disease with heart failure: Entered Date:  23-Nov-2018 10:26   Acute on chronic diastolic congestive heart failure: Entered  Date: 23-Nov-2018 10:26   Idiopathic pulmonary hemosiderosis: Entered Date: 21-Nov-2018  13:11   Troponin level elevated: Entered Date: 19-Nov-2018 12:15   PNA (pneumonia): Entered Date: 17-Nov-2018 18:21   Dependence on renal dialysis: Entered Date: 16-Jun-2018 11:33   Non-ST elevation myocardial infarction (NSTEMI), type 2: Entered  Date: 15-Mayank-2018 15:41   Abnormal EKG: Onset Date: 06-Mar-2018, Entered Date: 06-Mar-2018  11:34   Infection due to Streptococcus pneumoniae: Onset Date: 16-Feb-2018,  Entered Date: 16-Feb-2018 18:40   Hemoptysis: Onset Date: 16-Feb-2018, Entered Date: 16-Feb-2018  18:40   Abnormal chest CT: Onset Date:  16-Feb-2018, Entered Date: 16-Feb-2018  18:39   Acute on chronic respiratory failure: Onset Date: 16-Feb-2018,  Entered Date: 16-Feb-2018 18:39   Chronic respiratory failure: Onset Date: 16-Feb-2018, Entered  Date: 16-Feb-2018 00:02   ESRD (end stage renal disease) on dialysis: Entered Date: 09-Feb-2018  22:25   History of hemoptysis: Entered Date: 08-Feb-2018 17:46   Hypertensive heart disease without heart failure: Entered Date:  31-Jan-2018 21:11   End-stage renal disease: Entered Date: 31-Jan-2018 21:11   Diarrhea: Entered Date: 29-Jan-2018 11:00   Sepsis: Entered Date: 29-Jan-2018 11:00   HCAP (healthcare-associated pneumonia): Entered Date: 29-Jan-2018  10:59   Diffuse pulmonary alveolar hemorrhage: Entered Date: 04-Dec-2017  07:07   Hypertensive heart disease without heart failure: Entered Date:  26-Jul-2017 19:35   Encounter for preadmission testing: Onset Date: 09-Feb-2017,  Entered Date: 13-Feb-2017 08:52   Encounter for other preprocedural examination: Entered Date:  13-Dec-2016 15:54       Medical History:   CHF, acute on chronic: Entered Date: 15-Nov-2019 08:58   ESRD (end stage renal disease) on dialysis: Entered Date: 15-Nov-2019  08:58   Volume overload: Entered Date: 15-Nov-2019 08:58   Current nonadherence to medical treatment: Entered Date: 13-Nov-2019  06:09   CHF (congestive heart failure): Onset Date: 30-Jan-2018, Entered  Date: 30-Jan-2018 15:45   Anemia: Entered Date: 30-Nov-2017 20:07   Pulmonary hemorrhage: Entered Date: 30-Nov-2017 19:49   Hypothyroidism: Entered Date: 26-Jul-2017 01:23   COPD (chronic obstructive pulmonary disease): Entered Date:  26-Jul-2017 01:22   Hypertension: Entered Date: 26-Jul-2017 01:22   ESRD (end stage renal disease) on dialysis: Entered Date: 26-Jul-2017  01:22       Surg History:   Tachycardia: Onset Date: 14-Nov-2019, Entered Date: 14-Nov-2019  09:57   Shortness of breath at rest: Onset Date: 28-Oct-2019, Entered  Date: 28-Oct-2019 10:55   Chest pain: Onset  Date: 29-Nov-2017, Entered Date: 29-Nov-2017  10:18   Presence of surgically created primary arteriovenous shunt for hemodialysis : Entered Date: 26-Jul-2017 01:22    Code Status:  ·  Code Status Full Code       Impression 1: End-stage renal disease   Plan for Impression 1: Dialysis as per schedule   Impression 2: Hypercalcemia   Plan for Impression 2: Working the patient up for  possible malignancy  Protein electrophoresis is pending  Continue current dialysis prescription with low calcium bath and patient received for baseline  Acid  We will start using this Cinacalcet 30 mg p.o. daily   Impression 3: Severe protein calorie malnutrition   Plan for Impression 3: With anorexia patient on Periactin  protein supplements  Patient  advised on the importance of patient to increase her protein intake       Electronic Signatures:  Daren Bocanegra)  (Signed 08-Aug-2023 13:43)   Authored: Service, Subjective Data, Objective Data, Assessment  and Plan, Note Completion      Last Updated: 08-Aug-2023 13:43 by Daren Bocanegra)

## 2023-09-30 NOTE — PROGRESS NOTES
Service: Nephrology     Subjective Data:   VASQUEZ PAK is a 52 year old Female who is Hospital Day # 20.     No new complaints.    Objective Data:   Physical Exam by System:    Constitutional: Alert oriented  Asthenic habitus   Eyes: Sunken eyes pupils react to light and accommodation   ENMT: mucous membranes moist, no apparent injury,  no lesions seen   Head/Neck: Full range of motion no thyromegaly   Respiratory/Thorax: Bibasilar inspiratory crackles   Cardiovascular: 1/6 sternal border systolic murmur  no gallop no thrills or rubs   Gastrointestinal: Active peristalsis no rebound or  guarding.  She had an NG tube from the left nostril   Genitourinary: No CVA tenderness   Musculoskeletal: Decreased muscle tone   Extremities: There is a dark spot in the tip of the  index fingers suggestive of old embolic episode   Neurological: No tremors no asterixis   Breast: No masses, tenderness, no discharge or discoloration   Lymphatic: No significant lymphadenopathy   Psychological: Appropriate mood and behavior   Skin: Poor turgor     Assessment and Plan:   Code Status:  ·  Code Status Full Code       Impression 1: End-stage renal disease   Plan for Impression 1: We will adjust dialysis dose   Impression 2: Hypercalcemia   Plan for Impression 2: Secondary to immobility secondary  hyperparathyroidism of renal origin  Continue low calcium bath  Continue Cinacalcet  As needed zoledronic acid   Impression 3: Anemia chronic illness and renal disease   Plan for Impression 3: Monitor CBC   Impression 4: Anorexia   Plan for Impression 4: Continue cyproheptadine to  encourage protein intake   Impression 5: Severe protein calorie malnutrition   Plan for Impression 5: We will hold phosphate binders  until nutritional status improve       Electronic Signatures:  Daren Bocanegra)  (Signed 15-Aug-2023 11:58)   Authored: Service, Subjective Data, Objective Data, Assessment  and Plan, Note Completion      Last  Updated: 15-Aug-2023 11:58 by Daren Bocanegra)

## 2023-09-30 NOTE — H&P
History & Physical Reviewed:   Pregnant/Lactating:  ·  Are You Pregnant no   ·  Are You Currently Breastfeeding no     I have reviewed the History and Physical dated:  25-Sep-2023   History and Physical reviewed and relevant findings noted. Patient examined to review pertinent physical  findings.: No significant changes   Home Medications Reviewed: no changes noted   Allergies Reviewed: no changes noted       ERAS (Enhanced Recovery After Surgery):  ·  ERAS Patient: no     Consent:   COVID-19 Consent:  ·  COVID-19 Risk Consent Surgeon has reviewed key risks related to the risk of kim COVID-19 and if they contract COVID-19 what the risks are.     Attestation:   Note Completion:        Electronic Signatures:  Kamaljit Wu)  (Signed 29-Sep-2023 15:54)   Authored: Note Completion   Co-Signer: History & Physical Reviewed, ERAS, Consent, Note Completion  Sandi Hong (Resident))  (Signed 26-Sep-2023 05:55)   Authored: History & Physical Reviewed, ERAS, Consent,  Note Completion      Last Updated: 29-Sep-2023 15:54 by Kamaljit Wu)

## 2023-09-30 NOTE — PROGRESS NOTES
Service: Nephrology     Subjective Data:   VASQUEZ PAK is a 53 year old Female who is Hospital Day # 6.    Additional Information:    More alert this morning, but still not conversational   Tolerated HD yesterday   Plan for another session today     Objective Data:     Objective Information:      T   P  R  BP   MAP  SpO2   Value  36.7  92  17  157/54   70  99%  Date/Time 9/27 12:00 9/27 12:00 9/27 12:00 9/26 20:52  9/26 12:00 9/27 12:00  Range  (36.1C - 36.8C )  (80 - 98 )  (10 - 33 )  (109 - 173 )/ (19 - 62 )  (43 - 70 )  (77% - 100% )   As of 27-Sep-2023 12:00:00, patient is on 4 L/min of oxygen via nasal cannula with humidification.      Pain reported at 9/26 16:00: 0  Pain reported at 9/27 12:00: 4  Pain reported at 9/27 2:00: 9 = Severe    ---- Intake and Output  -----  Mn/Dy/Year Time  Intake   Output  Net  Sep 27, 2023 6:00 am  530   0  530  Sep 26, 2023 10:00 pm  605   2400  -1795  Sep 26, 2023 2:00 pm  1090   0  1090    The Intake and Output Totals for the last 24 hours are:      Intake   Output  Net      7116   2400  -175    Physical Exam Narrative:  ·  Physical Exam:    Middle aged F, cachectic  CVS: S1 S2 murmur  RESP: Scattered crackles bilaterally   ABDO: Soft   Extremities: No edema     RUE dressing in place over graft, R arm swollen     Medication:    Medications:          Continuous Medications       --------------------------------  No continuous medications are active       Scheduled Medications       --------------------------------    1. Collagenase 250 Units/ gram Topical:  1  application(s)  Topical  Daily    2. DAPTOmycin IV Piggy Back:  350  mg  IntraVenous Piggyback  Every 48 Hours    3. Levothyroxine Injectable:  87.5  microgram(s)  IntraVenous Push  Daily    4. Melatonin:  3  mg  Oral  Daily 1800    5. Pantoprazole Injectable:  40  mg  IntraVenous Push  Every 12 Hours    6. Piperacillin - Tazobactam 2.25 grams/Iso-osmotic 50 mL Premix IVPB:  50  mL  IntraVenous  Piggyback  Every 8 Hours    7. Thiamine IV Piggy Back:  500  mg  IntraVenous Piggyback  3 Times a Day         PRN Medications       --------------------------------    1. Acetaminophen:  650  mg  Oral  Every 4 Hours    2. Albuterol 2.5 mg - Ipratropium 0.5 mg/ 3 mL Neb Soln:  3  mL  Inhalation  4 Times a Day    3. Dextrose 50% in Water Injectable:  25  gram(s)  IntraVenous Push  Every 15 Minutes    4. Dextrose 50% in Water Injectable:  12.5  gram(s)  IntraVenous Push  Every 15 Minutes    5. Glucagon Injectable:  1  mg  IntraMuscular  Every 15 Minutes    6. HYDROmorphone Injectable:  0.4  mg  IntraVenous Push  Every 4 Hours    7. Ondansetron Injectable:  4  mg  IntraVenous Push  Every 6 Hours           Currently Suspended Medications       --------------------------------    1. oxyCODONE Immediate Release:  5  mg  Oral  Every 4 Hours      Recent Lab Results:    Results:    CBC: 9/27/2023 02:29              \     Hgb     /                              \     9.6 L    /  WBC  ----------------  Plt               10.0       ----------------    49 L            /     Hct     \                              /     29.0 L    \            RBC: 3.29 L    MCV: 88           CMP: 9/27/2023 02:29  NA+        Cl-     BUN  /                         134 L   98    14  /  --------------------------------  Glucose                ---------------------------  109 H    K+     HCO3-   Creat \                         3.8    27    1.73 H \           \  T Bili  /                    \  2.1 H /  AST  x ---- x ALT        5 Lx ---- x 6 L         /  Alk P   \               /  157 H \  Calcium : 9.0     Anion Gap : 13     Albumin : 3.1 L    T Protein : 5.5 L           Coagulation: 9/27/2023 02:29  PT  /                    18.3 H /  -------<    INR          ----------<      1.6 H  PTT\                    41 H \                       Assessment and Plan:   Comorbidities:  ·  Comorbidity Other     Code Status:  ·  Code Status DNAR with Added  Limitations     Assessment:    VASQUEZ PAK is a 53 year old Female with PMH ESRD on HD MWF via RUE AVF, COPD on home O2 6L, 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 Pt had HD at OSH on 9/22 and had complete session. Towards end of HD session, RUE AVG was bleeding excessively. Graft was ligated on 9/22 and pt transferred to [facility] to have graft removed. On 9/23, pt  was scheduled for OR graft explant but case cancelled as pt was hypoglycemic, hypotensive, and alterred so transferred to CT ICU on 9/23. Nephrology following for dialysis needs.    Impression  ESRD on HD MWF - previously with RUE AVG - now ligated; last HD was on 9/22 at OSH (completed most of her session)  RUE AVG bleeding - ligated 9/22 at OSH  - Off pressors     -Thus far, blood cultures have been negative  -Oxygen requirements improved to 4L nasal cannula (baseline oxygen requirements)   -ID on board- on Linezolid and Zosyn   -Uncertain timing of vascular surgery intervention     -Tolerated HD yesterday with 2L UF   -Access: R IJ trialysis line     Recommendations:  -HD today as per submitted orders   -Will require placement of tunnelled dialysis catheter prior to discharge     SW with Dr Christin Yap MD   Nephrology fellow PGY 4     Attestation:   Note Completion:  I am a:  Resident/Fellow   Attending Attestation I saw and evaluated the patient.  I personally obtained the key and critical portions of the history and physical exam or was physically present for key and  critical portions performed by the resident/fellow. I reviewed the resident/fellow?s documentation and discussed the patient with the resident/fellow.  I agree with the resident/fellow?s medical decision making as documented in the note.     I personally evaluated the patient on 27-Sep-2023         Electronic Signatures:  Shilo Yap (MD (Fellow))  (Signed 27-Sep-2023  13:18)   Authored: Service, Subjective Data, Objective Data, Assessment  and Plan, Note Completion  Igor Waller)  (Signed 28-Sep-2023 12:53)   Authored: Note Completion   Co-Signer: Service, Subjective Data, Objective Data, Assessment and Plan, Note Completion      Last Updated: 28-Sep-2023 12:53 by Igor Waller)

## 2023-09-30 NOTE — PROGRESS NOTES
Service: Nephrology     Subjective Data:   VASQUEZ PAK is a 52 year old Female who is Hospital Day # 11.     Patient refers complaints of nausea with use of Zofran.    Objective Data:   Physical Exam by System:    Constitutional: Alert oriented  Asthenic habitus   Eyes: Sunken eyes pupils react to light and accommodation   ENMT: mucous membranes moist, no apparent injury,  no lesions seen   Head/Neck: Full range of motion no thyromegaly   Respiratory/Thorax: Bibasilar inspiratory crackles  and scattered expiratory wheezing anteriorly   Cardiovascular: 1/6 sternal border systolic murmur   Gastrointestinal: Active peristalsis no rebound or  guarding.  She had an NG tube from the left nostril   Genitourinary: No CVA tenderness   Musculoskeletal: Decreased muscle tone   Extremities: Edema 1+   Neurological: No tremors no asterixis   Breast: No masses, tenderness, no discharge or discoloration   Lymphatic: No significant lymphadenopathy   Psychological: Appropriate mood and behavior   Skin: Poor turgor     Recent Lab Results:    Results:        I have reviewed these laboratory results:    Clost Diff. Toxin, PCR  05-Aug-2023 14:33:00      Result Value    Clostridium Difficile Toxin, PCR  NOT DETECTED  Reference Range: Not Detected This assay detects the presence of the tcdB (toxin B) gene via DNA amplification, and results should be interpreted  in the context of the patients history and clinical findings. This test cannot be p    Fluid Source  Stool        Radiology Results:    Results:        Impression:    Diffuse congestion/pulmonary edema, appears slightly worse from  previous study. Recommend continued follow-up.        Xray Chest 1 View [Aug  4 2023  8:59AM]      Assessment and Plan:        Additional Dx:   Abnormal electrocardiogram: Onset Date: 16-Feb-2023, Entered  Date: 16-Feb-2023 08:07   Shortness of breath at rest: Onset Date: 10-Mar-2020, Entered  Date: 10-Mar-2020 09:46   Atypical  chest pain: Entered Date: 28-Jan-2020 14:41   Tachycardia: Entered Date: 16-Jan-2020 07:44   Acute respiratory failure with hypoxia: Entered Date: 15-Laz-2020  06:46   Hyperkalemia: Entered Date: 15-Laz-2020 06:46   Fluid overload: Entered Date: 15-Laz-2020 06:46   Respiratory failure: Entered Date: 13-Nov-2019 03:17   Diastolic heart failure secondary to hypertension: Entered  Date: 27-Oct-2019 03:29   Hypertensive crisis: Entered Date: 27-Oct-2019 03:28   Pre-operative exam: Entered Date: 12-Sep-2019 13:28   Pre-operative exam: Entered Date: 12-Sep-2019 13:28   Hypertensive heart disease without heart failure: Entered Date:  23-Nov-2018 10:26   End-stage renal disease: Entered Date: 23-Nov-2018 10:26   Hypertensive heart disease with heart failure: Entered Date:  23-Nov-2018 10:26   Acute on chronic diastolic congestive heart failure: Entered  Date: 23-Nov-2018 10:26   Idiopathic pulmonary hemosiderosis: Entered Date: 21-Nov-2018  13:11   Troponin level elevated: Entered Date: 19-Nov-2018 12:15   PNA (pneumonia): Entered Date: 17-Nov-2018 18:21   Dependence on renal dialysis: Entered Date: 16-Jun-2018 11:33   Non-ST elevation myocardial infarction (NSTEMI), type 2: Entered  Date: 15-Mayank-2018 15:41   Abnormal EKG: Onset Date: 06-Mar-2018, Entered Date: 06-Mar-2018  11:34   Infection due to Streptococcus pneumoniae: Onset Date: 16-Feb-2018,  Entered Date: 16-Feb-2018 18:40   Hemoptysis: Onset Date: 16-Feb-2018, Entered Date: 16-Feb-2018  18:40   Abnormal chest CT: Onset Date: 16-Feb-2018, Entered Date: 16-Feb-2018  18:39   Acute on chronic respiratory failure: Onset Date: 16-Feb-2018,  Entered Date: 16-Feb-2018 18:39   Chronic respiratory failure: Onset Date: 16-Feb-2018, Entered  Date: 16-Feb-2018 00:02   ESRD (end stage renal disease) on dialysis: Entered Date: 09-Feb-2018  22:25   History of hemoptysis: Entered Date: 08-Feb-2018 17:46   Hypertensive heart disease without heart failure: Entered Date:  31-Jan-2018  21:11   End-stage renal disease: Entered Date: 31-Jan-2018 21:11   Diarrhea: Entered Date: 29-Jan-2018 11:00   Sepsis: Entered Date: 29-Jan-2018 11:00   HCAP (healthcare-associated pneumonia): Entered Date: 29-Jan-2018  10:59   Diffuse pulmonary alveolar hemorrhage: Entered Date: 04-Dec-2017  07:07   Hypertensive heart disease without heart failure: Entered Date:  26-Jul-2017 19:35   Encounter for preadmission testing: Onset Date: 09-Feb-2017,  Entered Date: 13-Feb-2017 08:52   Encounter for other preprocedural examination: Entered Date:  13-Dec-2016 15:54       Medical History:   CHF, acute on chronic: Entered Date: 15-Nov-2019 08:58   ESRD (end stage renal disease) on dialysis: Entered Date: 15-Nov-2019  08:58   Volume overload: Entered Date: 15-Nov-2019 08:58   Current nonadherence to medical treatment: Entered Date: 13-Nov-2019  06:09   CHF (congestive heart failure): Onset Date: 30-Jan-2018, Entered  Date: 30-Jan-2018 15:45   Anemia: Entered Date: 30-Nov-2017 20:07   Pulmonary hemorrhage: Entered Date: 30-Nov-2017 19:49   Hypothyroidism: Entered Date: 26-Jul-2017 01:23   COPD (chronic obstructive pulmonary disease): Entered Date:  26-Jul-2017 01:22   Hypertension: Entered Date: 26-Jul-2017 01:22   ESRD (end stage renal disease) on dialysis: Entered Date: 26-Jul-2017  01:22       Surg History:   Tachycardia: Onset Date: 14-Nov-2019, Entered Date: 14-Nov-2019  09:57   Shortness of breath at rest: Onset Date: 28-Oct-2019, Entered  Date: 28-Oct-2019 10:55   Chest pain: Onset Date: 29-Nov-2017, Entered Date: 29-Nov-2017  10:18   Presence of surgically created primary arteriovenous shunt for hemodialysis : Entered Date: 26-Jul-2017 01:22    Code Status:  ·  Code Status Full Code       Impression 1: ESRD   Plan for Impression 1: Continue current dialysis  prescription  Adjust ultrafiltration   Impression 2: CHF   Plan for Impression 2: Ultrafiltration   Impression 3: Anemia renal disease   Plan for Impression 3:  Iron/DIAZ   Impression 4: Nausea/vomiting   Plan for Impression 4: We will add Reglan   Impression 5: Anorexia   Plan for Impression 5: Continue Periactin   Impression 6: Protein calorie malnutrition   Plan for Impression 6: Continue protein supplements       Electronic Signatures:  Daren Bocanegra)  (Signed 06-Aug-2023 11:59)   Authored: Service, Subjective Data, Objective Data, Assessment  and Plan, Note Completion      Last Updated: 06-Aug-2023 11:59 by Daren Bocanegra)

## 2023-09-30 NOTE — PROGRESS NOTES
[facility]  ACUTE CARE SURGERY - PROGRESS NOTE    Patient Name: Kesha Lowe  MRN: [id number]  Admit Date: 922  : 1970  AGE: 53 y.o.   GENDER: female  ==============================================================================  TODAY'S ASSESSMENT AND PLAN OF CARE:    53-year-old female status post right upper extremity arteriovenous graft explant and stage IV decubitus ulcer debridement with vascular and ACS.      Recommendations  -Continue Wet to dry Kerlix and cover with mepilex BID dressing changes  -ACS will sign off     Please page ACS [phone] with any questions or concerns.  ==============================================================================  CHIEF COMPLAINT / EVENTS LAST 24HRS / HPI:  Patient refused POC last night and had some oozing from sacral wound overnight. Otherwise doing well.     MEDICAL HISTORY / ROS:   Admission history and ROS reviewed. Pertinent changes as follows: no changes    PHYSICAL EXAM:  Heart Rate:  [84-98]   Temp:  [36 °C (96.8 °F)-36.5 °C (97.7 °F)]   Resp:  [15-18]   BP: (132-184)/(57-66)   SpO2:  [96 %-100 %]   Physical Exam  GEN: Alert and oriented, calm  HEENT: Dophoff in place  CV: Regular rate  PULM: Non labored respirations on room air  GI: Abdomen soft, flat, nontender, non distended  : Sacral wound packed with wet to dry kerlix and covered with mepilex  Ext: RUE in ACE bandage  Psych: Appropriate mood    LABS:  Results for orders placed or performed during the hospital encounter of 23 (from the past 24 hour(s))   POCT GLUCOSE   Result Value Ref Range    POCT Glucose 73 (L) 74 - 99 mg/dL   POCT GLUCOSE   Result Value Ref Range    POCT Glucose 65 (L) 74 - 99 mg/dL   POCT GLUCOSE   Result Value Ref Range    POCT Glucose 78 74 - 99 mg/dL   POCT GLUCOSE   Result Value Ref Range    POCT Glucose 104 (H) 74 - 99 mg/dL     MEDICATIONS:  Current Facility-Administered Medications   Medication Dose Route  Frequency Provider Last Rate Last Admin    DAPTOmycin (Cubicin) 350 mg in sodium chloride 0.9% 50 mL IV  350 mg intravenous q48h Kamaljit Wu MD        dextrose 10 % infusion  0.3 g/kg/hr intravenous Once PRN Kamaljit Wu MD        dextrose 50 % injection 12.5 g  12.5 g intravenous q15 min PRN Kamaljit Wu MD        dextrose 50 % injection 25 g  25 g intravenous q15 min PRN Kamaljit Wu MD        ergocalciferol (Vitamin D-2) drops 8,000 Units  8,000 Units oral Daily Kamaljit Wu MD   8,000 Units at 09/30/23 1152    glucagon (Glucagen) injection 1 mg  1 mg intramuscular q15 min PRN Kamaljit Wu MD        HYDROmorphone (Dilaudid) injection 0.4 mg  0.4 mg intravenous q4h PRN Kamaljit Wu MD        insulin lispro (HumaLOG) injection 0-5 Units  0-5 Units subcutaneous q6h Georgette Ibrahim DO        lidocaine 4 % patch 1 patch  1 patch transdermal q24h Kamaljit Wu MD        melatonin tablet 3 mg  3 mg oral Nightly Kamaljit Wu MD        ondansetron (Zofran) injection 4 mg  4 mg intravenous q6h PRN Kamaljit Wu MD        oxymetazoline (Afrin) 0.05 % nasal spray 2 spray  2 spray Each Nostril q12h PRN Igor Bautista MD        pantoprazole (ProtoNix) injection 40 mg  40 mg intravenous BID Kamaljit Wu MD        piperacillin-tazobactam-dextrose (Zosyn) IV 2.25 g  2.25 g intravenous q8h Kamaljit Wu MD        QUEtiapine (SEROquel) tablet 25 mg  25 mg oral Nightly Kamaljit Wu MD           IMAGING SUMMARY:  No new images    I have reviewed all laboratory and imaging results ordered/pertinent for today's encounter.     Pt discussed with Dr. Cheko Sorensen MD  General Surgery PGY-1  ACS [id number]

## 2023-09-30 NOTE — PROGRESS NOTES
Subjective Data:   ID Statement:  VASQUEZ PAK is a 53 year old Female who is Hospital Day # 3 and ICU Day #2.     Overnight: L brach a line placed, BCx sent, 250 albumin given, 1 unit uncrossed PRBC given.    Objective Data:     ·  Objective Information      T   P  R  BP   MAP  SpO2   Value  36.8  93  21  157/44   74  98%  Date/Time 9/23 18:50 9/24 6:00 9/24 6:00 9/24 5:00  9/24 5:00 9/24 6:00  Range  (36.5C - 36.8C )  (77 - 122 )  (13 - 27 )  (83 - 173 )/ (23 - 128 )  (36 - 141 )  (85% - 100% )   As of 24-Sep-2023 04:00:00, patient is on 5 L/min of oxygen via nasal cannula.      Pain reported at 9/24 4:00: sleeping      Direct Arterial Blood Pressure  Systolic 141 (110 - 157) 9/24 6:00  Diastolic (mm Hg) 44 (28 - 47) 9/24 6:00  Mean (mm Hg) 71 (49 - 77) 9/24 6:00  Pulse Pressure (mm Hg) 97 (82 - 112) 9/24 6:00      ---- Intake and Output  -----  Mn/Dy/Year Time  Intake   Output  Net  Sep 24, 2023 6:00 am  1171   1  1170  Sep 23, 2023 10:00 pm  340   0  340    The Intake and Output Totals for the last 24 hours are:      Intake   Output  Net      1511   1  1510    Date:            Weight/Scale Type:  22-Sep-2023 21:00  44.6  kg / bed    Allergies:       Allergies:  ·  vancomycin : Resp Distress, Itching  ·  carvedilol : Unknown  ·  doxazosin : Unknown  ·  benazepril : Unknown    Recent Lab Results:    Results:    CBC: 9/24/2023 04:47              \     Hgb     /                              \     8.2 L    /  WBC  ----------------  Plt               17.1 H    ----------------    118 L            /     Hct     \                              /     24.0 L    \            RBC: 2.64 L    MCV: 91           RFP: 9/24/2023 04:47  NA+        Cl-     BUN  /                         136    97 L   17  /  --------------------------------  Glucose                ---------------------------  111 H    K+     HCO3-   Creat \                         3.8    30    2.67 H \  Calcium : 8.5 LAnion Gap : 13           Albumin : 2.5 L     Phos : 3.0      Coagulation: 9/23/2023 14:32  PT  /                    22.7 H /  -------<    INR          ----------<      2.0 H  PTT\                    33  \                       ---------- Recent Arterial Blood Gas Results----------     9/23/2023 23:08  pO2 96  24 h range: ( 67 - 96 )  pH 7.40  24 h range: ( 7.4 - 7.42 )  pCO2 48  24 h range: ( 48 - 49 )  SO2 100  24 h range: ( 95 - 100 )  Base Excess 4.5  24 h range: ( 4.5 - 6.6 )null    Assessment and Plan:   Daily Risk Screen:  ·  Does patient have a central line? no   ·  Does patient have an indwelling urinary catheter? no   ·  Is the patient intubated? 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 Patient was admitted on 9/22/2023 as a transfer from [facility] following episode of bleeding  from RUE AVG at dialysis. Exposed graft, large skin defect. Graft ligated by IR at [facility] and transferred to [facility] for further evaluation and management by vascular surgery. Patient was tentatively planned for operative resection of exposed graft today,  however patient was persistently hypoglycemic with NPO, somnolent, intermittent tachycardia/hypotension likely related to hypoglycemia and therefore OR was canceled for today. Patient transferred to SICU for further management. Plan for OR on 9/26/2023.       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  - 1 unit PRBPC given overnight, hgb 6.3->8.2  - Transfuse blood products as appropriate   - Follow up blood culture  - BBIRL sent     ENDO: Hypoglycemia, hypothyroidism   - D10 infusion  - Hourly BG check  - Levothyroxine 175  microgram(s)  Oral  Daily     ID: Afebrile, WBC 11.7 on floor  - Patient on linezolid and Zosyn empirically   - Monitor for any clinical indications of infection    Lines: PIV x 2, left brachial arterial line     Dispo: continue ICU care. Reassess dispo in am      Code Status:  ·  Code Status Full Code     Comorbidities:  ·  Comorbidity Other     Attestation:   Note Completion:  I am a:  Resident/Fellow   Attending Attestation I saw and evaluated the patient.  I personally obtained the key and critical portions of the history and physical exam or was physically present for key and  critical portions performed by the resident/fellow. I reviewed the resident/fellow?s documentation and discussed the patient with the resident/fellow.  I agree with the resident/fellow?s medical decision making as documented in the note.     I personally evaluated the patient on 24-Sep-2023   Critical Care Patient I have reviewed and evaluated the most recent data and results, personally examined the patient, and formulated the plan of care as presented above.  This patient  was critically ill and required continued critical care treatment. Teaching and  any separately billable procedures are not included in the time calculation.    Billing Provider Critical Care Time 36 minute(s)   Primary Critical Care Issue/Treatment (See Assessment and Plan for greater detail) -- For the nature of the critical condition and treatment, this documentation has been prepared by the attending physician/FAVIAN- billing provider of these critical  care services.         Electronic Signatures:  Nubia Harris)  (Signed 24-Sep-2023 12:16)   Authored: Note Completion   Co-Signer: Subjective Data, Assessment and Plan, Note Completion  Julia Smith (DO (Resident))  (Signed 24-Sep-2023 10:46)   Authored: Subjective Data, Objective Data, Assessment  and Plan, Note Completion      Last Updated: 24-Sep-2023 12:16 by Nubia Harris)

## 2023-09-30 NOTE — PROGRESS NOTES
Service: Nephrology     Subjective Data:   VASQUEZ PAK is a 52 year old Female who is Hospital Day # 28.     Patient continues to have hyperkalemia and she refuses dialysis yesterday today she is stating he wants to cut her session short Labs BUN was 118 this morning with a creatinine of 5.53 and a calcium  of 11 potassium was 6.7.    Objective Data:   Physical Exam by System:    Constitutional: Alert   Asthenic habitus   Eyes: pupils react to light and accommodation   ENMT: mucous membranes moist, no apparent injury,  no lesions seen   Head/Neck: Full range of motion no thyromegaly   Respiratory/Thorax: Minimal inspiratory rhonchi   Cardiovascular: 1/6 sternal border systolic murmur  no gallop no thrills or rubs   Gastrointestinal: Active peristalsis no rebound or  guarding.  She had an NG tube from the left nostril   Genitourinary: No CVA tenderness   Musculoskeletal: Decreased muscle tone   Extremities: There is a dark spot in the tip of the  index fingers suggestive of old embolic episode   Neurological: No tremors no asterixis   Breast: No masses, tenderness, no discharge or discoloration   Lymphatic: No significant lymphadenopathy   Psychological: Appropriate mood and behavior   Skin: Poor turgor     Recent Lab Results:    Results:        BMP: 8/23/2023 08:31  NA+        Cl-     BUN  /                         134 L   89 L    118 HH /  --------------------------------  Glucose                ---------------------------  71 L    K+     HCO3-   Creat \                         6.7 HH 22    8.53 H  \  Calcium : 11.0 H    Anion Gap : 30 H      RFP: 8/22/2023 23:19  NA+        Cl-     BUN  /                         134 L   89 L    113 H /  --------------------------------  Glucose                ---------------------------  76    K+     HCO3-   Creat \                         5.8 H   25    8.23 H  \  Calcium : 11.2 HAnion Gap : 26 H          Albumin : 4.8     Phos : 8.6  H        I have reviewed these laboratory results:    Basic Metabolic Panel  23-Aug-2023 08:31:00      Result Value    Lab Comment:  POTASSIUM, UREA CALLED RB TO RAN CARTER, 08/23/2023 10:45    Glucose, Serum  71   L   NA  134   L   K  6.7   HH   CL  89   L   Bicarbonate, Serum  22    Anion Gap, Serum  30   H   BUN  118   HH   CREAT  8.53   H   GFR Female  5   A   Calcium, Serum  11.0   H     Renal Function Panel  22-Aug-2023 23:19:00      Result Value    Glucose, Serum  76    NA  134   L   K  5.8   H   CL  89   L   Bicarbonate, Serum  25    Anion Gap, Serum  26   H   BUN  113   H   CREAT  8.23   H   GFR Female  5   A   Calcium, Serum  11.2   H   Phosphorus, Serum  8.6   H   ALB  4.8        Assessment and Plan:        Additional Dx:   Abnormal electrocardiogram: Onset Date: 16-Feb-2023, Entered  Date: 16-Feb-2023 08:07   Shortness of breath at rest: Onset Date: 10-Mar-2020, Entered  Date: 10-Mar-2020 09:46   Atypical chest pain: Entered Date: 28-Jan-2020 14:41   Tachycardia: Entered Date: 16-Jan-2020 07:44   Acute respiratory failure with hypoxia: Entered Date: 15-Laz-2020  06:46   Hyperkalemia: Entered Date: 15-Laz-2020 06:46   Fluid overload: Entered Date: 15-Laz-2020 06:46   Respiratory failure: Entered Date: 13-Nov-2019 03:17   Diastolic heart failure secondary to hypertension: Entered  Date: 27-Oct-2019 03:29   Hypertensive crisis: Entered Date: 27-Oct-2019 03:28   Pre-operative exam: Entered Date: 12-Sep-2019 13:28   Pre-operative exam: Entered Date: 12-Sep-2019 13:28   Hypertensive heart disease without heart failure: Entered Date:  23-Nov-2018 10:26   End-stage renal disease: Entered Date: 23-Nov-2018 10:26   Hypertensive heart disease with heart failure: Entered Date:  23-Nov-2018 10:26   Acute on chronic diastolic congestive heart failure: Entered  Date: 23-Nov-2018 10:26   Idiopathic pulmonary hemosiderosis: Entered Date: 21-Nov-2018  13:11   Troponin level elevated: Entered Date: 19-Nov-2018  12:15   PNA (pneumonia): Entered Date: 17-Nov-2018 18:21   Dependence on renal dialysis: Entered Date: 16-Jun-2018 11:33   Non-ST elevation myocardial infarction (NSTEMI), type 2: Entered  Date: 15-Mayank-2018 15:41   Abnormal EKG: Onset Date: 06-Mar-2018, Entered Date: 06-Mar-2018  11:34   Infection due to Streptococcus pneumoniae: Onset Date: 16-Feb-2018,  Entered Date: 16-Feb-2018 18:40   Hemoptysis: Onset Date: 16-Feb-2018, Entered Date: 16-Feb-2018  18:40   Abnormal chest CT: Onset Date: 16-Feb-2018, Entered Date: 16-Feb-2018  18:39   Acute on chronic respiratory failure: Onset Date: 16-Feb-2018,  Entered Date: 16-Feb-2018 18:39   Chronic respiratory failure: Onset Date: 16-Feb-2018, Entered  Date: 16-Feb-2018 00:02   ESRD (end stage renal disease) on dialysis: Entered Date: 09-Feb-2018  22:25   History of hemoptysis: Entered Date: 08-Feb-2018 17:46   Hypertensive heart disease without heart failure: Entered Date:  31-Jan-2018 21:11   End-stage renal disease: Entered Date: 31-Jan-2018 21:11   Diarrhea: Entered Date: 29-Jan-2018 11:00   Sepsis: Entered Date: 29-Jan-2018 11:00   HCAP (healthcare-associated pneumonia): Entered Date: 29-Jan-2018  10:59   Diffuse pulmonary alveolar hemorrhage: Entered Date: 04-Dec-2017  07:07   Hypertensive heart disease without heart failure: Entered Date:  26-Jul-2017 19:35   Encounter for preadmission testing: Onset Date: 09-Feb-2017,  Entered Date: 13-Feb-2017 08:52   Encounter for other preprocedural examination: Entered Date:  13-Dec-2016 15:54       Medical History:   CHF, acute on chronic: Entered Date: 15-Nov-2019 08:58   ESRD (end stage renal disease) on dialysis: Entered Date: 15-Nov-2019  08:58   Volume overload: Entered Date: 15-Nov-2019 08:58   Current nonadherence to medical treatment: Entered Date: 13-Nov-2019  06:09   CHF (congestive heart failure): Onset Date: 30-Jan-2018, Entered  Date: 30-Jan-2018 15:45   Anemia: Entered Date: 30-Nov-2017 20:07   Pulmonary  hemorrhage: Entered Date: 30-Nov-2017 19:49   Hypothyroidism: Entered Date: 26-Jul-2017 01:23   COPD (chronic obstructive pulmonary disease): Entered Date:  26-Jul-2017 01:22   Hypertension: Entered Date: 26-Jul-2017 01:22   ESRD (end stage renal disease) on dialysis: Entered Date: 26-Jul-2017  01:22       Surg History:   Tachycardia: Onset Date: 14-Nov-2019, Entered Date: 14-Nov-2019  09:57   Shortness of breath at rest: Onset Date: 28-Oct-2019, Entered  Date: 28-Oct-2019 10:55   Chest pain: Onset Date: 29-Nov-2017, Entered Date: 29-Nov-2017  10:18   Presence of surgically created primary arteriovenous shunt for hemodialysis : Entered Date: 26-Jul-2017 01:22    Code Status:  ·  Code Status Full Code       Impression 1: End-stage renal disease   Plan for Impression 1: Continue dialysis with low  potassium bath   Impression 2: Noncompliance   Plan for Impression 2: We will continue to ascertain  the need to have complete dialysis prescription fulfill   Impression 3: Anemia renal disease   Impression 4: Hyperkalemia   Plan for Impression 4: Continue Lokelma and low potassium  dialysate   Impression 5: Hypercalcemia   Plan for Impression 5: Continue current dose of Cinacalcet       Electronic Signatures:  Daren Bocanegra)  (Signed 23-Aug-2023 14:43)   Authored: Service, Subjective Data, Objective Data, Assessment  and Plan, Note Completion      Last Updated: 23-Aug-2023 14:43 by Daren Bocanegra)

## 2023-09-30 NOTE — PROGRESS NOTES
Service: Vascular Surgery     Subjective Data:   VASQUEZ PAK is a 53 year old Female who is Hospital Day # 2.    Additional Information:    Hypoglycemic overnight to 40s, improved to 80s with dextrose. Complaining of pain in R arm    Objective Data:     Objective Information:        T   P  R  BP   MAP  SpO2   Value  36.8  102  14  117/56   76  94%  Date/Time 9/23 13:14 9/23 14:28 9/23 13:14 9/23 14:28  9/23 6:12 9/23 14:28  Range  (36.2C - 36.8C )  (73 - 122 )  (13 - 18 )  (89 - 157 )/ (43 - 62 )  (71 - 86 )  (93% - 100% )   As of 23-Sep-2023 13:14:00, patient is on 5 L/min of oxygen via nasal cannula.    Physical Exam by System:    Constitutional: Thin, AA female. Somnolent   Eyes: Sclera clear   Respiratory/Thorax: Breathing comfortable on room  air   Cardiovascular: RRR   Gastrointestinal: Soft, nontender   Extremities: R arm with ace wrap bandage overlying  exposed graft, no active bleeding, hemostatic stitches in place. Mild R forearm and hand swelling and so wrap extended to R forearm and loosened. Palpable radial pulses   Skin: Warm, dry     Medication:    Medications:          Continuous Medications       --------------------------------    1. Dextrose 10% in Water Infusion:  500  mL  IntraVenous  <Continuous>         Scheduled Medications       --------------------------------    1. Levothyroxine:  175  microgram(s)  Oral  Daily    2. Linezolid 600 mg IVPB/ Premixed Soln 300 mL:  300  mL  IntraVenous Piggyback  Every 12 Hours    3. Piperacillin - Tazobactam 2.25 grams/Iso-osmotic 50 mL Premix IVPB:  50  mL  IntraVenous Piggyback  Every 8 Hours    4. Vancomycin - RPh to Dose - IV Piggy Back:  1  each  As Specified  Variable         PRN Medications       --------------------------------    1. Acetaminophen:  650  mg  Oral  Every 4 Hours    2. Albuterol 2.5 mg - Ipratropium 0.5 mg/ 3 mL Neb Soln:  3  mL  Inhalation  4 Times a Day    3. Dextrose 50% in Water Injectable:  12.5  gram(s)   IntraVenous Push  Every 15 Minutes    4. Dextrose 50% in Water Injectable:  25  gram(s)  IntraVenous Push  Every 15 Minutes    5. Glucagon Injectable:  1  mg  IntraMuscular  Every 15 Minutes    6. Melatonin:  3  mg  Oral  Daily 1800    7. oxyCODONE Immediate Release:  5  mg  Oral  Every 4 Hours    8. Sodium Chloride 0.9% Injectable Flush:  10  mL  IntraVenous Flush  Every 8 Hours and as Needed        Recent Lab Results:    Results:        I have reviewed these laboratory results:    Comprehensive Metabolic Panel  23-Sep-2023 03:10:00      Result Value    Lab Comment:  CRIT GLU CALLED RB TO SARA NOGUERA., 09/23/2023 04:50    Glucose, Serum  46   LL   NA  139    K  5.0    CL  100    Bicarbonate, Serum  32    Anion Gap, Serum  12    BUN  10    CREAT  1.94   H   GFR Female  30   A   Calcium, Serum  8.5   L   ALB  2.6   L   ALKP  101    T Pro  5.5   L   T Bili  0.8    Alanine Aminotransferase, Serum  10    Aspartate Transaminase, Serum  32      Complete Blood Count  Trending View      Result 23-Sep-2023 03:10:00  22-Sep-2023 17:30:00  22-Sep-2023 10:50:00    White Blood Cell Count 4.7   7.5   3.4   L    Nucleated Erythrocyte Count 0.0   0.0      Red Blood Cell Count 2.74   L   2.61   L   2.15   L    HGB 7.9   L   7.4   L   6.2   LL    HCT 26.7   L   24.9   L   20.6   L    MCV 97   95   96    MCHC 29.6   L   29.7   L   30.1   L    PLT 67   L   134   L   102   L    RDW-CV 18.0   H   18.1   H   17.9   H    Lab Comment:     HGB CALLED TO MERLIN SCHNEIDER , 09/22/2023 12:10        Magnesium, Serum  23-Sep-2023 03:10:00      Result Value    Magnesium, Serum  2.12      Basic Metabolic Panel  22-Sep-2023 17:30:00      Result Value    Lab Comment:  GLU CALLED RB TO SHI CALVIN, 09/22/2023 19:04    Glucose, Serum  37   LL   NA  138    K  4.6    CL  102    Bicarbonate, Serum  26    Anion Gap, Serum  15    BUN  8    CREAT  1.47   H   GFR Female  42   A   Calcium, Serum  8.5   L       Assessment and Plan:   Daily Risk Screen:  ·   Does patient have an indwelling urinary catheter? n/a consulting service   ·  Does patient have a central line? n/a consulting service     Comorbidities:  ·  Comorbidity Other     Code Status:  ·  Code Status Full Code     Assessment:    53F PMHx COPD, ESRD, HFpEF admitted as transfer from [facility] following episode of bleeding from RUE AVG at dialysis. Exposed graft, large skin defect. Graft ligated  by IR at [facility] and transferred to [facility].    Tentative plan for operative resection of exposed graft today, however patient persistently hypoglycemic with NPO, somnolent, intermittent tachycardia/hypotension likely related to hypoglycemia and therefore OR canceled for today. No urgent need for HD  based on labs    Plan:  Renal diet  D10 infusion. Accuchecks q4  Continue broad spectrum abx  Nephrology following for assessment of HD needs. If patient were to need HD urgently, would need ICU admission and temporary catheter placed as patient currently has no HD access  Tentative plan for OR 9/26 with Dr. Wu for resection of RUE AVG, placement of temporary dialysis catheter    Discussed with Dr. Jazmin Blas MD  PGY5 - Integrated Vascular Surgery  Vascular Team Pager - [phone]  Personal Pager - [phone]  Also available on [employer] [facility]      Attestation:   Note Completion:        Electronic Signatures:  Kamaljit Wu)  (Signed 24-Sep-2023 19:47)   Authored: Assessment and Plan, Note Completion   Co-Signer: Service, Subjective Data, Objective Data, Assessment and Plan, Note Completion  Dmitriy Blas (Resident))  (Signed 23-Sep-2023 15:10)   Authored: Service, Subjective Data, Objective Data, Assessment  and Plan, Note Completion      Last Updated: 24-Sep-2023 19:47 by Kamaljit Wu)

## 2023-09-30 NOTE — PROGRESS NOTES
Subjective Data:   ID Statement:  VASQUEZ PAK is a 53 year old Female who is Hospital Day # 6 and ICU Day #2.     Patient is lethargic and oriented 0-1 at different points of the day. Per nurse, patient was complaining of pain this morning. Upon seeing the patient today, she asked if she could get dialysis today.    No new complaints.    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 Who initially presented to [facility] ED on 9/2/23 and admitted to ICU with AHRF/Septic shock with LLL PNA requiring  pressor support and intubation. She had only been receiving half dialysis sessions the few days prior to presentation and required CVVH while in ICU. Blood cultures ½ sets growing actenobacter baumannii for which ID was consulted and suspected 2/2  PNA vs sacral wounds and recommended switching to Cefepime to complete 14 day course through 9/16/23. Hospital course c/b thrombocytopenia for which PF4 was negative. Found to have LUE DVT via U/S 9/10 L axillary vein extending into brachial vein started  on eliquis. ECHO showing worsening heart failure now with mrEF 45-50% compared to 60-65% in 2020 with worsening elevation of RVSP and stable valvular disease. On 9/11/23 patient left AMA but was SOB upon trying to get to her car and represented to the  ED and was readmitted to the SDU then to [facility]. On 9/22 while at dialysis her RUE AVF started bleeding profusely and IR was consulted and found exposed graft and controlled bleeding with sutures and compression. Patient was subsequently transferred to [facility]  on 9/22/23 for vascular surgery evaluation.    Since being at [facility] on 9/22/23 has been under vascular surgery team which had initially planned for explant of AVF graft on 9/23 however had eaten something in AM of 9/23 thus surgery was canceled. She was also found to be hypoglycemic, hypotensive  and  tachycardic and transferred to CTICU under SICU service care. D10 infusion improved hypoglycemia. Was transfused 1u uncrossed PRBCs for Hgb 6.3 and subsequently found to have blood antibodies and surgery has been further pushed back to Tuesday 9/26/23  while awaiting blood from the [place].     On 9/25, melena with Hgb was noted. GI consulted, EGD performed revealing coffee ground gastric content as well as esophagitis and gastritis. Recommended PPI IV BID. Holding off on uncrossed transfusions unless patient becomes unstable. ID consulted for  atb management in the setting of recent bacteremia, atb allergies and possible infected AV graft. ACS and wound care consulted for large surface area sacral DTI. Patient was planned to return to OR 9/26 with vascular surgery for AV graft revision and  ACS for sacral wound debridement. Repeat TTE obtained and is significant for severe tricuspid regurge and RVSP of 143.7.    Patient was transferred to stepdown unit and then ICU due to unresponsive episode with hypotension, RR<8, hypoxia and blood sugar of 47. Patient required levophed, midodrine, stress steroids, and D10 infusion. Patient also received crossed 2u pRBC with  another 2u pRBC available for future procedures. Patient stabilized but surgery postponed due to status of fluid overload. Temporary HD line placed 9/26 for patient to receive dialysis on the 26th and 27th with planned OR date of the 28th. Patient's hypotension  has since resolved though she remains lethargic and AOx1. Attempting to place dobhoff today (9/27) for enteric feeding.    -> Cardiology recommends repeat TTE once patient is euvolemic to better assess tricuspid regurge, RHC to assess pulm HTN, and, once optimized, a cardiac MRI to rule out infiltrative disease.    -> ID has discontinued linezolid due to thrombocytopenia while keeping patient on Zosyn. Patient also to receive daptomycin every 48 hours on dialysis days. Must monitor CK weekly  while on dapto.    Objective Data:     ·  Objective Information      T   P  R  BP   MAP  SpO2   Value  36.7  92  17  157/54   70  99%  Date/Time 9/27 12:00 9/27 12:00 9/27 12:00 9/26 20:52  9/26 12:00 9/27 12:00  Range  (36.1C - 36.8C )  (80 - 98 )  (10 - 33 )  (109 - 173 )/ (19 - 62 )  (43 - 70 )  (77% - 100% )   As of 27-Sep-2023 12:00:00, patient is on 4 L/min of oxygen via nasal cannula with humidification.      Pain reported at 9/26 16:00: 0  Pain reported at 9/27 12:00: 4  Pain reported at 9/27 2:00: 9 = Severe      Direct Arterial Blood Pressure  Systolic 166 (112 - 175) 9/27 12:00  Diastolic (mm Hg) 60 (30 - 131) 9/27 12:00  Mean (mm Hg) 105 (54 - 142) 9/27 12:00  Pulse Pressure (mm Hg) 106 (16 - 117) 9/27 12:00      ---- Intake and Output  -----  Mn/Dy/Year Time  Intake   Output  Net  Sep 27, 2023 6:00 am  530   0  530  Sep 26, 2023 10:00 pm  605   2400  -1795  Sep 26, 2023 2:00 pm  1090   0  1090    The Intake and Output Totals for the last 24 hours are:      Intake   Output  Net      2225   2400  -175    Physical Exam Narrative:  ·  Physical Exam:    Constitutional: Cachectic. Lethargic. A&Ox1. Appears stated age. In NAD.   HEENT: NC/AT, EOMI, clear sclera, moist mucous membranes. Wound of right earlobe.  CV: RRR, Grade 3 systolic murmur  PULM: CTAB, wheezing bilaterally, on 8L NC  ABDOMEN: Soft, NT/ND. No TTP. + BSx4.  SKIN: Normal Color, Warm, Dry, No Rashes, sacral pressure ulcer  EXTREMITIES: Non-Tender, Full ROM, No Pedal Edema, RUE edematous but not firm, weak RUE pulse, RUE graft site wrapped in ace bandage  NEURO: A&O x 1, nonfocal neurological exam.  PSYCH: Normal Mood & Behavior     Allergies:       Allergies:  ·  vancomycin : Resp Distress, Itching  ·  carvedilol : Unknown  ·  doxazosin : Unknown  ·  benazepril : Unknown    Recent Lab Results:    Results:    CBC: 9/27/2023 02:29              \     Hgb     /                              \     9.6 L    /  WBC  ----------------  Plt                10.0       ----------------    49 L            /     Hct     \                              /     29.0 L    \            RBC: 3.29 L    MCV: 88           CMP: 9/27/2023 02:29  NA+        Cl-     BUN  /                         134 L   98    14  /  --------------------------------  Glucose                ---------------------------  109 H    K+     HCO3-   Creat \                         3.8    27    1.73 H \           \  T Bili  /                    \  2.1 H /  AST  x ---- x ALT        5 Lx ---- x 6 L         /  Alk P   \               /  157 H \  Calcium : 9.0     Anion Gap : 13     Albumin : 3.1 L    T Protein : 5.5 L           Coagulation: 9/27/2023 02:29  PT  /                    18.3 H /  -------<    INR          ----------<      1.6 H  PTT\                    41 H \                         Results:        Impression:    1.  Interval placement of right IJ central axis catheter with tip  terminating in distal SVC.  2. Patchy interstitial airspace opacities are diffusely worsened,  most notably in the right lower lobe. Findings are concerning for  worsening pulmonary edema or infectious process such as multifocal  pneumonia.      Xray Chest 1 View [Sep 26 2023  4:54PM]      Assessment and Plan:   Daily Risk Screen:  ·  Does patient have a central line? yes   ·  Central Line Type dialysis / plasmapheresis   ·  Plan for dialysis / plasmapheresis catheter removal today? no   ·  The patient continues to require dialysis / plasmapheresis catheter access for dialysis / hemapheresis   ·  Does patient have an indwelling urinary catheter? no   ·  Is the patient intubated? no     Assessment/Plan:  ·  Assessment/Plan:    TUCKERKUSUMMATHEWS VASQUEZRAYA FERRO is a 53 year old Female with PMH ESRD on HD MWF via RUE AVF, COPD on home O2 6L, HFmrEF 45-50%, pulmonary hemosiderosis, and HTN presented  from [facility] on 9/22/2023 for bleeding RUE AVG. Pt had HD at OSH on 9/22 and had complete session. Towards end of HD session, RUE  AVG was bleeding excessively. Graft was ligated on 9/22 and pt transferred to [facility] to have graft removed. On 9/23, pt  was scheduled for OR graft explant but case cancelled as pt was hypoglycemic, hypotensive, and altered so transferred to CTICU on 9/23.    Neuro  #Acute Encephalopathy, unknown etiology.  D/DX: infection, renal failure.  Chronic Pain  - OARSS reviewed, Last script for T#3; OD Risk score 160.   - A&Ox1-2, somnolent, ?expressive aphasia  - 9/18: CT Head: no acute hemorrhage or stroke  - previously on Gabapentin which was stopped at [facility] due to encephalopathy    #Pain 2/2 sacral pressure ulcer  - PRN 0.4 IV Dilaudid for pain    Cardiac  #HFpEF  #Tricuspid regurg  #R sided heart failure  #HTN  #HLD  #Hypotension-- sepsis vs hemorrhagic shock - resolved  - TTE 9/6 EF 45-50%, low normal RV function, mod LVH, LA severely dilated, RVSP 66mmHg  - Repeat Echo 9/25: LVEF 75-80%, RV volume and pressure overload, reduced RV systolic function, RV mildly enlarged, RVSP 143.7, Mod AV regurg, Mod MV regurg, Severe TV regurg, significantly restricted TV septal leaflet mobility with pseudo prolapse of  the anterior leaflet  - A.M. cortisol 15.1  - Cardiology: repeat echo once patient euvolemic to better assess tricuspid regurge, RHC to assess pulm HTN, and, once optimized, a cardiac MRI to rule out infiltrative disease.  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 Coffee grounds in esophagus.  Scattered moderate inflammation and scattered hematin was found in the  stomach. No signs of  active bleeding observed. No bleeding lesions. The examined duodenum showed erosive peptic duodenitis in setting of melanosis, mainly deep erosions but no vessel or pigmented spots.  - BID PPI  - Patient too agitated yesterday to place dobhoff, attempt again today    Renal  #ESRD on HD (MWF)  - Nonfunctioning RUE AVG  - Last HD 9/22 at [facility]  - plan for RUE AVG extraction in OR after dialysis.  - RFP daily  - Vit D 15  - received temp HD cath  - HD yesterday and today    Heme  #Anemia of Chronic disease  #Acute blood loss anemia  #Thrombocytopenia  #recent hx of L axillary and brachial DVT  - received apixiban in [facility], holding now  - Several antibodies present, [place] assisting with appropriate cross match.  - previous admit, Anti PF4 Negative on 9/5  - Transfused 2 units yesterday  - holding AC in setting thrombocytopenia  - stopped linezolid in setting of thrombocytopenia  - gave 2mg Vit K 9/26, INR now 1.6  - SCD's for DVT prophylaxis    ID  #Sepsis, improved  2/2#Sacral pressure ulcer vs #Exposed LUE AV graft  - Recently treated for Actinobacter (+BC 9/2, pan sens) bacteremia   - Completed 14 day course of zosyn 9/3-9/5 and cefepime 9/5-9/16.  - 9/23 BC x1 NGTD  - 9/25 BC X2 obtained  - Procalc 4.77 -> 4.23  - allergic to Vanc  - ID: Linezolid (9/23- 9/26), Zosyn (9/23-- current), daptomycin every 48 hrs after dialysis (9/27 - current)  - HIV, Hep = negative  - Bacterial and Fungal cx for intra-op specimens ordered    Endo  #Hypoglycemia  2/2 from infection/possible sepsis  - Q2H blood sugars  - Levothyroxine 7.5 mcg IV Daily (175 mcg oral equivalent)  - insert dobhoff for feeding    Wounds  #Stage 3-4 sacral pressure ulcer (size: 7cm x 9.5cm x 1.5cm)  - seen by wound care RN  - Aggressive turning q2 hours side-to-side to offload sacrum. Cleanse and redress sacral wound DAILY using nickel-thick layer of Santyl (collagenase) ointment, then piece of Aquacel Ag cut to fit; secure with  Mepilex foam. Optimize nutrition.  - ACS plan to debride sacral wound in OR on Thursday (9/28)    #LUE AV Graft exposure  - Vasc Surgery to explant LUE AV graft on Thursday (9/28)  - Santyl to wound bed with dressing changes daily    Access: Temp HD line Rt IJ (9/26), L Brachial A-line (9/23), LUE PIV, L foot PIV    Fluids: IV bolus as needed, ESRD  Electrolytes: replete as needed  Nutrition: enteral feeding via dobhoff  DVT PPx: holding due to concern for bleed  GI PPx: Protonix BID  Abx: Linezolid (9/23- current), Zosyn (9/23-- current),  Access: Temp HD line Rt IJ (9/26), L Brachial A-line (9/23), LUE PIV, L foot PIV  Code Status: DNR/DNI    Dispo: Pt is a 52yo F w/ pmhx of ESRD on dialysis MWF, COPD on 6L baseline, HFrEF, being treated for encephalopathy, septic vs hemorrhagic shock, and hypoglycemia. Plan for additional dialysis and then OR for sacral wound and exposed AV graft (9/28).  Vasc surgery, cardiology, ID, and palliative consulted. Patient stable for transfer to floor.    Norman Gregorio DO  Internal Medicine PGY1    Code Status:  ·  Code Status DNAR with Added Limitations     Attestation:   Note Completion:  I am a:  Resident/Fellow   Attending Attestation I saw and evaluated the patient.  I personally obtained the key and critical portions of the history and physical exam or was physically present for key and  critical portions performed by the resident/fellow. I reviewed the resident/fellow?s documentation and discussed the patient with the resident/fellow.  I agree with the resident/fellow?s medical decision making as documented in the note.     I personally evaluated the patient on 27-Sep-2023   Critical Care Patient I have reviewed and evaluated the most recent data and results, personally examined the patient, and formulated the plan of care as presented above.  This patient  was critically ill and required continued critical care treatment. Teaching and any separately billable procedures are not  included in the time calculation.    Billing Provider Critical Care Time 35 minute(s)   Primary Critical Care Issue/Treatment (See Assessment and Plan for greater detail) -- This patient has impending or acute respiratory failure. We are treating with appropriate medications, ventilatory and/or oxygenating support, as indicated,  as well as intensive monitoring. Please see assessment and plan above for greater detail.; -- This patient is believed to have significant acute or end-stage renal failure requiring careful monitoring of fluid and respiratory status, including intensive  treatment with appropriate medications or dialysis, as indicated. Please see assessment and plan above for greater detail.; -- This patient is believed to have significant heart failure requiring careful monitoring of fluid and respiratory status, including  intensive treatment with cardiovascular medications or devices, as indicated. Please see assessment and plan above for greater detail.         Electronic Signatures:  Yang Quick)  (Signed 29-Sep-2023 23:35)   Authored: Note Completion   Co-Signer: Subjective Data  Norman Gregorio (DO (Resident))  (Signed 27-Sep-2023 15:38)   Authored: Subjective Data, Objective Data, Assessment  and Plan, Note Completion      Last Updated: 29-Sep-2023 23:35 by Yang Quick)

## 2023-09-30 NOTE — PROGRESS NOTES
"      Service: Nephrology     Subjective Data:   VASQUEZ PAK is a 53 year old Female who is Hospital Day # 4.    Additional Information:    Seen and examined   Plan for OR tomorrow for graft removal and temp HD cath placement     Objective Data:     Objective Information:      T   P  R  BP   MAP  SpO2   Value  36.1  97  20  157/44   74  72%  Date/Time 9/25 8:00 9/25 10:00 9/25 10:00 9/24 5:00  9/24 5:00 9/25 10:00  Range  (36.1C - 36.5C )  (90 - 109 )  (11 - 27 )  (128 - 173 )/ (44 - 128 )  (71 - 141 )  (72% - 100% )   As of 25-Sep-2023 04:00:00, patient is on 3 L/min of oxygen via nasal cannula.      Pain reported at 9/25 4:00: 0  Pain reported at 9/24 18:00: unable to assess;  pt \"feels better\"    ---- Intake and Output  -----  Mn/Dy/Year Time  Intake   Output  Net  Sep 25, 2023 6:00 am  370   0  370  Sep 24, 2023 10:00 pm  430   0  430  Sep 24, 2023 2:00 pm  240   0  240    The Intake and Output Totals for the last 24 hours are:      Intake   Output  Net      1040   null  null    Physical Exam by System:    Constitutional: no acute distress, slightly confused,  frail appearance   ENMT: nc   Respiratory/Thorax: no labored breathing, CTA B/L,  5L nc   Cardiovascular: RRR   Gastrointestinal: soft non-tender   Genitourinary: No solomon   Extremities: no leg edema  RUE AVG - wrapped in dressing   Neurological: alert and oriented x1, follows some  commands, lethargic   Psychological: reduced affect     Medication:    Medications:          Continuous Medications       --------------------------------    1. Dextrose 10% in Water Infusion:  500  mL  IntraVenous  <Continuous>         Scheduled Medications       --------------------------------    1. Albumin 25% IV Bolus:  25  gram(s)  IntraVenous Piggyback  Every 6 Hours    2. Levothyroxine:  175  microgram(s)  Oral  Daily    3. Melatonin:  3  mg  Oral  Daily 1800    4. Midodrine:  10  mg  Oral  Every 8 Hours    5. Pantoprazole Injectable:  40  mg  " IntraVenous Push  Every 12 Hours    6. Piperacillin - Tazobactam 2.25 grams/Iso-osmotic 50 mL Premix IVPB:  50  mL  IntraVenous Piggyback  Every 8 Hours         PRN Medications       --------------------------------    1. Acetaminophen:  650  mg  Oral  Every 4 Hours    2. Albuterol 2.5 mg - Ipratropium 0.5 mg/ 3 mL Neb Soln:  3  mL  Inhalation  4 Times a Day    3. Calcium Gluconate 1 gram/ NaCL 0.67% 50 mL Premix IVPB:  50  mL  IntraVenous Piggyback  Every 6 Hours    4. Calcium Gluconate 2 gram/ NaCL 0.67% 100 mL Premix IVPB:  100  mL  IntraVenous Piggyback  Every 6 Hours    5. Dextrose 50% in Water Injectable:  25  gram(s)  IntraVenous Push  Every 15 Minutes    6. Dextrose 50% in Water Injectable:  12.5  gram(s)  IntraVenous Push  Every 15 Minutes    7. Glucagon Injectable:  1  mg  IntraMuscular  Every 15 Minutes    8. Magnesium Sulfate 2 gram/Sterile Water 50 mL Premix Soln:  2  gram(s)  IntraVenous Piggyback  Every 6 Hours    9. Magnesium Sulfate 4 gram/Sterile Water 100 mL Premix Soln:  4  gram(s)  IntraVenous Piggyback  Every 6 Hours    10. Melatonin:  3  mg  Oral  Once    11. Ondansetron Injectable:  4  mg  IntraVenous Push  Every 6 Hours    12. oxyCODONE Immediate Release:  5  mg  Oral  Every 4 Hours    13. Sodium Chloride 0.9% Injectable Flush:  10  mL  IntraVenous Flush  Every 8 Hours and as Needed         Conditional Medication Orders       --------------------------------    1. Perflutren Lipid Microsphere (Activated) 1.3 mL / NaCL 0.9% T.V. 10 mL Injectable:  0.5  mL  IntraVenous Push  Once      Recent Lab Results:    Results:    CBC: 9/25/2023 04:24              \     Hgb     /                              \     7.2 L    /  WBC  ----------------  Plt               18.2 H    ----------------    97 L            /     Hct     \                              /     20.6 L    \            RBC: 2.33 L    MCV: 88           RFP: 9/25/2023 04:24  NA+        Cl-     BUN  /                         134 L   94 L     31 H /  --------------------------------  Glucose                ---------------------------  72 L    K+     HCO3-   Creat \                         4.3    29    3.57 H \  Calcium : 8.8Anion Gap : 15          Albumin : 2.7 L    Phos : 3.3      Assessment and Plan:   Comorbidities:  ·  Comorbidity Other     Code Status:  ·  Code Status Full Code     Assessment:    MELLISA VASQUEZ FERRO is a 53 year old Female with PMH ESRD on HD MWF via RUE AVF, COPD on home O2 6L, 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 Pt had HD at OSH on 9/22 and had complete session. Towards end of HD session, RUE AVG was bleeding excessively. Graft was ligated on 9/22 and pt transferred to [facility] to have graft removed. On 9/23, pt  was scheduled for OR graft explant but case cancelled as pt was hypoglycemic, hypotensive, and alterred so transferred to CT ICU on 9/23. Nephrology following for dialysis needs.    Impression  ESRD on HD MWF - previously with RUE AVG - now ligated; last HD was on 9/22 at OSH (completed most of her session)  RUE AVG bleeding - ligated 9/22 at OSH  - She is on 5L nc, home is 5-6L  - Off pressors     Recommendations:  -Noted plan for OR tomorrow for graft removal and placement of temporary dialysis catheter.   -Will plan for HD tomorrow after placement of TDC   -No need for RRT today  -Will follow       Shilo Yap MD   Nephrology fellow PGY 4     Attestation:   Note Completion:  I am a:  Resident/Fellow   Attending Attestation I saw and evaluated the patient.  I personally obtained the key and critical portions of the history and physical exam or was physically present for key and  critical portions performed by the resident/fellow. I reviewed the resident/fellow?s documentation and discussed the patient with the resident/fellow.  I agree with the resident/fellow?s medical decision making as documented in the note.     I personally  evaluated the patient on 25-Sep-2023         Electronic Signatures:  Shilo Yap (Fellow))  (Signed 25-Sep-2023 11:32)   Authored: Service, Subjective Data, Objective Data, Assessment  and Plan, Note Completion  Igor Waller)  (Signed 28-Sep-2023 12:50)   Authored: Note Completion   Co-Signer: Service, Subjective Data, Objective Data, Assessment and Plan, Note Completion      Last Updated: 28-Sep-2023 12:50 by Igor Waller)

## 2023-09-30 NOTE — PROGRESS NOTES
Service: Medicine     Subjective Data:   VASQUEZ PAK is a 53 year old Female who is Hospital Day # 7.     Patient was seen and examined at bedside. Patient was a bit agitated this morning. Sister stated patient has been confused now more than before.    Objective Data:     Objective Information:      T   P  R  BP   MAP  SpO2   Value  36.6  99  18  167/83   105  100%  Date/Time 9/28 16:18 9/28 16:18 9/28 16:18 9/28 16:18  9/28 8:45 9/28 16:18  Range  (36.2C - 37C )  (84 - 113 )  (11 - 29 )  (151 - 173 )/ (50 - 83 )  (90 - 105 )  (90% - 100% )   As of 28-Sep-2023 08:24:00, patient is on 5 L/min of oxygen via nasal cannula.  Highest temp of 37 C was recorded at 9/28 8:45      Pain reported at 9/27 16:00: 0  Pain reported at 9/28 15:34: 8 = Severe    ---- Intake and Output  -----  Mn/Dy/Year Time  Intake   Output  Net  Sep 28, 2023 2:00 pm  245   1  244  Sep 28, 2023 6:00 am  75   0  75  Sep 27, 2023 10:00 pm  355   2002  -1647    The Intake and Output Totals for the last 24 hours are:      Intake   Output  Net      710   2003  -1293      T   P  R  BP   MAP  SpO2   Value  36.6  99  18  167/83   105  100%  Date/Time 9/28 16:18 9/28 16:18 9/28 16:18 9/28 16:18  9/28 8:45 9/28 16:18  Range  (36.2C - 37C )  (84 - 113 )  (11 - 29 )  (151 - 173 )/ (50 - 83 )  (90 - 105 )  (90% - 100% )   As of 28-Sep-2023 08:24:00, patient is on 5 L/min of oxygen via nasal cannula.  Highest temp of 37 C was recorded at 9/28 8:45    Physical Exam Narrative:  ·  Physical Exam:    Constitutional: Cachectic. Lethargic. A&Ox1   HEENT: dry oral mucosa. Dobhoff in place   CV: RRR, Grade 3 systolic murmur  PULM: CTAB, wheezing bilaterally, on 4L NC  ABDOMEN: Soft, NT/ND. No TTP. + BSx4.  SKIN: Normal Color, Warm, Dry, No Rashes, sacral pressure ulcer  EXTREMITIES: Non-Tender, Full ROM, No Pedal Edema, RUE edematous but not firm, weak RUE pulse, RUE graft site wrapped in ace bandage  NEURO: A&O x 1, nonfocal neurological  exam.      Medication:    Medications:          Continuous Medications       --------------------------------  No continuous medications are active       Scheduled Medications       --------------------------------    1. Collagenase 250 Units/ gram Topical:  1  application(s)  Topical  Daily    2. DAPTOmycin IV Piggy Back:  350  mg  IntraVenous Piggyback  Every 48 Hours    3. Ergocalciferol (Vitamin D2) Oral Liquid:  8000  International Unit(s)  Oral  Daily    4. Levothyroxine Injectable:  87.5  microgram(s)  IntraVenous Push  Daily    5. Lidocaine 4% TransDermal:  1  patch  TransDermal  Every 24 Hours    6. Melatonin:  3  mg  Oral  Daily 1800    7. Pantoprazole Injectable:  40  mg  IntraVenous Push  Every 12 Hours    8. Piperacillin - Tazobactam 2.25 grams/Iso-osmotic 50 mL Premix IVPB:  50  mL  IntraVenous Piggyback  Every 8 Hours    9. Potassium Chloride Powder Packet:  40  mEq  Feeding tube  Daily    10. Thiamine IV Piggy Back:  500  mg  IntraVenous Piggyback  3 Times a Day         PRN Medications       --------------------------------    1. Acetaminophen:  650  mg  Oral  Every 4 Hours    2. Albuterol 2.5 mg - Ipratropium 0.5 mg/ 3 mL Neb Soln:  3  mL  Inhalation  4 Times a Day    3. Dextrose 50% in Water Injectable:  25  gram(s)  IntraVenous Push  Every 15 Minutes    4. Dextrose 50% in Water Injectable:  12.5  gram(s)  IntraVenous Push  Every 15 Minutes    5. Glucagon Injectable:  1  mg  IntraMuscular  Every 15 Minutes    6. HYDROmorphone Injectable:  0.4  mg  IntraVenous Push  Every 4 Hours    7. Ondansetron Injectable:  4  mg  IntraVenous Push  Every 6 Hours        Recent Lab Results:    Results:    CBC: 9/28/2023 05:08              \     Hgb     /                              \     8.5 L    /  WBC  ----------------  Plt               11.4 H    ----------------    59 L            /     Hct     \                              /     26.4 L    \            RBC: 2.90 L    MCV: 91           RFP: 9/28/2023  05:08  NA+        Cl-     BUN  /                         140    100    9  /  --------------------------------  Glucose                ---------------------------  80    K+     HCO3-   Creat \                         3.4 L   28    1.27 H  \  Calcium : 9.5Anion Gap : 15          Albumin : 3.2 L    Phos : 2.6        I have reviewed these laboratory results:    Complete Blood Count  28-Sep-2023 05:08:00      Result Value    White Blood Cell Count  11.4   H   Nucleated Erythrocyte Count  0.0    Red Blood Cell Count  2.90   L   HGB  8.5   L   HCT  26.4   L   MCV  91    MCHC  32.2    PLT  59   L   RDW-CV  16.2   H     Renal Function Panel  28-Sep-2023 05:08:00      Result Value    Glucose, Serum  80    NA  140    K  3.4   L   CL  100    Bicarbonate, Serum  28    Anion Gap, Serum  15    BUN  9    CREAT  1.27   H   GFR Female  50   A   Calcium, Serum  9.5    Phosphorus, Serum  2.6    ALB  3.2   L     Magnesium, Serum  28-Sep-2023 05:08:00      Result Value    Magnesium, Serum  1.91        Radiology Results:    Results:        Impression:    1.  Enteric tube unchanged in position. Nonobstructive bowel-gas  pattern.      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 Pt had HD at OSH on 9/22 and had complete session. Towards end of HD session, RUE AVG was bleeding excessively. Graft was ligated on 9/22 and pt transferred to [facility] to have graft removed. On 9/23, pt  was scheduled for OR graft explant but case cancelled as pt was hypoglycemic, hypotensive, and altered so transferred to CTICU on 9/23    PLAN:  1. Acute delirium in the setting of prolonged hospitalization, narcotics     1. continue to monitor       2. seroquel at bedtime as need it for sleep regulation     2. Right upper AVG bleeding/sacral ulcer      1. NPO after midnight for OR tomorrow       2. continue antibiotics 24 hours post op       3. blood cultures negative     3.. HFpEF/Tricuspid regurg/R sided heart failure      1. Appreciate cards recommendation-will follow up outpatient     4. Thrombocytopenia-multifactorial       1. stable        2. continue to monitor     5. HTN/HLD/Hypotension-- sepsis vs hemorrhagic shock        1. resolved     6. Chronic hypoxic respiratory failure/COPD on 6 L home O2      1. continue duonebs every 4 hours as need it for wheezing     7. Severe Protein Calorie Malnutrition/Grade D Esophagitis      1. 9/25 EGD: Moderately severe LA Grade D esophagitis with circumferential ulceration and exudate not actively bleeding was found. Coffee grounds in esophagus.  Scattered moderate inflammation and scattered hematin was found in the stomach. No signs  of active bleeding observed. No bleeding lesions. The examined duodenum showed erosive peptic duodenitis in setting of melanosis, mainly deep erosions but no vessel or pigmented spots.    8. ESRD on HD (MWF)      1. Last HD 9/22 at [facility]    9. Sepsis      1. Improved     10. Hypoglycemia       1. resolved       Disposition: Patient will go to OR tomorrow with ACS and vascular. Anticipated discharge > 2 days     DOC HALO GEORGETTE MASSEY       Electronic Signatures:  Georgette Massey ()  (Signed 28-Sep-2023 21:03)   Authored: Service, Subjective Data, Objective Data, Assessment  and Plan, Note Completion      Last Updated: 28-Sep-2023 21:03 by Georgette Massey ()

## 2023-09-30 NOTE — PROGRESS NOTES
Service: Nephrology     Subjective Data:   VASQUEZ PAK is a 52 year old Female who is Hospital Day # 15.     Day #3 status post zoledronic acid infusion.  Patient still is anorexic with poor appetite  Denies any dyspnea or chest pain nausea or vomiting  Before dialysis tomorrow.    Objective Data:   Physical Exam by System:    Constitutional: Alert oriented  Asthenic habitus   Eyes: Sunken eyes pupils react to light and accommodation   ENMT: mucous membranes moist, no apparent injury,  no lesions seen   Head/Neck: Full range of motion no thyromegaly   Respiratory/Thorax: Bibasilar inspiratory crackles  and scattered expiratory wheezing anteriorly   Cardiovascular: 1/6 sternal border systolic murmur   Gastrointestinal: Active peristalsis no rebound or  guarding.  She had an NG tube from the left nostril   Genitourinary: No CVA tenderness   Musculoskeletal: Decreased muscle tone   Extremities: Edema 1+   Neurological: No tremors no asterixis   Breast: No masses, tenderness, no discharge or discoloration   Lymphatic: No significant lymphadenopathy   Psychological: Appropriate mood and behavior   Skin: Poor turgor     Assessment and Plan:        Additional Dx:   Abnormal electrocardiogram: Onset Date: 16-Feb-2023, Entered  Date: 16-Feb-2023 08:07   Shortness of breath at rest: Onset Date: 10-Mar-2020, Entered  Date: 10-Mar-2020 09:46   Atypical chest pain: Entered Date: 28-Jan-2020 14:41   Tachycardia: Entered Date: 16-Jan-2020 07:44   Acute respiratory failure with hypoxia: Entered Date: 15-Laz-2020  06:46   Hyperkalemia: Entered Date: 15-Laz-2020 06:46   Fluid overload: Entered Date: 15-Laz-2020 06:46   Respiratory failure: Entered Date: 13-Nov-2019 03:17   Diastolic heart failure secondary to hypertension: Entered  Date: 27-Oct-2019 03:29   Hypertensive crisis: Entered Date: 27-Oct-2019 03:28   Pre-operative exam: Entered Date: 12-Sep-2019 13:28   Pre-operative exam: Entered Date: 12-Sep-2019  13:28   Hypertensive heart disease without heart failure: Entered Date:  23-Nov-2018 10:26   End-stage renal disease: Entered Date: 23-Nov-2018 10:26   Hypertensive heart disease with heart failure: Entered Date:  23-Nov-2018 10:26   Acute on chronic diastolic congestive heart failure: Entered  Date: 23-Nov-2018 10:26   Idiopathic pulmonary hemosiderosis: Entered Date: 21-Nov-2018  13:11   Troponin level elevated: Entered Date: 19-Nov-2018 12:15   PNA (pneumonia): Entered Date: 17-Nov-2018 18:21   Dependence on renal dialysis: Entered Date: 16-Jun-2018 11:33   Non-ST elevation myocardial infarction (NSTEMI), type 2: Entered  Date: 15-Mayank-2018 15:41   Abnormal EKG: Onset Date: 06-Mar-2018, Entered Date: 06-Mar-2018  11:34   Infection due to Streptococcus pneumoniae: Onset Date: 16-Feb-2018,  Entered Date: 16-Feb-2018 18:40   Hemoptysis: Onset Date: 16-Feb-2018, Entered Date: 16-Feb-2018  18:40   Abnormal chest CT: Onset Date: 16-Feb-2018, Entered Date: 16-Feb-2018  18:39   Acute on chronic respiratory failure: Onset Date: 16-Feb-2018,  Entered Date: 16-Feb-2018 18:39   Chronic respiratory failure: Onset Date: 16-Feb-2018, Entered  Date: 16-Feb-2018 00:02   ESRD (end stage renal disease) on dialysis: Entered Date: 09-Feb-2018  22:25   History of hemoptysis: Entered Date: 08-Feb-2018 17:46   Hypertensive heart disease without heart failure: Entered Date:  31-Jan-2018 21:11   End-stage renal disease: Entered Date: 31-Jan-2018 21:11   Diarrhea: Entered Date: 29-Jan-2018 11:00   Sepsis: Entered Date: 29-Jan-2018 11:00   HCAP (healthcare-associated pneumonia): Entered Date: 29-Jan-2018  10:59   Diffuse pulmonary alveolar hemorrhage: Entered Date: 04-Dec-2017  07:07   Hypertensive heart disease without heart failure: Entered Date:  26-Jul-2017 19:35   Encounter for preadmission testing: Onset Date: 09-Feb-2017,  Entered Date: 13-Feb-2017 08:52   Encounter for other preprocedural examination: Entered Date:  13-Dec-2016  15:54       Medical History:   CHF, acute on chronic: Entered Date: 15-Nov-2019 08:58   ESRD (end stage renal disease) on dialysis: Entered Date: 15-Nov-2019  08:58   Volume overload: Entered Date: 15-Nov-2019 08:58   Current nonadherence to medical treatment: Entered Date: 13-Nov-2019  06:09   CHF (congestive heart failure): Onset Date: 30-Jan-2018, Entered  Date: 30-Jan-2018 15:45   Anemia: Entered Date: 30-Nov-2017 20:07   Pulmonary hemorrhage: Entered Date: 30-Nov-2017 19:49   Hypothyroidism: Entered Date: 26-Jul-2017 01:23   COPD (chronic obstructive pulmonary disease): Entered Date:  26-Jul-2017 01:22   Hypertension: Entered Date: 26-Jul-2017 01:22   ESRD (end stage renal disease) on dialysis: Entered Date: 26-Jul-2017  01:22       Surg History:   Tachycardia: Onset Date: 14-Nov-2019, Entered Date: 14-Nov-2019  09:57   Shortness of breath at rest: Onset Date: 28-Oct-2019, Entered  Date: 28-Oct-2019 10:55   Chest pain: Onset Date: 29-Nov-2017, Entered Date: 29-Nov-2017  10:18   Presence of surgically created primary arteriovenous shunt for hemodialysis : Entered Date: 26-Jul-2017 01:22    Code Status:  ·  Code Status Full Code       Impression 1: End-stage renal disease   Plan for Impression 1: Dialysis tomorrow at low calcium  bath   Impression 2: Hypercalcemia   Plan for Impression 2: Awaiting reports of serum  protein electrophoresis  Elevated PTH suggest secondary hyperparathyroidism  Immobility probably the culprit  Continue to monitor calcium/ionized calcium  Start patient Cinacalcet  Will repeat zoledronic acid dose if no downtrending calcium levels   Impression 3: Anemia renal disease   Plan for Impression 3: Continue current dose of DIAZ's  and iron   Impression 4: Anorexia   Plan for Impression 4: Continue Periactin   Impression 5: Protein calorie malnutrition   Plan for Impression 5: Continue protein supplements       Electronic Signatures:  Daren Bocanegra)  (Signed 10-Aug-2023  11:50)   Authored: Service, Subjective Data, Objective Data, Assessment  and Plan, Note Completion      Last Updated: 10-Aug-2023 11:50 by Daren Bocanegra)

## 2023-09-30 NOTE — PROGRESS NOTES
Service: Nephrology     Subjective Data:   VASQUEZ PAK is a 52 year old Female who is Hospital Day # 23.     Patient had dialysis.  Dialysis desires to be discharged today.  Attending physician  and myself discussed with patient  to stay at the hospital until accepted at the next facility.    Objective Data:   Physical Exam by System:    Constitutional: Alert oriented  Asthenic habitus   Eyes: pupils react to light and accommodation   ENMT: mucous membranes moist, no apparent injury,  no lesions seen   Head/Neck: Full range of motion no thyromegaly   Respiratory/Thorax: Minimal inspiratory rhonchi   Cardiovascular: 1/6 sternal border systolic murmur  no gallop no thrills or rubs   Gastrointestinal: Active peristalsis no rebound or  guarding.  She had an NG tube from the left nostril   Genitourinary: No CVA tenderness   Musculoskeletal: Decreased muscle tone   Extremities: There is a dark spot in the tip of the  index fingers suggestive of old embolic episode   Neurological: No tremors no asterixis   Breast: No masses, tenderness, no discharge or discoloration   Lymphatic: No significant lymphadenopathy   Psychological: Appropriate mood and behavior   Skin: Poor turgor     Assessment and Plan:        Additional Dx:   Abnormal electrocardiogram: Onset Date: 16-Feb-2023, Entered  Date: 16-Feb-2023 08:07   Shortness of breath at rest: Onset Date: 10-Mar-2020, Entered  Date: 10-Mar-2020 09:46   Atypical chest pain: Entered Date: 28-Jan-2020 14:41   Tachycardia: Entered Date: 16-Jan-2020 07:44   Acute respiratory failure with hypoxia: Entered Date: 15-Laz-2020  06:46   Hyperkalemia: Entered Date: 15-Laz-2020 06:46   Fluid overload: Entered Date: 15-Laz-2020 06:46   Respiratory failure: Entered Date: 13-Nov-2019 03:17   Diastolic heart failure secondary to hypertension: Entered  Date: 27-Oct-2019 03:29   Hypertensive crisis: Entered Date: 27-Oct-2019 03:28   Pre-operative exam: Entered Date:  12-Sep-2019 13:28   Pre-operative exam: Entered Date: 12-Sep-2019 13:28   Hypertensive heart disease without heart failure: Entered Date:  23-Nov-2018 10:26   End-stage renal disease: Entered Date: 23-Nov-2018 10:26   Hypertensive heart disease with heart failure: Entered Date:  23-Nov-2018 10:26   Acute on chronic diastolic congestive heart failure: Entered  Date: 23-Nov-2018 10:26   Idiopathic pulmonary hemosiderosis: Entered Date: 21-Nov-2018  13:11   Troponin level elevated: Entered Date: 19-Nov-2018 12:15   PNA (pneumonia): Entered Date: 17-Nov-2018 18:21   Dependence on renal dialysis: Entered Date: 16-Jun-2018 11:33   Non-ST elevation myocardial infarction (NSTEMI), type 2: Entered  Date: 15-Mayank-2018 15:41   Abnormal EKG: Onset Date: 06-Mar-2018, Entered Date: 06-Mar-2018  11:34   Infection due to Streptococcus pneumoniae: Onset Date: 16-Feb-2018,  Entered Date: 16-Feb-2018 18:40   Hemoptysis: Onset Date: 16-Feb-2018, Entered Date: 16-Feb-2018  18:40   Abnormal chest CT: Onset Date: 16-Feb-2018, Entered Date: 16-Feb-2018  18:39   Acute on chronic respiratory failure: Onset Date: 16-Feb-2018,  Entered Date: 16-Feb-2018 18:39   Chronic respiratory failure: Onset Date: 16-Feb-2018, Entered  Date: 16-Feb-2018 00:02   ESRD (end stage renal disease) on dialysis: Entered Date: 09-Feb-2018  22:25   History of hemoptysis: Entered Date: 08-Feb-2018 17:46   Hypertensive heart disease without heart failure: Entered Date:  31-Jan-2018 21:11   End-stage renal disease: Entered Date: 31-Jan-2018 21:11   Diarrhea: Entered Date: 29-Jan-2018 11:00   Sepsis: Entered Date: 29-Jan-2018 11:00   HCAP (healthcare-associated pneumonia): Entered Date: 29-Jan-2018  10:59   Diffuse pulmonary alveolar hemorrhage: Entered Date: 04-Dec-2017  07:07   Hypertensive heart disease without heart failure: Entered Date:  26-Jul-2017 19:35   Encounter for preadmission testing: Onset Date: 09-Feb-2017,  Entered Date: 13-Feb-2017 08:52   Encounter for  other preprocedural examination: Entered Date:  13-Dec-2016 15:54       Medical History:   CHF, acute on chronic: Entered Date: 15-Nov-2019 08:58   ESRD (end stage renal disease) on dialysis: Entered Date: 15-Nov-2019  08:58   Volume overload: Entered Date: 15-Nov-2019 08:58   Current nonadherence to medical treatment: Entered Date: 13-Nov-2019  06:09   CHF (congestive heart failure): Onset Date: 30-Jan-2018, Entered  Date: 30-Jan-2018 15:45   Anemia: Entered Date: 30-Nov-2017 20:07   Pulmonary hemorrhage: Entered Date: 30-Nov-2017 19:49   Hypothyroidism: Entered Date: 26-Jul-2017 01:23   COPD (chronic obstructive pulmonary disease): Entered Date:  26-Jul-2017 01:22   Hypertension: Entered Date: 26-Jul-2017 01:22   ESRD (end stage renal disease) on dialysis: Entered Date: 26-Jul-2017  01:22       Surg History:   Tachycardia: Onset Date: 14-Nov-2019, Entered Date: 14-Nov-2019  09:57   Shortness of breath at rest: Onset Date: 28-Oct-2019, Entered  Date: 28-Oct-2019 10:55   Chest pain: Onset Date: 29-Nov-2017, Entered Date: 29-Nov-2017  10:18   Presence of surgically created primary arteriovenous shunt for hemodialysis : Entered Date: 26-Jul-2017 01:22    Code Status:  ·  Code Status Full Code       Impression 1: End-stage renal disease   Plan for Impression 1: Continue current dialysis  prescription and low calcium bath   Impression 2: Hypercalcemia   Plan for Impression 2: Secondary to immobility and  secondary hyperparathyroidism of renal origin  Continue Cinacalcet and monitor calcium   Impression 3: Severe protein calorie malnutrition   Plan for Impression 3: Continue protein supplements   Impression 4: Anorexia   Plan for Impression 4: Continue cyproheptadine       Electronic Signatures:  Daren Bocanegra)  (Signed 18-Aug-2023 14:35)   Authored: Service, Subjective Data, Objective Data, Assessment  and Plan, Note Completion      Last Updated: 18-Aug-2023 14:35 by Daren Bocanegra)

## 2023-09-30 NOTE — PROGRESS NOTES
"      Service: Vascular Surgery     Subjective Data:   VASQUEZ PAK is a 53 year old Female who is Hospital Day # 4.     NAOE. Patient seen on rounds and sleeping comfortably.    Objective Data:     Objective Information:      T   P  R  BP   MAP  SpO2   Value  36.1  97  20  157/44   74  72%  Date/Time 9/25 8:00 9/25 10:00 9/25 10:00 9/24 5:00  9/24 5:00 9/25 10:00  Range  (36.1C - 36.5C )  (90 - 109 )  (11 - 27 )  (128 - 173 )/ (44 - 128 )  (71 - 141 )  (72% - 100% )   As of 25-Sep-2023 04:00:00, patient is on 3 L/min of oxygen via nasal cannula.      Pain reported at 9/25 4:00: 0  Pain reported at 9/24 18:00: unable to assess;  pt \"feels better\"    ---- Intake and Output  -----  Mn/Dy/Year Time  Intake   Output  Net  Sep 25, 2023 6:00 am  370   0  370  Sep 24, 2023 10:00 pm  430   0  430  Sep 24, 2023 2:00 pm  240   0  240    The Intake and Output Totals for the last 24 hours are:      Intake   Output  Net      1040   null  null    Physical Exam Narrative:  ·  Physical Exam:    Constitutional: Thin, AA female. Lying in bed.   Eyes: Sclera clear  Respiratory/Thorax: Breathing comfortably on 3L NC  Cardiovascular: RRR on moniotor  Gastrointestinal: Soft, nontender  Extremities: RUE with ace wrap overlying RUE AVG site; exposed PTFE graft with ligation sutures, no active bleeding. Palpable RUE radial pulse, warm hand  Skin: Large sacral decubitus ulceration with undermining  Fibrinous base with some granulation tissue, beige/grey drainage on packing, no bleeding, no purulence  See extremities      Medication:    Medications:          Continuous Medications       --------------------------------    1. Dextrose 10% in Water Infusion:  500  mL  IntraVenous  <Continuous>         Scheduled Medications       --------------------------------    1. Albumin 25% IV Bolus:  25  gram(s)  IntraVenous Piggyback  Every 6 Hours    2. Levothyroxine:  175  microgram(s)  Oral  Daily    3. Melatonin:  3  mg  Oral  Daily " 1800    4. Midodrine:  10  mg  Oral  Every 8 Hours    5. Pantoprazole Injectable:  40  mg  IntraVenous Push  Every 12 Hours    6. Piperacillin - Tazobactam 2.25 grams/Iso-osmotic 50 mL Premix IVPB:  50  mL  IntraVenous Piggyback  Every 8 Hours         PRN Medications       --------------------------------    1. Acetaminophen:  650  mg  Oral  Every 4 Hours    2. Albuterol 2.5 mg - Ipratropium 0.5 mg/ 3 mL Neb Soln:  3  mL  Inhalation  4 Times a Day    3. Calcium Gluconate 1 gram/ NaCL 0.67% 50 mL Premix IVPB:  50  mL  IntraVenous Piggyback  Every 6 Hours    4. Calcium Gluconate 2 gram/ NaCL 0.67% 100 mL Premix IVPB:  100  mL  IntraVenous Piggyback  Every 6 Hours    5. Dextrose 50% in Water Injectable:  25  gram(s)  IntraVenous Push  Every 15 Minutes    6. Dextrose 50% in Water Injectable:  12.5  gram(s)  IntraVenous Push  Every 15 Minutes    7. Glucagon Injectable:  1  mg  IntraMuscular  Every 15 Minutes    8. Magnesium Sulfate 2 gram/Sterile Water 50 mL Premix Soln:  2  gram(s)  IntraVenous Piggyback  Every 6 Hours    9. Magnesium Sulfate 4 gram/Sterile Water 100 mL Premix Soln:  4  gram(s)  IntraVenous Piggyback  Every 6 Hours    10. Melatonin:  3  mg  Oral  Once    11. Ondansetron Injectable:  4  mg  IntraVenous Push  Every 6 Hours    12. oxyCODONE Immediate Release:  5  mg  Oral  Every 4 Hours    13. Sodium Chloride 0.9% Injectable Flush:  10  mL  IntraVenous Flush  Every 8 Hours and as Needed         Conditional Medication Orders       --------------------------------    1. Perflutren Lipid Microsphere (Activated) 1.3 mL / NaCL 0.9% T.V. 10 mL Injectable:  0.5  mL  IntraVenous Push  Once      Recent Lab Results:    Results:    CBC: 9/25/2023 04:24              \     Hgb     /                              \     7.2 L    /  WBC  ----------------  Plt               18.2 H    ----------------    97 L            /     Hct     \                              /     20.6 L    \            RBC: 2.33 L    MCV: 88            RFP: 9/25/2023 04:24  NA+        Cl-     BUN  /                         134 L   94 L    31 H /  --------------------------------  Glucose                ---------------------------  72 L    K+     HCO3-   Creat \                         4.3    29    3.57 H \  Calcium : 8.8Anion Gap : 15          Albumin : 2.7 L    Phos : 3.3      Radiology Results:    Results:        Conclusion:  Normal sinus rhythm  Rightward axis  Septal infarct (cited on or before 12-SEP-2019)  Abnormal ECG  When compared with ECG of 02-SEP-2023 02:07, (unconfirmed)  ST elevation now present in Anterior leads  Nonspecific T wave abnormality now evident in Lateral leads  Confirmed by Gonzalo Mitchell ([id number]) on 9/24/2023 2:41:35 PM     Electrocardiogram 12 Lead [Sep 24 2023  2:53PM]      Impression:    1.  Worsened diffused ground-glass opacities involving the both  lungs, worsened pulmonary edema/fluid overload.  2. Cardiac silhouette is moderately enlarged. Aorta is tortuous.  Pulmonary vessels are congested.  3. No focal consolidation.  4. No pleural effusion or pneumothorax seen.  5. Removal of the ET tube, enteric tube, pH probe, and the IJ  catheter.  6. Vascular stent along the right axillary artery.           MACRO:  None     Xray Chest 1 View [Sep 24 2023 10:09AM]      Assessment and Plan:   Daily Risk Screen:  ·  Does patient have a central line? yes   ·  Central Line Type dialysis     Code Status:  ·  Code Status Full Code     Assessment:    53F PMHx COPD, ESRD, HFpEF admitted as transfer from [facility] following episode of bleeding from RUE AVG at dialysis. Exposed graft, large skin defect. Graft ligated  by IR at [facility] and transferred to [facility].    Tentative plan for operative resection of exposed graft 9/23, however patient persistently hypoglycemic with NPO, somnolent, intermittent tachycardia/hypotension likely related to hypoglycemia and therefore OR canceled and patient moved to SICU for monitoring    Plan:  - Plan for OR  9/26/23 for resection of exposed RUE AVG and temp HD cath placement  - Continue abx until 24hrs post-AVG resection pending negative blood cultures  - ACS will join in the OR for debridement large sacral decubitus ulceration.  - Medicine consult service engaged 9/24 for assessment for medical service transfer out of ICU  - Nephrology following for HD needs. Patient currently has no access for HD, however if need arises prior to 9/26, will need temp cath placement in ICU and HD. Labs do not suggest urgent need for HD today, however will follow-up nephrology recommendations  - Nutrition eval. Add on pre-albumin.   - Renal diet.    Discussed with Dr. Kaba and discussed with Dr. Wu.     Mago Medellin MD, PGY1   Vascular Team Pager - [phone]  Also available on [facility]    Attestation:   Note Completion:        Electronic Signatures:  Mago Medellin (Resident))  (Signed 25-Sep-2023 13:02)   Authored: Service, Subjective Data, Objective Data, Assessment  and Plan, Note Completion  Kamaljit Wu)  (Signed 25-Sep-2023 16:13)   Authored: Note Completion   Co-Signer: Service, Subjective Data, Objective Data, Assessment and Plan, Note Completion      Last Updated: 25-Sep-2023 16:13 by Kamaljit Wu)

## 2023-09-30 NOTE — H&P
History & Physical Reviewed:   Pregnant/Lactating:  ·  Are You Pregnant no   ·  Are You Currently Breastfeeding no     I have reviewed the History and Physical dated:  22-Sep-2023   History and Physical reviewed and relevant findings noted. Patient examined to review pertinent physical  findings.: No significant changes   Home Medications Reviewed: no changes noted   Allergies Reviewed: no changes noted       ERAS (Enhanced Recovery After Surgery):  ·  ERAS Patient: no     Consent:   COVID-19 Consent:  ·  COVID-19 Risk Consent Surgeon has reviewed key risks related to the risk of kim COVID-19 and if they contract COVID-19 what the risks are.     Attestation:   Note Completion:  I am a:  Resident/Fellow   Attending Attestation I saw and evaluated the patient.  I personally obtained the key and critical portions of the history and physical exam or was physically present for key and  critical portions performed by the resident/fellow. I reviewed the resident/fellow?s documentation and discussed the patient with the resident/fellow.  I agree with the resident/fellow?s medical decision making as documented in the note.     I personally evaluated the patient on 29-Sep-2023         Electronic Signatures:  Shelby Hartmann)  (Signed 29-Sep-2023 18:16)   Authored: Note Completion   Co-Signer: History & Physical Reviewed, ERAS, Consent, Note Completion  Sandi Hong (Resident))  (Signed 29-Sep-2023 05:57)   Authored: History & Physical Reviewed, ERAS, Consent,  Note Completion      Last Updated: 29-Sep-2023 18:16 by Shelby Hartmann)

## 2023-09-30 NOTE — PROGRESS NOTES
Service: Nephrology     Subjective Data:   VASQUEZ PAK is a 52 year old Female who is Hospital Day # 27.     Patient have elevated serum potassium of 6.1 today.  He refused to have dialysis emphatic that she is going home today he agrees to have Lokelma and hyperkalemia protocol with D50 insulin calcium.  We  will repeat serum potassium after patient received current medications and repeat hyperkalemia protocol if needed.  Calcium continues to be elevated at 11.5.    Objective Data:   Physical Exam by System:    Constitutional: Alert   Asthenic habitus   Eyes: pupils react to light and accommodation   ENMT: mucous membranes moist, no apparent injury,  no lesions seen   Head/Neck: Full range of motion no thyromegaly   Respiratory/Thorax: Minimal inspiratory rhonchi   Cardiovascular: 1/6 sternal border systolic murmur  no gallop no thrills or rubs   Gastrointestinal: Active peristalsis no rebound or  guarding.  She had an NG tube from the left nostril   Genitourinary: No CVA tenderness   Musculoskeletal: Decreased muscle tone   Extremities: There is a dark spot in the tip of the  index fingers suggestive of old embolic episode   Neurological: No tremors no asterixis   Breast: No masses, tenderness, no discharge or discoloration   Lymphatic: No significant lymphadenopathy   Psychological: Appropriate mood and behavior   Skin: Poor turgor     Recent Lab Results:    Results:    CBC: 8/22/2023 04:20              \     Hgb     /                              \     14.3       /  WBC  ----------------  Plt               12.0 H    ----------------    257              /     Hct     \                              /     45.9       \            RBC: 4.78     MCV: 96           RFP: 8/22/2023 04:20  NA+        Cl-     BUN  /                         134 L   90 L    101 HH /  --------------------------------  Glucose                ---------------------------  93    K+     HCO3-   Creat \                          6.1 HH   25    7.00 H  \  Calcium : 11.5 HAnion Gap : 25 H          Albumin : 5.1 H    Phos : 8.1 H        I have reviewed these laboratory results:    Renal Function Panel  22-Aug-2023 04:20:00      Result Value    Lab Comment:  K, UREA , CALLED TO JAIDEN SHEA, 08/22/2023 08:15    Glucose, Serum  93    NA  134   L   K  6.1   HH   CL  90   L   Bicarbonate, Serum  25    Anion Gap, Serum  25   H   BUN  101   HH   CREAT  7.00   H   GFR Female  6   A   Calcium, Serum  11.5   H   Phosphorus, Serum  8.1   H   ALB  5.1   H     Complete Blood Count  22-Aug-2023 04:20:00      Result Value    White Blood Cell Count  12.0   H   Nucleated Erythrocyte Count  0.0    Red Blood Cell Count  4.78    HGB  14.3    HCT  45.9    MCV  96    MCHC  31.2   L   PLT  257    RDW-CV  19.9   H       Assessment and Plan:        Additional Dx:   Abnormal electrocardiogram: Onset Date: 16-Feb-2023, Entered  Date: 16-Feb-2023 08:07   Shortness of breath at rest: Onset Date: 10-Mar-2020, Entered  Date: 10-Mar-2020 09:46   Atypical chest pain: Entered Date: 28-Jan-2020 14:41   Tachycardia: Entered Date: 16-Jan-2020 07:44   Acute respiratory failure with hypoxia: Entered Date: 15-Laz-2020  06:46   Hyperkalemia: Entered Date: 15-Laz-2020 06:46   Fluid overload: Entered Date: 15-Laz-2020 06:46   Respiratory failure: Entered Date: 13-Nov-2019 03:17   Diastolic heart failure secondary to hypertension: Entered  Date: 27-Oct-2019 03:29   Hypertensive crisis: Entered Date: 27-Oct-2019 03:28   Pre-operative exam: Entered Date: 12-Sep-2019 13:28   Pre-operative exam: Entered Date: 12-Sep-2019 13:28   Hypertensive heart disease without heart failure: Entered Date:  23-Nov-2018 10:26   End-stage renal disease: Entered Date: 23-Nov-2018 10:26   Hypertensive heart disease with heart failure: Entered Date:  23-Nov-2018 10:26   Acute on chronic diastolic congestive heart failure: Entered  Date: 23-Nov-2018 10:26   Idiopathic pulmonary hemosiderosis:  Entered Date: 21-Nov-2018  13:11   Troponin level elevated: Entered Date: 19-Nov-2018 12:15   PNA (pneumonia): Entered Date: 17-Nov-2018 18:21   Dependence on renal dialysis: Entered Date: 16-Jun-2018 11:33   Non-ST elevation myocardial infarction (NSTEMI), type 2: Entered  Date: 15-Mayank-2018 15:41   Abnormal EKG: Onset Date: 06-Mar-2018, Entered Date: 06-Mar-2018  11:34   Infection due to Streptococcus pneumoniae: Onset Date: 16-Feb-2018,  Entered Date: 16-Feb-2018 18:40   Hemoptysis: Onset Date: 16-Feb-2018, Entered Date: 16-Feb-2018  18:40   Abnormal chest CT: Onset Date: 16-Feb-2018, Entered Date: 16-Feb-2018  18:39   Acute on chronic respiratory failure: Onset Date: 16-Feb-2018,  Entered Date: 16-Feb-2018 18:39   Chronic respiratory failure: Onset Date: 16-Feb-2018, Entered  Date: 16-Feb-2018 00:02   ESRD (end stage renal disease) on dialysis: Entered Date: 09-Feb-2018  22:25   History of hemoptysis: Entered Date: 08-Feb-2018 17:46   Hypertensive heart disease without heart failure: Entered Date:  31-Jan-2018 21:11   End-stage renal disease: Entered Date: 31-Jan-2018 21:11   Diarrhea: Entered Date: 29-Jan-2018 11:00   Sepsis: Entered Date: 29-Jan-2018 11:00   HCAP (healthcare-associated pneumonia): Entered Date: 29-Jan-2018  10:59   Diffuse pulmonary alveolar hemorrhage: Entered Date: 04-Dec-2017  07:07   Hypertensive heart disease without heart failure: Entered Date:  26-Jul-2017 19:35   Encounter for preadmission testing: Onset Date: 09-Feb-2017,  Entered Date: 13-Feb-2017 08:52   Encounter for other preprocedural examination: Entered Date:  13-Dec-2016 15:54       Medical History:   CHF, acute on chronic: Entered Date: 15-Nov-2019 08:58   ESRD (end stage renal disease) on dialysis: Entered Date: 15-Nov-2019  08:58   Volume overload: Entered Date: 15-Nov-2019 08:58   Current nonadherence to medical treatment: Entered Date: 13-Nov-2019  06:09   CHF (congestive heart failure): Onset Date: 30-Jan-2018, Entered   Date: 30-Jan-2018 15:45   Anemia: Entered Date: 30-Nov-2017 20:07   Pulmonary hemorrhage: Entered Date: 30-Nov-2017 19:49   Hypothyroidism: Entered Date: 26-Jul-2017 01:23   COPD (chronic obstructive pulmonary disease): Entered Date:  26-Jul-2017 01:22   Hypertension: Entered Date: 26-Jul-2017 01:22   ESRD (end stage renal disease) on dialysis: Entered Date: 26-Jul-2017  01:22       Surg History:   Tachycardia: Onset Date: 14-Nov-2019, Entered Date: 14-Nov-2019  09:57   Shortness of breath at rest: Onset Date: 28-Oct-2019, Entered  Date: 28-Oct-2019 10:55   Chest pain: Onset Date: 29-Nov-2017, Entered Date: 29-Nov-2017  10:18   Presence of surgically created primary arteriovenous shunt for hemodialysis : Entered Date: 26-Jul-2017 01:22    Code Status:  ·  Code Status Full Code       Impression 1: End-stage renal disease   Plan for Impression 1: Dialysis today if accepted  or tomorrow as per schedule   Impression 2: Acute hyperkalemia   Plan for Impression 2: Lokelma  Dextrose insulin  Calcium gluconate  Repeat serum potassium   Impression 3: Hypercalcemia   Plan for Impression 3: Zoledronic acid in the morning  Continue Cinacalcet  Monitor  calcium       Electronic Signatures:  Daren Bocanegra)  (Signed 22-Aug-2023 12:41)   Authored: Service, Subjective Data, Objective Data, Assessment  and Plan, Note Completion      Last Updated: 22-Aug-2023 12:41 by Daren Bocanegra)

## 2023-10-01 NOTE — CARE PLAN
Problem: Pain  Goal: My pain/discomfort is manageable  10/1/2023 0407 by Soumya Peralta RN  Outcome: Progressing  10/1/2023 0313 by Soumya Peralta RN  Outcome: Progressing     Problem: Daily Care  Goal: Daily care needs are met  10/1/2023 0407 by Soumya Peralta RN  Outcome: Progressing  10/1/2023 0313 by Soumya Peralta RN  Outcome: Progressing   The patient's goals for the shift include      The clinical goals for the shift include      Over the shift, the patient did not make progress toward the following goals. Barriers to progression include wound pain management. Recommendations to address these barriers include continue to administer PRN pain meds.

## 2023-10-01 NOTE — PROGRESS NOTES
Kesha Lowe is a pleasant 53 y.o. female on day 9 of admission presenting with No Principal Problem: There is no principal problem currently on the Problem List. Please update the Problem List and refresh..    Subjective   Doing well this morning. Right arm pain is controlled and appropriate.        Objective   Last Recorded Vitals  Blood pressure 137/59, pulse 99, temperature 37.4 °C (99.3 °F), resp. rate 18, height 1.524 m (5'), weight (!) 44.6 kg (98 lb 5.2 oz), SpO2 96 %.    Intake/Output last 3 Shifts:  I/O last 3 completed shifts:  In: 790 (17.7 mL/kg) [NG/GT:690; IV Piggyback:100]  Out: - (0 mL/kg)   Weight: 44.6 kg     Physical Exam  Constitutional:       General: She is not in acute distress.     Comments: Malnourished appearing.   HENT:      Head: Normocephalic.      Mouth/Throat:      Mouth: Mucous membranes are moist.   Eyes:      General: No scleral icterus.  Cardiovascular:      Rate and Rhythm: Normal rate.      Pulses:           Radial pulses are 2+ on the right side and 2+ on the left side.   Pulmonary:      Effort: Pulmonary effort is normal. No respiratory distress.      Breath sounds: No stridor.   Abdominal:      General: Abdomen is flat. There is no distension.   Musculoskeletal:      Right lower leg: No edema.      Left lower leg: No edema.   Skin:     General: Skin is warm.      Capillary Refill: Capillary refill takes less than 2 seconds.      Coloration: Skin is not jaundiced.      Comments: Right arm incision is clean, dry, and intact. Middle incision with staples.    Neurological:      General: No focal deficit present.      Mental Status: She is alert and oriented to person, place, and time. Mental status is at baseline.   Psychiatric:         Mood and Affect: Mood normal.         Relevant Results  Medications:  Scheduled Meds:daptomycin, 350 mg, intravenous, q48h  ergocalciferol, 8,000 Units, oral, Daily  insulin lispro, 0-5 Units, subcutaneous, q6h  lidocaine, 1 patch,  transdermal, q24h  melatonin, 3 mg, oral, Nightly  pantoprazole, 40 mg, intravenous, BID  piperacillin-tazobactam, 2.25 g, intravenous, q8h  QUEtiapine, 25 mg, oral, Nightly      Continuous Infusions:   PRN Meds:.PRN medications: dextrose, dextrose, dextrose, glucagon, HYDROmorphone, ondansetron, oxymetazoline    Labs:  CBC - WBC/Hgb/Hct/Plts: --/5.3*/16.2*/-- (10/01 0522)  CHEM - BUN/Cr/glu/ALT/AST/amyl/lip:13/1.48*/--/--/--/--/-- (09/30 1124)  COAG - PT/INR/PTT: 15.2*/1.3*/-- (09/30 1124)  LYTES - Na/K/Cl/CO2: 142/3.8/101/32 (09/30 1124)       Assessment/Plan   Kesha Lowe is a pleasant 53 y.o. female who underwent right upper extremity AVG excision on 9/29/2023. She is recovering appropriately. The right arm incisions look good.     Plan:  Continue antibiotics for 2 weeks, at minimum.   Keep right arm wrapped gently with ACE bandage  Betadine to incision.  Vascular to change dressing 10/2  No other vascular intervention needed at this time.  Rest of care per primary team       Patient seen and discussed with the attending, Dr. Hartmann.  Eliud Morrissey MD

## 2023-10-01 NOTE — OP NOTE
PROCEDURE DETAILS    Preoperative Diagnosis:  stage IV sacral decubitus ulcer   Postoperative Diagnosis:  stage IV sacral decubitus ulcer   Surgeon: Dr. Still   Resident/Fellow/Other Assistant: Dr. Romana Nieto     Procedure:  sacral decubitus ulcer debridement  Estimated Blood Loss: 10  Findings: sacral decubitus ulcer with mild necrotic tissue along right lateral edge, excised and debrided   Specimens(s) Collected: no,     Patient Returned To/Condition: PACU/stable                                Attestation:   Note Completion:  Attending Attestation I was present for key portions of the procedure and the procedure lasted longer than 5 minutes.    I am a: Resident/Fellow         Electronic Signatures:  Romana Nieto (Resident))  (Signed 29-Sep-2023 12:35)   Authored: Post-Operative Note, Chart Review, Note Completion  Ghanshyam Still)  (Signed 29-Sep-2023 14:00)   Authored: Post-Operative Note, Note Completion   Co-Signer: Post-Operative Note, Chart Review, Note Completion      Last Updated: 29-Sep-2023 14:00 by Ghanshyam Still)

## 2023-10-01 NOTE — CARE PLAN
The patient's goals for the shift include      The clinical goals for the shift include      Over the shift, the patient did not make progress toward the following goals. Barriers to progression include   Problem: Safety  Goal: Patient will be injury free during hospitalization  Outcome: Met  Goal: I will remain free of falls  Outcome: Met     Problem: Pain  Goal: My pain/discomfort is manageable  Outcome: Progressing     Problem: Daily Care  Goal: Daily care needs are met  Outcome: Progressing   . Recommendations to address these barriers include , wound care per order

## 2023-10-01 NOTE — OP NOTE
PROCEDURE DETAILS    Preoperative Diagnosis:  exposed RUE AV graft  Postoperative Diagnosis:  exposed RUE AV graft  Surgeon: Shelby Hartmann  Resident/Fellow/Other Assistant: Deacon Kaba    Procedure:  Explant of right upper extremity arteriovenous graft  Anesthesia: GETA  Estimated Blood Loss: 50 cc  Blood Replaced: 1 pack platelets  Findings: grossly exposed RUE AV graft  Specimens(s) Collected: yes,  graft for culture   Complications: none  IV Fluids: 200 cc crystalloid  Additional Details: leave arm wrap on for 48 hours, then betadine and dry gauze to middle incision, proximal and distal dressings can come off in 3 days, then also betadine and dry gauze  dressings, all changed daily  Patient Returned To/Condition: PACU/ stable    Date of Dictation: 29-Sep-2023  Dictated By: neris                            Attestation:   Note Completion:  Attending Attestation I was present for the entire procedure    I am a: Resident/Fellow         Electronic Signatures:  Shelby Hartmann)  (Signed 29-Sep-2023 18:18)   Authored: Post-Operative Note, Chart Review, Note Completion   Co-Signer: Post-Operative Note, Chart Review, Note Completion  Deacon Kaba (Fellow))  (Signed 29-Sep-2023 11:55)   Authored: Post-Operative Note, Chart Review, Note Completion      Last Updated: 29-Sep-2023 18:18 by Shelby Hartmann)

## 2023-10-01 NOTE — CARE PLAN
Problem: Fall/Injury  Goal: Not fall by end of shift  Outcome: Met     Problem: Fall/Injury  Goal: Be free from injury by end of the shift  Outcome: Met

## 2023-10-02 PROBLEM — N18.6 ESRD (END STAGE RENAL DISEASE) ON DIALYSIS (MULTI): Status: ACTIVE | Noted: 2023-01-01

## 2023-10-02 PROBLEM — Z99.2 ESRD (END STAGE RENAL DISEASE) ON DIALYSIS (MULTI): Status: ACTIVE | Noted: 2023-01-01

## 2023-10-02 NOTE — PROGRESS NOTES
Kesha Lowe is a 53 y.o. female on day 9 of admission presenting with No Principal Problem: There is no principal problem currently on the Problem List. Please update the Problem List and refresh..    Subjective   Patient was seen and examined at bedside. Patient was agitated today because she wanted to be change. She is pending transfusion        Objective     Physical Exam  Constitutional: Cachectic. A&Ox2  HEENT: dry oral mucosa. Dobhoff in place   CV: RRR, Grade 3 systolic murmur  PULM: CTAB, wheezing bilaterally, on 2L NC  ABDOMEN: Soft, NT/ND. No TTP. + BSx4.  SKIN: Normal Color, Warm, Dry, No Rashes, sacral pressure ulcer  EXTREMITIES: Non-Tender, Full ROM, No Pedal Edema, RUE with bandage  NEURO: A&O x 1-2, nonfocal neurological exam.    Last Recorded Vitals  Blood pressure 151/61, pulse 97, temperature 36.5 °C (97.7 °F), resp. rate 18, height 1.524 m (5'), weight (!) 44.6 kg (98 lb 5.2 oz), SpO2 98 %.  Intake/Output last 3 Shifts:  I/O last 3 completed shifts:  In: 940 (21.1 mL/kg) [NG/GT:840; IV Piggyback:100]  Out: - (0 mL/kg)   Weight: 44.6 kg     Relevant Results  Scheduled medications  daptomycin, 350 mg, intravenous, q48h  ergocalciferol, 8,000 Units, oral, Daily  insulin lispro, 0-5 Units, subcutaneous, q6h  lidocaine, 1 patch, transdermal, q24h  melatonin, 3 mg, oral, Nightly  pantoprazole, 40 mg, intravenous, BID  piperacillin-tazobactam, 2.25 g, intravenous, q8h  QUEtiapine, 25 mg, oral, Nightly      Continuous medications     PRN medications  PRN medications: dextrose, dextrose, dextrose, glucagon, HYDROmorphone, ondansetron, oxymetazoline            Results for orders placed or performed during the hospital encounter of 09/22/23 (from the past 24 hour(s))   POCT GLUCOSE   Result Value Ref Range    POCT Glucose 121 (H) 74 - 99 mg/dL   POCT GLUCOSE   Result Value Ref Range    POCT Glucose 122 (H) 74 - 99 mg/dL   POCT GLUCOSE   Result Value Ref Range    POCT Glucose 120 (H) 74 - 99  mg/dL   CBC   Result Value Ref Range    WBC 15.1 (H) 4.4 - 11.3 x10*3/uL    nRBC 0.3 (H) 0.0 - 0.0 /100 WBCs    RBC 1.76 (L) 4.00 - 5.20 x10*6/uL    Hemoglobin 5.3 (LL) 12.0 - 16.0 g/dL    Hematocrit 16.2 (L) 36.0 - 46.0 %    MCV 92 80 - 100 fL    MCH 30.1 26.0 - 34.0 pg    MCHC 32.7 32.0 - 36.0 g/dL    RDW 17.2 (H) 11.5 - 14.5 %    Platelets 47 (L) 150 - 450 x10*3/uL    MPV 11.7 (H) 7.5 - 11.5 fL   Magnesium   Result Value Ref Range    Magnesium 2.03 1.60 - 2.40 mg/dL   POCT GLUCOSE   Result Value Ref Range    POCT Glucose 109 (H) 74 - 99 mg/dL   POCT GLUCOSE   Result Value Ref Range    POCT Glucose 100 (H) 74 - 99 mg/dL                        Assessment/Plan   Active Problems:  There are no active Hospital Problems.    There are no active Hospital Problems.  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 Pt had HD at OSH on 9/22 and had complete session. Towards end of HD session, RUE AVG was bleeding excessively. Graft was ligated on 9/22 and pt transferred to [facility] to have graft removed. On 9/23, pt was scheduled for OR graft explant but case cancelled as pt was hypoglycemic, hypotensive, and altered so transferred to CTICU on 9/23     PLAN:  Acute anemia most likely Post op losses       1. Patient is pending transfusion but she has antibodies        2. Lab working on the blood      2. Acute delirium in the setting of prolonged hospitalization, narcotics     1. Waxing and waning      3. Right upper AVG bleeding s/p explant and sacral decubitus ulcer debridment      1. tolerated procedure       2. Vascular is requesting to continue antibiotics for 2 weeks       3. Patient is on dilaudid for pain control      3.. HFpEF/Tricuspid regurg/R sided heart failure      1. Appreciate cards recommendation-will follow up outpatient      4.  Thrombocytopenia-multifactorial       1. stable        2. continue to monitor      5. HTN/HLD/Hypotension-- sepsis vs hemorrhagic shock        1. resolved      6. Chronic hypoxic respiratory failure/COPD on 6 L home O2      1. continue duonebs every 4 hours as need it for wheezing      7. Severe Protein Calorie Malnutrition/Grade D Esophagitis      1. 9/25 EGD: Moderately severe LA Grade D esophagitis with circumferential ulceration and exudate not actively bleeding was found. Coffee grounds in esophagus.  Scattered moderate inflammation and scattered hematin was found in the stomach. No signs of active bleeding observed. No bleeding lesions. The examined duodenum showed erosive peptic duodenitis in setting of melanosis, mainly deep erosions but no vessel or pigmented spots.     8. ESRD on HD (MWF)      1. Renal following      9.  Hypoglycemia       1. resolved         Disposition: continue to monitor.  Pending blood tranfusion but patient with antibodies and waiting on [facility] to [facility] blood      DOC GEORGETTE DALY                     I spent 25 minutes in the professional and overall care of this patient.       I spent 30 minutes in the professional and overall care of this patient.      Georgette Ibrahim DO

## 2023-10-02 NOTE — CONSULTS
Wound Care Consult     Visit Date: 10/2/2023      Patient Name: Kesha Lowe         MRN: [id number]           YOB: 1970     Reason for Consult: Wound care was consulted for wound at AV fistula site. This wound is being monitored and assessed by vascular team. Please see consult note for wound assessment and dressing recommendations. Wound care will defer consult for now but please reconsult if wound care is needed while patient is admitted to hospital.      Carolina Luis RN  10/2/2023  1:37 PM

## 2023-10-02 NOTE — PROGRESS NOTES
Vasquez Lowe is a 53 y.o. 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     Cardiology consulted in light of echocardiogram findings of severe TR, restricted TV leaflet mobility and severely elevated RVSP. .    Subjective   Seen in dialysis        Objective     Physical Exam  Frail appearing and thin   Eyes appear jaundice  Stares at me when asked questions  Right neck trialysis  Left foot IV noted no swelling, warm   Abd soft, non distended   Warm and dry  Right lower arm s/p AVG explant ace wrap noted dry       Last Recorded Vitals  Blood pressure (!) 127/45, pulse 105, temperature 36.7 °C (98.1 °F), temperature source Temporal, resp. rate 16, height 1.524 m (5'), weight (!) 44.6 kg (98 lb 5.2 oz), SpO2 99 %.  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     Cardiology consulted in light of echocardiogram findings of severe TR, restricted TV leaflet mobility and severely elevated RVSP.   Per echo report: Compared with echo study from 9/6/2023, the technique and dedicated RV images are different. In today's study the degree of tricuspid  regurgitation is severe compared with moderate and the RVSP is now severely increased at 143 compared to moderate to severe on the prior study. The LVEF is now hyperdynamic with EF 65-70% compared to mildly reduced (45%).  Last HD session prior to the most recent echocardiogram was on 9/22 and noted to have only half sessions due to hemodynamic instability in the setting of sepsis.      # Severe TR  # Severe pHTN  - Likely secondary in the setting of severe pHTN (COPD on home oxygen, pulmonary hemosiderosis and prior pulmonary insults, HFpEF and volume overload in the setting of ESRD). Patient is hypervolumic in the setting of missed HD sessions and incomplete HD sessions lately   - Echo showing TV annular dilatation consistent with secondary/functional TR in the setting of RV pressure and volume overload. TV leaflet thickening and restricted mobility also noted which is also contributing to her severe TR    # Severe concentric LVH and RV hypertrophy  On further review of the echocardiogram images, there is severe LV wall thickening consistent with severe concentric LVH as well as increased RV wall thickening and valvular thickening. Will need to rule out infiltrative cardiac disease such as amyloid with cMRI though those changes could be seen with severe systemic hypertension and pHTN.       Recommendations:  - Patient will need aggressive optimization of her volume status with HD once able (temp dialysis line placed, pending AVG repair with vascular surgery)  - No current indication for surgical intervention on her TV given this is likely secondary/function   - Will need to repeat echocardiogram once patient euvolemic to re-assess degree of TR  - Would recommend further evaluation of her pHTN potentially next week once more optimzied   - Once patient optimized would also recommend a Cardiac MRI to rule out any infiltrative disease (order next week)    Cardiology following            I spent 50 minutes in the  professional and overall care of this patient.      Lashell Jones, APRN-CNP

## 2023-10-02 NOTE — NURSING NOTE
Report from Sending RN:    Report From: RN  Recent Surgery of Procedure: right AVF removed 10/01/23  Baseline Level of Consciousness (LOC): A/O x 1  Oxygen Use: Yes; 2 liters  Type: n/a  Diabetic: No,   Last BP Med Given Day of Dialysis: no  Last Pain Med Given: dilaudid 0.4 mg IVP  Lab Tests to be Obtained with Dialysis: Yes; routine labs  Blood Transfusion to be Given During Dialysis: No,   Available IV Access: Yes  Medications to be Administered During Dialysis: No,   Continuous IV Infusion Running: No  Restraints on Currently or in the Last 24 Hours: No  Hand-Off Communication: No acute overnight or morning events; vss;  Pt did take morning medications; Pt may go off unit without telemetry; Glenny Medina RN.

## 2023-10-02 NOTE — CARE PLAN
Problem: Pain  Goal: My pain/discomfort is manageable  Outcome: Progressing     Problem: Daily Care  Goal: Daily care needs are met  Outcome: Progressing     Problem: Fall/Injury  Goal: Not fall by end of shift  Outcome: Met     Problem: Fall/Injury  Goal: Be free from injury by end of the shift  Outcome: Met

## 2023-10-02 NOTE — POST-PROCEDURE NOTE
Report to Receiving RN:    Report From: Ramirez  Time Report Called: [id number]  Hand-Off Communication: Yes, Pt had very large liquid stool. Also, pt moaned entire tx despite being medicated. TF off for tx  Complications During Treatment: Yes, Pt had very large liquid stool. Also, pt moaned entire tx despite being medicated. TF off for tx  Ultrafiltration Treatment: Yes, .5L  Medications Administered During Dialysis: Yes, Dilaudid   Blood Products Administered During Dialysis: No  Labs Sent During Dialysis: No  Heparin Drip Rate Changes: N/A    Electronic Signatures:    ) Authored: JOSEF Coley RN      Last Updated: 10:44 AM by LORENA COLEY

## 2023-10-02 NOTE — PROGRESS NOTES
Kesha Lowe is a 53 y.o. female on day 10 of admission presenting with No Principal Problem: There is no principal problem currently on the Problem List. Please update the Problem List and refresh..    Subjective   NAOE. Patient was seen on rounds today. Lying in bed comfortably, in no acute distress. Changed dressings RUE.      Objective   Physical Exam  Constitutional:       General: She is not in acute distress.     Comments: Malnourished appearing    HENT:      Head: Normocephalic.      Nose: Nose normal.      Mouth/Throat:      Mouth: Mucous membranes are moist.      Pharynx: Oropharynx is clear.   Eyes:      General: No scleral icterus.  Cardiovascular:      Rate and Rhythm: Regular rhythm.      Pulses: Normal pulses.      Comments: Slightly TC  Palpable radial pulses   Pulmonary:      Effort: Pulmonary effort is normal. No respiratory distress.   Abdominal:      General: Abdomen is flat. There is no distension.      Palpations: Abdomen is soft.   Musculoskeletal:      Cervical back: Normal range of motion and neck supple.   Skin:     General: Skin is warm.   Neurological:      General: No focal deficit present.      Mental Status: She is disoriented.   Psychiatric:      Comments: Patient is confused and slightly disorientated       RUE: swelling improved from yesterday. Proximal and distal I[place]an dressings taken down, skin and incision clean dry and intact underneath. Staples over middle of incision in place. Redressed with betadine, telfa, kerlex, and ace wrap.       Last Recorded Vitals  Blood pressure 155/61, pulse 102, temperature 36.9 °C (98.4 °F), resp. rate 15, height 1.524 m (5'), weight (!) 44.6 kg (98 lb 5.2 oz), SpO2 100 %.  Intake/Output last 3 Shifts:  I/O last 3 completed shifts:  In: 1480 (33.2 mL/kg) [NG/GT:1380; IV Piggyback:100]  Out: - (0 mL/kg)   Weight: 44.6 kg     Relevant Results  Scheduled medications  daptomycin, 350 mg, intravenous, q48h  ergocalciferol, 8,000 Units,  oral, Daily  insulin lispro, 0-5 Units, subcutaneous, q6h  lidocaine, 1 patch, transdermal, q24h  magnesium sulfate, , ,   melatonin, 3 mg, oral, Nightly  pantoprazole, 40 mg, intravenous, BID  piperacillin-tazobactam, 2.25 g, intravenous, q8h      Continuous medications     PRN medications  PRN medications: dextrose, dextrose, dextrose, glucagon, HYDROmorphone, magnesium sulfate     Results for orders placed or performed during the hospital encounter of 09/22/23 (from the past 24 hour(s))   POCT GLUCOSE   Result Value Ref Range    POCT Glucose 109 (H) 74 - 99 mg/dL   POCT GLUCOSE   Result Value Ref Range    POCT Glucose 100 (H) 74 - 99 mg/dL   POCT GLUCOSE   Result Value Ref Range    POCT Glucose 118 (H) 74 - 99 mg/dL   CBC   Result Value Ref Range    WBC 21.9 (H) 4.4 - 11.3 x10*3/uL    nRBC 2.8 (H) 0.0 - 0.0 /100 WBCs    RBC 1.43 (L) 4.00 - 5.20 x10*6/uL    Hemoglobin 4.5 (LL) 12.0 - 16.0 g/dL    Hematocrit 13.9 (L) 36.0 - 46.0 %    MCV 97 80 - 100 fL    MCH 31.5 26.0 - 34.0 pg    MCHC 32.4 32.0 - 36.0 g/dL    RDW 18.5 (H) 11.5 - 14.5 %    Platelets 80 (L) 150 - 450 x10*3/uL    MPV 13.1 (H) 7.5 - 11.5 fL   Renal Function Panel   Result Value Ref Range    Glucose 103 (H) 74 - 99 mg/dL    Sodium 142 136 - 145 mmol/L    Potassium 4.2 3.5 - 5.3 mmol/L    Chloride 102 98 - 107 mmol/L    Bicarbonate 26 21 - 32 mmol/L    Anion Gap 18 10 - 20 mmol/L    Urea Nitrogen 36 (H) 6 - 23 mg/dL    Creatinine 2.74 (H) 0.50 - 1.05 mg/dL    eGFR 20 (L) >60 mL/min/1.73m*2    Calcium 9.6 8.6 - 10.6 mg/dL    Phosphorus 1.7 (L) 2.5 - 4.9 mg/dL    Albumin 2.9 (L) 3.4 - 5.0 g/dL   Magnesium   Result Value Ref Range    Magnesium 2.69 (H) 1.60 - 2.40 mg/dL   POCT GLUCOSE   Result Value Ref Range    POCT Glucose 136 (H) 74 - 99 mg/dL        If you would like to pull in Imaging results, type .imgrslt :99}  XR abdomen 1 view    Result Date: 9/28/2023  Interpreted By:  EMMA REAL MD and AAMIR CALDERON MD MRN: [id number] Patient Name:  VASQUEZ PAK  STUDY: ABDOMEN AP VIEW;  9/28/2023 3:00 pm  INDICATION: Abdoominal pain .  COMPARISON: Single-view abdomen 09/27/2023  ACCESSION NUMBER(S): [id number]  ORDERING CLINICIAN: KYE SONG  FINDINGS: Enteric tube in place with tip overlying the gastric body. Mild gaseous distention of the stomach and multiple bowel loops. Nonobstructive bowel gas pattern. Limited evaluation of pneumoperitoneum on supine imaging, however no gross evidence of free air is noted. Aortic and iliac graft. Splenic artery calcification.  Visualized lungs are clear.  Osseous structures demonstrate no acute bony changes.      1.  Enteric tube unchanged in position. Nonobstructive bowel-gas pattern.  I personally reviewed the images/study and I agree with the findings as stated. This study was interpreted at [place], [place].  MACRO: None    XR abdomen 1 view    Result Date: 9/27/2023  Interpreted By:  EMMA REAL MD MRN: [id number] Patient Name: VASQUEZ PAK  STUDY: ABDOMEN AP VIEW;  9/27/2023 2:50 pm  INDICATION: Dobhoff placemeny .  COMPARISON: 09/16/2023  ACCESSION NUMBER(S): [id number]  ORDERING CLINICIAN: ALFREDA BUENROSTRO  FINDINGS: Single high centered KUB of the abdomen. Dobbhoff catheter is seen in the left upper abdomen with tip in position consistent with gastric placement. Patient has had previous aortic and iliac graft surgery. There is prominent calcification of the splenic artery. Nonobstructive bowel gas pattern. Limited evaluation of pneumoperitoneum on supine imaging, however no gross evidence of free air is noted.  Visualized lungs are clear. The heart appears to be enlarged.  Osseous structures demonstrate no acute bony changes.      1.  Confirmation of normal enterogastric tube placement. Nonspecific abdomen findings.  MACRO: None    XR chest 1 view    Result Date: 9/26/2023  Interpreted By:  EMMA REAL MD and AAMIR CALDERON MD MRN: [id number]  Patient Name: VASQUEZ LOWE  STUDY: CHEST 1 VIEW;  9/26/2023 3:18 pm  INDICATION: S/p temporary hemodialysis catheter placement .  COMPARISON: Single-view chest 9/25/2023  ACCESSION NUMBER(S): [id number]  ORDERING CLINICIAN: TACHO RIOS  FINDINGS: AP radiograph of the chest was provided.  Interval placement of right IJ central axis catheter with tip terminating in the distal SVC.  CARDIOMEDIASTINAL SILHOUETTE: Cardiomediastinal silhouette is enlarged and stable in size and configuration. Calcifications of the aortic knob.  LUNGS: Patchy interstitial and airspace opacities are diffusely worsened compared to prior exam, most notably in the right lower lobe. Pleural effusion. No pneumothorax.  ABDOMEN: No remarkable upper abdominal findings.  BONES: No acute osseous changes.      1.  Interval placement of right IJ central axis catheter with tip terminating in distal SVC. 2. Patchy interstitial airspace opacities are diffusely worsened, most notably in the right lower lobe. Findings are concerning for worsening pulmonary edema or infectious process such as multifocal pneumonia.  I personally reviewed the images/study and I agree with the findings as stated. This study was interpreted at [place], [place].   MACRO: None                             Assessment/Plan   Active Problems:  There are no active Hospital Problems.    Vasquez Lowe is a pleasant 53 y.o. female who underwent right upper extremity AVG excision on 9/29/2023. She is recovering appropriately. The right arm incisions look good.      Plan:  Continue antibiotics for 2 weeks, at minimum.   Nurse to change dressing daily 10/3 with betadine over incisions, dry gauze, kerlex and ace wrap. (On RUE)   No other vascular intervention needed at this time. Will likely need tunneled HD catheter placed by IR this week.   Rest of care per primary team           Patient seen with fellow Dr. Kaba and  discussed with the attending, Dr. Hartmann.        Mago Medellin MD, PGY1  Vascular Surgery [id number]

## 2023-10-03 NOTE — PROGRESS NOTES
Kesha Lowe is a 53 y.o. female on day 10 of admission presented for hypotension, av fistula malfunction and ESRD.   Subjective   Patient was seen and examined at bedside. Patient was more confused today. We are [facility] and her hemoglobin dropped to 4.5.  Spoke to Patient's  and sister about the risk of unmatched blood transfusion and the risk of severe anemia.  They are aware with each transfusion she develops antibodies and as of right now there is an unknown period of blood delivery but there are also aware she is a risk of major ischemic event such as cardiac arrest, stroke or death due to severe drop of hemoglobin. Sister and  agreed to transfuse one unit of unmatched blood      Objective     Physical Exam    Constitutional: Cachectic. A&Ox1  HEENT: dry oral mucosa. Dobhoff in place   CV: RRR, Grade 3 systolic murmur  PULM: CTAB, on 2L NC  ABDOMEN: Soft, NT/ND. No TTP. + BSx4.  SKIN: Pale, Warm, Dry, No Rashes, sacral pressure ulcer  EXTREMITIES: Non-Tender, Full ROM, No Pedal Edema, RUE with bandage  NEURO: A&O x 1, nonfocal neurological exam    Last Recorded Vitals  Blood pressure 144/55, pulse (!) 124, temperature 37.1 °C (98.8 °F), resp. rate 17, height 1.524 m (5'), weight (!) 44.6 kg (98 lb 5.2 oz), SpO2 99 %.  Intake/Output last 3 Shifts:  I/O last 3 completed shifts:  In: 1690 (37.9 mL/kg) [I.V.:400 (9 mL/kg); Other:400; NG/GT:840; IV Piggyback:50]  Out: 900 (20.2 mL/kg) [Other:900]  Weight: 44.6 kg     Relevant Results        Scheduled medications  daptomycin, 350 mg, intravenous, q48h  diphenhydrAMINE, 25 mg, intravenous, Once  ergocalciferol, 8,000 Units, oral, Daily  insulin lispro, 0-5 Units, subcutaneous, q6h  lidocaine, 1 patch, transdermal, q24h  melatonin, 3 mg, oral, Nightly  methylPREDNISolone sodium succinate (PF), 125 mg, intravenous, Once  pantoprazole, 40 mg, intravenous, BID  piperacillin-tazobactam, 2.25 g, intravenous,  q8h      Continuous medications     PRN medications  PRN medications: dextrose, dextrose, glucagon, HYDROmorphone           Results for orders placed or performed during the hospital encounter of 09/22/23 (from the past 24 hour(s))   POCT GLUCOSE   Result Value Ref Range    POCT Glucose 118 (H) 74 - 99 mg/dL   CBC   Result Value Ref Range    WBC 21.9 (H) 4.4 - 11.3 x10*3/uL    nRBC 2.8 (H) 0.0 - 0.0 /100 WBCs    RBC 1.43 (L) 4.00 - 5.20 x10*6/uL    Hemoglobin 4.5 (LL) 12.0 - 16.0 g/dL    Hematocrit 13.9 (L) 36.0 - 46.0 %    MCV 97 80 - 100 fL    MCH 31.5 26.0 - 34.0 pg    MCHC 32.4 32.0 - 36.0 g/dL    RDW 18.5 (H) 11.5 - 14.5 %    Platelets 80 (L) 150 - 450 x10*3/uL    MPV 13.1 (H) 7.5 - 11.5 fL   Renal Function Panel   Result Value Ref Range    Glucose 103 (H) 74 - 99 mg/dL    Sodium 142 136 - 145 mmol/L    Potassium 4.2 3.5 - 5.3 mmol/L    Chloride 102 98 - 107 mmol/L    Bicarbonate 26 21 - 32 mmol/L    Anion Gap 18 10 - 20 mmol/L    Urea Nitrogen 36 (H) 6 - 23 mg/dL    Creatinine 2.74 (H) 0.50 - 1.05 mg/dL    eGFR 20 (L) >60 mL/min/1.73m*2    Calcium 9.6 8.6 - 10.6 mg/dL    Phosphorus 1.7 (L) 2.5 - 4.9 mg/dL    Albumin 2.9 (L) 3.4 - 5.0 g/dL   Magnesium   Result Value Ref Range    Magnesium 2.69 (H) 1.60 - 2.40 mg/dL   IRL ABID WORKUP   Result Value Ref Range    ABO TYPE O     Rh TYPE POS     ANTIBODY SCREEN POS    POCT GLUCOSE   Result Value Ref Range    POCT Glucose 136 (H) 74 - 99 mg/dL   POCT GLUCOSE   Result Value Ref Range    POCT Glucose 62 (L) 74 - 99 mg/dL   POCT GLUCOSE   Result Value Ref Range    POCT Glucose 83 74 - 99 mg/dL   POCT GLUCOSE   Result Value Ref Range    POCT Glucose 103 (H) 74 - 99 mg/dL   POCT GLUCOSE   Result Value Ref Range    POCT Glucose 76 74 - 99 mg/dL   Prepare RBC   Result Value Ref Range    PRODUCT CODE V4836X01     Unit Number [id number]     Unit ABO O     Unit RH POS     Dispense Status EI     Blood Expiration Date October 06, 2023 23:59 EDT     PRODUCT BLOOD TYPE 5100      UNIT VOLUME 350     PRODUCT CODE Z3810S81     Unit Number [id number]     Unit ABO O     Unit RH POS     Dispense Status EI     Blood Expiration Date October 05, 2023 23:59 EDT     PRODUCT BLOOD TYPE 5100     UNIT VOLUME 350                  Assessment/Plan   Active Problems:    ESRD (end stage renal disease) on dialysis (CMS/[facility])  VASQUEZ PAK is a 53 year old Female with PMH ESRD on HD MWF via RUE AVF, COPD on home O2 6L, 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 Pt had HD at OSH on 9/22 and had complete session. Towards end of HD session, RUE AVG was bleeding excessively. Graft was ligated on 9/22 and pt transferred to [facility] to have graft removed. On 9/23, pt was scheduled for OR graft explant but case cancelled as pt was hypoglycemic, hypotensive, and altered so transferred to CTICU on 9/23 and transferred to the floor 10/02/23     PLAN:  Acute anemia most likely Post op losses       1. Hemoglobin dropped to 4.5 today        2.  Family is in agreement to emergency release blood of unit today        3. Blood has been released and she will get one unit tonight       2. Acute delirium in the setting of prolonged hospitalization, narcotics     1. continue to monitor      3. Right upper AVG bleeding s/p explant and sacral decubitus ulcer debridment      1. Stable       2. Vascular is requesting to continue antibiotics for 2 weeks       3. Patient is on dilaudid for pain control      3.. HFpEF/Tricuspid regurg/R sided heart failure      1. Appreciate cards recommendation-will follow up outpatient      4. Thrombocytopenia-multifactorial       1. stable        2. continue to monitor      5. HTN/HLD/Hypotension-- sepsis vs hemorrhagic shock        1. resolved      6. Chronic hypoxic respiratory failure/COPD on 6 L home O2      1. continue duonebs every 4 hours as need it for wheezing      7. Severe Protein Calorie Malnutrition/Grade D  Esophagitis      1. 9/25 EGD: Moderately severe LA Grade D esophagitis with circumferential ulceration and exudate not actively bleeding was found. Coffee grounds in esophagus.  Scattered moderate inflammation and scattered hematin was found in the stomach. No signs of active bleeding observed. No bleeding lesions. The examined duodenum showed erosive peptic duodenitis in setting of melanosis, mainly deep erosions but no vessel or pigmented spots.     8. ESRD on HD (MWF)      1. Renal following      9.  Hypoglycemia       1. resolved     10. Prophylaxis        1. On hold due to anemia       Disposition: Patient with complex medical condition and recent surgical interventions with hx of heart failure, recurrent anemia requiring transfusions and has developed antibibodies. Transfusion delay due to antibodies work up at this time but her baseline is between 7-8 and today she is 4.5 hemoglobin. Family is in agreement of emergency transfusion of one unit and is aware of the risks and they also aware of the risk of worsening anemia/ischemia. Anticipated discharge > 2 days     GEORGETTE FIELDS spent 50 minutes in the professional and overall care of this patient.      Georgette Ibrahim DO

## 2023-10-03 NOTE — PROGRESS NOTES
Music Therapy Note    Kesha Lowe was referred by     Therapy Session  Referral Type: New referral this admission  Visit Type: New visit  Session Start Time: 1115  Session End Time: 1120  Intervention Delivery: In-person  Conflict of Service: None  Number of family members present: 1  Family Present for Session: Parent/Guardian  Family Participation: Supportive    Pre-assessment  Unable to Assess Reason: Physical limitation  Mood/Affect: Tired/lethargic  Verbalized Emotional State:  (Unable to assess)    Pain Assessment  Pain Type: Chronic pain  Pain Location: Coccyx  Pain Frequency: Constant/continuous  Clinical Progression: Other (Comment) (pt sleeping, no pain reported at this time.)  Pain Interventions: Repositioned, Medication (See MAR)  Response to Interventions: asleep    Treatment/Interventions  Music Therapy Interventions: Assessment    Post-assessment  Pain Score: 0 - No pain  Lance-Baker FACES Pain Rating: Hurts whole lot  Total Session Time (min): 5 minutes    Narrative  Assessment Detail: Patient found lying in bed sleeping. Mother at bedside; Receptive to beneftis of music therapy services and reported patient enjoys music. Amanda is a music therapist in [place] and [id number] to play music for patient over the weekend.Mother stated patient would enjoy instrumental relaxing music via guitar or keyboard. Suggested [facility] provide services in the late afternoon.  Follow-up: [facility] will follow up and provide services for patient as requested by mother accordingly    Education Documentation  No documentation found.

## 2023-10-03 NOTE — PROGRESS NOTES
Kesha Lowe is a 53 y.o. female on day 11 of admission presenting with No Principal Problem: There is no principal problem currently on the Problem List. Please update the Problem List and refresh..    Subjective   Pt resting in bed with family at bedside. She states a little sob, nausea without emesis, denies diarrhea, fever, cough, chills, chest pains, has generalized pain       Objective     Physical Exam  Vitals reviewed.   Constitutional:       Appearance: She is ill-appearing.   Cardiovascular:      Rate and Rhythm: Tachycardia present.      Comments: Sinus rhythm with hr =100  Pulmonary:      Breath sounds: Wheezing present.      Comments: Exp  O2 4L    Abdominal:      General: There is distension.   Genitourinary:     Comments: anuric  Skin:     General: Skin is warm and dry.   Neurological:      Mental Status: She is alert.      Motor: Weakness present.   Psychiatric:         Mood and Affect: Mood normal.         Behavior: Behavior normal.       Last Recorded Vitals  Blood pressure 176/74, pulse 98, temperature 37 °C (98.6 °F), resp. rate 18, height 1.524 m (5'), weight (!) 44.6 kg (98 lb 5.2 oz), SpO2 99 %.  Intake/Output last 3 Shifts:  I/O last 3 completed shifts:  In: 1900 (42.6 mL/kg) [I.V.:400 (9 mL/kg); Blood:350; Other:610; NG/GT:540]  Out: 900 (20.2 mL/kg) [Other:900]  Weight: 44.6 kg     Scheduled medications  daptomycin, 350 mg, intravenous, q48h  [START ON 10/4/2023] epoetin deisi or biosimilar, 20,000 Units, intravenous, Once per day on Mon Wed Fri  ergocalciferol, 8,000 Units, oral, Daily  HYDROmorphone, 1 mg, oral, TID  insulin lispro, 0-5 Units, subcutaneous, q6h  lidocaine, 1 patch, transdermal, q24h  melatonin, 5 mg, oral, Nightly  pantoprazole, 40 mg, intravenous, BID  piperacillin-tazobactam, 2.25 g, intravenous, q8h  sod phos di, mono-K phos mono, 250 mg, oral, 4x daily      Continuous medications     PRN medications  PRN medications: dextrose, dextrose, glucagon,  HYDROmorphone            Results for orders placed or performed during the hospital encounter of 09/22/23 (from the past 24 hour(s))   POCT GLUCOSE   Result Value Ref Range    POCT Glucose 102 (H) 74 - 99 mg/dL   CBC   Result Value Ref Range    WBC 23.0 (H) 4.4 - 11.3 x10*3/uL    nRBC 2.4 (H) 0.0 - 0.0 /100 WBCs    RBC 1.74 (L) 4.00 - 5.20 x10*6/uL    Hemoglobin 5.6 (LL) 12.0 - 16.0 g/dL    Hematocrit 17.1 (L) 36.0 - 46.0 %    MCV 98 80 - 100 fL    MCH 32.2 26.0 - 34.0 pg    MCHC 32.7 32.0 - 36.0 g/dL    RDW 17.0 (H) 11.5 - 14.5 %    Platelets 102 (L) 150 - 450 x10*3/uL    MPV 12.4 (H) 7.5 - 11.5 fL   Renal Function Panel   Result Value Ref Range    Glucose 91 74 - 99 mg/dL    Sodium 145 136 - 145 mmol/L    Potassium 3.7 3.5 - 5.3 mmol/L    Chloride 104 98 - 107 mmol/L    Bicarbonate 27 21 - 32 mmol/L    Anion Gap 18 10 - 20 mmol/L    Urea Nitrogen 22 6 - 23 mg/dL    Creatinine 1.85 (H) 0.50 - 1.05 mg/dL    eGFR 32 (L) >60 mL/min/1.73m*2    Calcium 9.3 8.6 - 10.6 mg/dL    Phosphorus 1.4 (L) 2.5 - 4.9 mg/dL    Albumin 2.8 (L) 3.4 - 5.0 g/dL   POCT GLUCOSE   Result Value Ref Range    POCT Glucose 91 74 - 99 mg/dL   Prepare RBC   Result Value Ref Range    PRODUCT CODE I3651F99     Unit Number [id number]     Unit ABO O     Unit RH POS     Dispense Status PI     Blood Expiration Date October 06, 2023 23:59 EDT     PRODUCT BLOOD TYPE 5100     UNIT VOLUME 350     PRODUCT CODE T4119Y47     Unit Number [id number]     Unit ABO O     Unit RH POS     Dispense Status PI     Blood Expiration Date October 05, 2023 23:59 EDT     PRODUCT BLOOD TYPE 5100     UNIT VOLUME 350     XM INTEP COMP     XM INTEP COMP    Protime-INR   Result Value Ref Range    Protime 17.0 (H) 9.8 - 12.8 seconds    INR 1.5 (H) 0.9 - 1.1   Lactate dehydrogenase   Result Value Ref Range     84 - 246 U/L   Haptoglobin   Result Value Ref Range    Haptoglobin <30 (L) 30 - 200 mg/dL   POCT GLUCOSE   Result Value Ref Range    POCT Glucose 115 (H) 74  - 99 mg/dL   POCT GLUCOSE   Result Value Ref Range    POCT Glucose 101 (H) 74 - 99 mg/dL                      Assessment/Plan   Active Problems:    ESRD (end stage renal disease) on dialysis (CMS/[facility])    -ESRD-HD admitted with AVG malfunction s/p excision      Tolerated hemodialysis with net fluid loss 500cc    Is hemodynamically stable, euvolemic on exam and has stable electrolytes      [facility]     Access: RT neck IJ- no issues - able to achieve   Rt arm fistula with kerlix/acewrap     Anemia of ESRD:   will cont to monitor     CKD-MBD: will cont to monitor     Plan HD tomorrow with UF as tolerated     Renal diet      Please obtain daily standing wt (if possible)     Medication to be adjusted for ESRD      Patient to continue regular HD schedule while inpatient and to follow with the outpatient nephrologist at discharge      per primary team        CHAPINCITO Pritchett-CNP

## 2023-10-03 NOTE — PROGRESS NOTES
"Subjective   Patient ID: Kesha Lowe is a 53 y.o. female.     Chief Complaint   Patient presents with    Other     CONSULT.     Evaluation of patient on dialysis at [address] [address] on 9/22/2023     Current Prescription:  No therapy plan of the specified type found.    HPI     Seen during HD ; no c/o ; pain is controlled      Recent Labs  I have reviewed the patient's most recent labs as listed below:    CKD/MBD  Calcium   Date Value Ref Range Status   10/02/2023 9.6 8.6 - 10.6 mg/dL Final   09/30/2023 8.7 8.6 - 10.6 mg/dL Final   09/30/2023 8.9 8.6 - 10.6 mg/dL Final     Sodium   Date Value Ref Range Status   10/02/2023 142 136 - 145 mmol/L Final     Creatinine   Date Value Ref Range Status   10/02/2023 2.74 (H) 0.50 - 1.05 mg/dL Final   09/30/2023 1.48 (H) 0.50 - 1.05 mg/dL Final   09/30/2023 1.48 (H) 0.50 - 1.05 mg/dL Final       Anemia  No results found for: \"HGBPLASMA\"  No results found for: \"TRANSFERRIN\", \"FERRITIN\", \"IRON\"    Potassium  Potassium   Date Value Ref Range Status   10/02/2023 4.2 3.5 - 5.3 mmol/L Final   09/30/2023 3.8 3.5 - 5.3 mmol/L Final   09/30/2023 3.9 3.5 - 5.3 mmol/L Final       Nutrition:  No components found for: \"ALB\"    Adequacy:  Hemodialysis KT/V:FLOW([id number]:LAST:1)@     Past Medical History  She has a past medical history of Personal history of other diseases of the circulatory system (07/24/2014), Personal history of other diseases of urinary system, and Personal history of pneumonia (recurrent) (04/18/2019).    Surgical History  She has a past surgical history that includes Other surgical history (04/18/2019) and US guided abdominal paracentesis (4/21/2021).     Social History  She has no history on file for tobacco use, alcohol use, and drug use.    Family History  No family history on file.     Allergies  Vancomycin, Benazepril, Carvedilol, and Doxazosin    Review of Systems  All reviewed and negative except HPI    "   Physical Exam  General Appearance:    Alert, cooperative, no distress, appears stated age   Head:    Normocephalic, without obvious abnormality, atraumatic   Eyes:    conjunctiva/corneas clear, EOM's intact,    Nose:   Nares normal, septum midline, mucosa normal, no drainage    or sinus tenderness   Throat:   Lips, mucosa, and tongue normal   Neck:   Supple, symmetrical, trachea midline, no adenopathy;        thyroid:  No enlargement/tenderness/nodules; no carotid    bruit or JVD   Lungs:     Clear to auscultation bilaterally, respirations unlabored   Chest wall:    No tenderness or deformity   Heart:    Regular rate and rhythm, S1 and S2 normal, no murmur, rub    or gallop   Abdomen:     Soft, non-tender, bowel sounds active all four quadrants,     no masses, no organomegaly   Extremities:   Extremities normal, atraumatic, no cyanosis or edema   Skin:   Skin color, texture, turgor normal, no rashes or lesions   Neurologic:   No gross abnormality, no asterixis    Dialysis access : Right internal jugular TC     Last Recorded Vitals  Blood pressure 144/55, pulse (!) 124, temperature 37.1 °C (98.8 °F), resp. rate 17, height 1.524 m (5'), weight (!) 44.6 kg (98 lb 5.2 oz), SpO2 99 %.    Relevant Results  /55   Pulse (!) 124   Temp 37.1 °C (98.8 °F)   Resp 17   Ht 1.524 m (5')   Wt (!) 44.6 kg (98 lb 5.2 oz)   SpO2 99%   BMI 19.20 kg/m²     Lab Results   Component Value Date    WBC 21.9 (H) 10/02/2023    HGB 4.5 (LL) 10/02/2023    HCT 13.9 (L) 10/02/2023    PLT 80 (L) 10/02/2023    CHOL 140 10/27/2019    TRIG 129 09/03/2023    HDL 72.6 10/27/2019    ALT 6 (L) 09/27/2023    AST 5 (L) 09/27/2023     10/02/2023    K 4.2 10/02/2023     10/02/2023    CREATININE 2.74 (H) 10/02/2023    BUN 36 (H) 10/02/2023    CO2 26 10/02/2023    TSH 19.32 (H) 09/26/2023    INR 1.3 (H) 09/30/2023    HGBA1C 4.4 09/18/2023     Scheduled medications  daptomycin, 350 mg, intravenous, q48h  ergocalciferol, 8,000 Units,  oral, Daily  insulin lispro, 0-5 Units, subcutaneous, q6h  lidocaine, 1 patch, transdermal, q24h  melatonin, 3 mg, oral, Nightly  pantoprazole, 40 mg, intravenous, BID  piperacillin-tazobactam, 2.25 g, intravenous, q8h      Continuous medications     PRN medications  PRN medications: dextrose, dextrose, glucagon, HYDROmorphone       Assessment/Plan     Seen during hemodialysis   ESRD-HD admitted with AVG malfunction s/p excision     RECOMMENDATIONS:  Renal diet   Daily renal multivitamin   Phosphate binder : not needed   Supplemental vitamin D3 and active vitamin D (Zemplar) per HD protocol   Erythropoietin: nephro will add   Plan to continue with three times a week dialysis while inpatient ; transfusion per primary team       Yana Mcmullen MD MPH

## 2023-10-03 NOTE — PROGRESS NOTES
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     Cardiology consulted in light of echocardiogram findings of severe TR, restricted TV leaflet mobility and severely elevated RVSP. .    Subjective   ST 100s  WBC 23  Hgb 5.6   \"I'm not doing well today\"  Unable to report any specific c/o     Objective     Physical Exam  Frail appearing and thin, 4L nc, NAD  Right DHT with TF at 45ml/hr  Right neck trialysis cath  Reduced A/E, rales bibasilar  RRR, systolic murmur   JVD ?midneck at 45 degrees   Abd soft, non distended   Warm and dry  RUE with ACE wrap      Last Recorded Vitals  Blood pressure 154/60, pulse 97, temperature 36.9 °C (98.4 °F), temperature source Temporal, resp. rate 14, height 1.524 m (5'), weight (!) 44.6 kg (98 lb 5.2 oz), SpO2 99 %.  Intake/Output last 3 Shifts:  I/O last 3 completed shifts:  In: 1900 (42.6 mL/kg) [I.V.:400 (9 mL/kg); Blood:350; Other:610; NG/GT:540]  Out: 900 (20.2 mL/kg) [Other:900]  Weight: 44.6 kg            Assessment/Plan   Active Problems:    ESRD (end stage renal disease) on dialysis (CMS/[facility], 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     Cardiology consulted in light of echocardiogram findings of severe TR, restricted TV leaflet mobility and severely elevated RVSP.   Per echo report: Compared with echo study from 9/6/2023, the technique and dedicated RV images are different. In today's study the degree " of tricuspid regurgitation is severe compared with moderate and the RVSP is now severely increased at 143 compared to moderate to severe on the prior study. The LVEF is now hyperdynamic with EF 65-70% compared to mildly reduced (45%).  Last HD session prior to the most recent echocardiogram was on 9/22 and noted to have only half sessions due to hemodynamic instability in the setting of sepsis.      # Severe TR  # Severe pHTN  - Likely secondary in the setting of severe pHTN (COPD on home oxygen, pulmonary hemosiderosis and prior pulmonary insults, HFpEF and volume overload in the setting of ESRD). Patient is hypervolumic in the setting of missed HD sessions and incomplete HD sessions lately   - Echo showing TV annular dilatation consistent with secondary/functional TR in the setting of RV pressure and volume overload. TV leaflet thickening and restricted mobility also noted which is also contributing to her severe TR    # Severe concentric LVH and RV hypertrophy  On further review of the echocardiogram images, there is severe LV wall thickening consistent with severe concentric LVH as well as increased RV wall thickening and valvular thickening. Will need to rule out infiltrative cardiac disease such as amyloid with cMRI though those changes could be seen with severe systemic hypertension and pHTN.       Recommendations:  - Patient will need aggressive optimization of her volume status with HD   - No current indication for surgical intervention on her TV given this is likely secondary/function   - Will need to repeat echocardiogram once patient euvolemic to re-assess degree of TR  - Please obtain repeat 2V CXR       Cardiology will follow  Case discussed with CHAPINCITO Jack-CNP  Cardiology Consults    Please call with any questions  Pager [phone] M-F 8a-5p; Saturday 8a-2p  Pager [phone] all other times

## 2023-10-03 NOTE — PROGRESS NOTES
Music Therapy Note    Kesha Lowe was referred by     Therapy Session  Referral Type: New referral this admission  Visit Type: New visit  Session Start Time: 1115  Session End Time: 1120  Intervention Delivery: In-person  Conflict of Service: None  Number of family members present: 1  Family Present for Session: Parent/Guardian  Family Participation: Supportive    Pre-assessment  Unable to Assess Reason: Physical limitation  Mood/Affect: Tired/lethargic  Verbalized Emotional State:  (Unable to assess)    Pain Assessment  Pain Type: Chronic pain  Pain Location: Coccyx  Pain Frequency: Constant/continuous  Clinical Progression: Other (Comment) (pt sleeping, no pain reported at this time.)  Pain Interventions: Repositioned, Medication (See MAR)  Response to Interventions: asleep    Treatment/Interventions  Music Therapy Interventions: Assessment    Post-assessment  Pain Score: 0 - No pain  Lance-Baker FACES Pain Rating: Hurts whole lot  Total Session Time (min): 5 minutes    Narrative  Assessment Detail: Patient found lying in bed sleeping. Mother at bedside; Receptive to beneftis of music therapy services and reported patient enjoys music. Amanda is a music therapist in [place] and [email or web address] to play music for patient over the weekend.Mother stated patient would enjoy instrumental relaxing music via guitar or keyboard. Suggested [facility] provide services in the late afternoon.  Follow-up: [facility] will follow up and provide services for patient as requested by mother accordingly    Education Documentation  No documentation found.

## 2023-10-03 NOTE — PROGRESS NOTES
Kesha Lowe is a 53 y.o. female on day 11 of admission presenting with No Principal Problem: There is no principal problem currently on the Problem List. Please update the Problem List and refresh..      Subjective   O/N - TABATHA, VSS. lying in bed during rounds.       Objective     Last Recorded Vitals  /58   Pulse 99   Temp 37 °C (98.6 °F)   Resp 17   Wt (!) 44.6 kg (98 lb 5.2 oz)   SpO2 (!) 88%   Intake/Output last 3 Shifts:    Intake/Output Summary (Last 24 hours) at 10/3/2023 0742  Last data filed at 10/3/2023 0040  Gross per 24 hour   Intake 1150 ml   Output 900 ml   Net 250 ml     Physical Exam  Constitutional: Cachectic AA female. Lying in bed.   Head: NC/AT, dobhoff in place running TFs at 45cc/hr  Eyes: Sclera clear. Eyes moving out of sync with R. eye persistently abducted  Respiratory/Thorax: Breathing shallowly on 4L NC, satting in 90s  Cardiovascular: RRR on monitor  Gastrointestinal: Soft, nontender, nondistended.  Extremities: RUE with betadine over incision, telfa overlying, and Kerlex and ACE wrap around RUE at RUE AVG site Palpable RUE radial pulse, warm hand, improving edema towards fingers/palm, up to wrist. Weak R. motor on RUE.      Assessment/Plan  Kesha Lowe is a pleasant 53 y.o. female who underwent right upper extremity AVG excision on 9/29/2023. She is recovering appropriately. The right arm incisions look good.      Plan:  Continue antibiotics for 2 weeks, at minimum.   Nurse to change dressing daily starting 10/3 with betadine over RUE incisions, dry gauze/telfa, kerlex and ace wrap.   No other vascular intervention needed at this time. Will likely need tunneled HD catheter placed by IR this week.   Rest of care per primary team      Patient was seen with fellow, Dr. Morrissey and discussed with attending, Dr. Hartmann.    Sandi Hong MD, MSc  Vascular Surgery  Sb/Bonnie, [id number]

## 2023-10-03 NOTE — PROGRESS NOTES
Per T[facility], patient will be discharging to [facility] and will receive HD on site. Clinicals were uploaded into [facility] for their review. Updated [facility] that Hep B panel will need to be ordered and she will notify MD. Will monitor referral in [facility] for any additional information that is needed.     UPDATE 1358 HD Flowsheets/HD order were uploaded into [facility]. Awaiting updated CBC and Hep B panel.    -Thu LICEA, MA, LSW

## 2023-10-03 NOTE — CARE PLAN
Problem: Pain  Goal: My pain/discomfort is manageable  Outcome: Progressing     Problem: Daily Care  Goal: Daily care needs are met  Outcome: Progressing   The patient's goals for the shift include helping staff assist with bed mobility.     The clinical goals for the shift include monitor for need of blood transfusion.      Over the shift, the patient did not make progress toward the following goals. Barriers to progression include altered mental status. Recommendations to address these barriers include continue to educate and reinforce directions in simple terms that patient can easily understand.

## 2023-10-03 NOTE — PROGRESS NOTES
10/3/23 1200 Transitional Care Coordinator Notes:    Patient still anemic, hemoglobin was 5.6 today. MD informed to order a Hep B panel for authorization for dialysis at [facility]. Updated notes sent to facility.                    Assessment/Plan   Active Problems:    ESRD (end stage renal disease) on dialysis (CMS/[facility])               Carmen Mckeon RN

## 2023-10-03 NOTE — CONSULTS
Reason For Consult  Thrombocytopenia     History Of Present Illness  Kesha Lowe is a 53 y.o. 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 She then presented to [facility] initially 9/2/23 in acute on chronic hypercapnic respiratory failure/septic shock requiring intubation and admission to ICU level care. She had only received half sessions of dialysis in the intervening 4 days between admissions and needed urgent dialysis on presentation. Of note, once stabilized and transferred to SDU, patient left AMA, made it to the parking lot and became significantly dyspneic and returned back to the ED. She was given fluid and admitted back to SDU for further evaluation and care. Patient was admitted to [facility] on 9/22/2023 as a transfer from [facility] following episode of bleeding from RUE AVG at dialysis. Exposed graft, large skin defect. Graft ligated by IR at [facility] and transferred to [facility] for further evaluation and management by vascular surgery. Now s/p debridement of grade 4 sacral wound with general surgery on 9/26 and s/p explant of RUE AV graft with vascular surgery on 9/29.          Past Medical History   HTN, HLD, ESRD (MWF RUE AVG), hypothyroid, HFpEF (~70% LVEF), LUISITO, COPD (6L NC), aortic regurg, aortic stenosis, hx DAH, bacteremia, multiple AMA discharges, noncompliant with  care, numerous admissions for missed dialysis sessions     Surgical History  RUE brach-ax graft placed by Dr. Gan 8-10 yrs ago      Social History  3-4 cigars daily; no drug or alcohol use     Family History  No family  history on file.     Allergies  Vancomycin, Benazepril, Carvedilol, and Doxazosin    Review of Systems  As above; patient disoriented on examination     Physical Exam  General: Cachetic appearing, lying in bed, awake but no verbal responses during exam   Head and neck: atraumatic, normocephalic  ENT: Dry mucous membranes, nasal cannula and NG tube in place  CV: grade II systolic murmur, regular rate. 2+ peripheral pulses  PULM: clear to anterior auscultation, NC in place.  ABDOMINAL: Soft, non-distended. No masses or organomegaly  SKIN: Warm and dry  EXTREMITIES: No lower extremity edema. Reduced muscle mass.      Last Recorded Vitals  Blood pressure 154/60, pulse 97, temperature 36.9 °C (98.4 °F), temperature source Temporal, resp. rate 14, height 1.524 m (5'), weight (!) 44.6 kg (98 lb 5.2 oz), SpO2 99 %.    Relevant Results  Lab Results   Component Value Date    WBC 23.0 (H) 10/03/2023    HGB 5.6 (LL) 10/03/2023    HCT 17.1 (L) 10/03/2023    MCV 98 10/03/2023     (L) 10/03/2023      Lab Results   Component Value Date    GLUCOSE 91 10/03/2023    CALCIUM 9.3 10/03/2023     10/03/2023    K 3.7 10/03/2023    CO2 27 10/03/2023     10/03/2023    BUN 22 10/03/2023    CREATININE 1.85 (H) 10/03/2023      Lab Results   Component Value Date    ALT 6 (L) 09/27/2023    AST 5 (L) 09/27/2023    ALKPHOS 157 (H) 09/27/2023    BILITOT 2.1 (H) 09/27/2023      Lab Results   Component Value Date    TSH 19.32 (H) 09/26/2023           Assessment/Plan     Kesha Christensen is a 54 yo F with a PMHx of ESRD on HD, COPD, HFmrEF, pulmonary hemosiderosis, HTN, cirrhosis, who presented from [facility] for bleeding RUE AVG now POD 4 uncomplicated explant of RUE AV graft. Hematology is consulted for evaluation of thrombocytopenia. Patient has longstanding anemia in the outpatient setting (Hgs at high 7s at baseline), likely 2/2 anemia of chronic disease and ESRD. She has received multiple blood transfusions in the past. 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 Yesterday she was transfused with unmatched RBC which were subsequently cross-matched without agglutination. Examination of her smear showed few spherocytes, with <1 fragmented RBC per high power field and few large platelets. Her anemia is likely multifactorial, with etiologies including chronic disease, ESRD, however additional workup with hemolysis lab and consideration of a primary marrow process are necessary.     Her thrombocytopenia is relatively acute since her hospitalizations beginning in September, with frequent platelets as low as 49. Her platelet count improved to 102 today after administration of 125 mg solumedrol yesterday s/p transfusion. Infectious and nutritional etiologies of her thrombocytopenia have been evaluated and excluded including viral testing and normal folate and B12 levels. Potential causes of her thrombocytopenia include drug-related (antibiotics including zosyn and daptomycin may be a source), and her known liver cirrhosis. We recommend further evaluation including laboratory studies of HIT, haptoglobin, LDH, fibrinogen, ferritin, and d-dimer.     Plan:  #Anemia  -patient has anti-HTLA antibody which cross-reacts with testing media; cross-matched products have been delivered without agglutination   -added on reticulocyte count   -please obtain haptoglobin, LDH, fibrinogen, ferritin, and d-dimer     #Thrombocytopenia   -please obtain repeat HIT assay, coagulation studies    Patient seen and discussed with Dr. Hayley CROW

## 2023-10-03 NOTE — PROGRESS NOTES
Kesha Lowe is a 53 y.o. female on day 11 of admission presenting with encephalopathy, fistula malfunction, GI bleed, anemia, thrombocytopenia    Subjective   Patient was seen and examined at bedside. Patient is awake, waxing and waning confusion.     Objective     Physical Exam:    Constitutional: Cachectic. A&Ox1  HEENT: dry oral mucosa. Dobhoff in place   CV: RRR, Grade 3 systolic murmur  PULM: CTAB, on 2L NC  ABDOMEN: Soft, NT/ND. No TTP. + BSx4.  SKIN: Pale, Warm, Dry, No Rashes, sacral pressure ulcer  EXTREMITIES: Non-Tender, Full ROM, No Pedal Edema, RUE with bandage  NEURO: A&O x 1, nonfocal neurological exam    Last Recorded Vitals  Blood pressure 176/74, pulse 98, temperature 37 °C (98.6 °F), resp. rate 18, height 1.524 m (5'), weight (!) 44.6 kg (98 lb 5.2 oz), SpO2 99 %.  Intake/Output last 3 Shifts:  I/O last 3 completed shifts:  In: 1900 (42.6 mL/kg) [I.V.:400 (9 mL/kg); Blood:350; Other:610; NG/GT:540]  Out: 900 (20.2 mL/kg) [Other:900]  Weight: 44.6 kg     Relevant Results  Scheduled medications  daptomycin, 350 mg, intravenous, q48h  [START ON 10/4/2023] epoetin deisi or biosimilar, 20,000 Units, intravenous, Once per day on Mon Wed Fri  ergocalciferol, 8,000 Units, oral, Daily  HYDROmorphone, 1 mg, oral, TID  insulin lispro, 0-5 Units, subcutaneous, q6h  lidocaine, 1 patch, transdermal, q24h  melatonin, 5 mg, oral, Nightly  pantoprazole, 40 mg, intravenous, BID  piperacillin-tazobactam, 2.25 g, intravenous, q8h  sod phos di, mono-K phos mono, 250 mg, oral, 4x daily      Continuous medications     PRN medications  PRN medications: dextrose, dextrose, glucagon, HYDROmorphone         Results for orders placed or performed during the hospital encounter of 09/22/23 (from the past 24 hour(s))   POCT GLUCOSE   Result Value Ref Range    POCT Glucose 102 (H) 74 - 99 mg/dL   CBC   Result Value Ref Range    WBC 23.0 (H) 4.4 - 11.3 x10*3/uL    nRBC 2.4 (H) 0.0 - 0.0 /100 WBCs    RBC 1.74 (L) 4.00  - 5.20 x10*6/uL    Hemoglobin 5.6 (LL) 12.0 - 16.0 g/dL    Hematocrit 17.1 (L) 36.0 - 46.0 %    MCV 98 80 - 100 fL    MCH 32.2 26.0 - 34.0 pg    MCHC 32.7 32.0 - 36.0 g/dL    RDW 17.0 (H) 11.5 - 14.5 %    Platelets 102 (L) 150 - 450 x10*3/uL    MPV 12.4 (H) 7.5 - 11.5 fL   Renal Function Panel   Result Value Ref Range    Glucose 91 74 - 99 mg/dL    Sodium 145 136 - 145 mmol/L    Potassium 3.7 3.5 - 5.3 mmol/L    Chloride 104 98 - 107 mmol/L    Bicarbonate 27 21 - 32 mmol/L    Anion Gap 18 10 - 20 mmol/L    Urea Nitrogen 22 6 - 23 mg/dL    Creatinine 1.85 (H) 0.50 - 1.05 mg/dL    eGFR 32 (L) >60 mL/min/1.73m*2    Calcium 9.3 8.6 - 10.6 mg/dL    Phosphorus 1.4 (L) 2.5 - 4.9 mg/dL    Albumin 2.8 (L) 3.4 - 5.0 g/dL   POCT GLUCOSE   Result Value Ref Range    POCT Glucose 91 74 - 99 mg/dL   Prepare RBC   Result Value Ref Range    PRODUCT CODE X2727O97     Unit Number [id number]     Unit ABO O     Unit RH POS     Dispense Status PI     Blood Expiration Date October 06, 2023 23:59 EDT     PRODUCT BLOOD TYPE 5100     UNIT VOLUME 350     PRODUCT CODE Q0030W58     Unit Number [id number]     Unit ABO O     Unit RH POS     Dispense Status PI     Blood Expiration Date October 05, 2023 23:59 EDT     PRODUCT BLOOD TYPE 5100     UNIT VOLUME 350     XM INTEP COMP     XM INTEP COMP    Protime-INR   Result Value Ref Range    Protime 17.0 (H) 9.8 - 12.8 seconds    INR 1.5 (H) 0.9 - 1.1   Lactate dehydrogenase   Result Value Ref Range     84 - 246 U/L   Haptoglobin   Result Value Ref Range    Haptoglobin <30 (L) 30 - 200 mg/dL   POCT GLUCOSE   Result Value Ref Range    POCT Glucose 115 (H) 74 - 99 mg/dL   POCT GLUCOSE   Result Value Ref Range    POCT Glucose 101 (H) 74 - 99 mg/dL            Assessment/Plan    ESRD (end stage renal disease) on dialysis (CMS/[facility])  VASQUEZ PAKE is a 53 year old Female with PMH ESRD on HD MWF via RUE AVF, COPD on home O2 6L, 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 on 9/22/2023 for bleeding RUE AVG. Pt had HD at OSH on 9/22 and had complete session. Towards end of HD session, RUE AVG was bleeding excessively. Graft was ligated on 9/22 and pt transferred to [facility] to have graft removed. On 9/23, pt was scheduled for OR graft explant but case cancelled as pt was hypoglycemic, hypotensive, and altered so transferred to CTICU on 9/23 and transferred to the floor 10/02/23     PLAN:  Acute on chronic anemia        1. Patient received one unit of uncrossed matched PRBC        2. Still waiting on [facility] cross for her blood       2. Acute delirium in the setting of prolonged hospitalization, narcotics     1. continue to monitor      3. Right upper AVG bleeding s/p explant and sacral decubitus ulcer debridment      1. Stable       2. Vascular is requesting to continue antibiotics for 2 weeks       3. Patient is on dilaudid 0.4 mg IVP q 4 hours for pain and palliative has recommended dilaudid 1 mg oral liquid TID       4. Palliative care following, appreciate recs.       5. NPO after midnight for HD tunneled      3.. HFpEF/Tricuspid regurg/R sided heart failure      1. Appreciate cards recommendation-will follow up outpatient      4. Acute Thrombocytopenia-multifactorial       1. Hematology consulted and appreciate recs        2. Work up has been sent       5. HTN/HLD/Hypotension-- sepsis vs hemorrhagic shock        1. resolved      6. Chronic hypoxic respiratory failure/COPD on 6 L home O2      1. continue duonebs every 4 hours as need it for wheezing      7. Severe Protein Calorie Malnutrition/Grade D Esophagitis      1. 9/25 EGD: Moderately severe LA Grade D esophagitis with circumferential ulceration and exudate not actively bleeding was found. Coffee grounds in esophagus.  Scattered moderate inflammation and scattered hematin was found in the stomach. No signs of active bleeding observed. No bleeding lesions. The examined duodenum showed erosive peptic  duodenitis in setting of melanosis, mainly deep erosions but no vessel or pigmented spots.     8. ESRD on HD (MWF)      1. Renal following      9.  Hypoglycemia       1. resolved      10. Prophylaxis        1. On hold due to anemia       Disposition: Pending Cross matched work up to be complete for patient to be transfuse. Appreciate hematology recs. She will be NPO after midnight for HD tunneled placement.  Anticipated discharge > 2 days      DOC HALO GEORGETTE MASSEY         I spent 30 minutes in the professional and overall care of this patient.      Georgette Ibrahim DO       I spent 30 minutes in the professional and overall care of this patient.      Georgette Ibrahim DO

## 2023-10-03 NOTE — PROGRESS NOTES
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 Patient underwent RUE AVG excision 09/29/23 complicated by blood loss s/p 1U pRBCs. Palliative Care was consulted to assist with medical decision making.     Subjective:  Today patient is mildly communicative but does endorse 10/10 back pain, generalized pain but mainly located at the sacral wound. Reports the pain is constant. Also endorses fatigue. Reports she has difficulty falling asleep and staying asleep. Spoke with her mother about next steps for the patient.     Symptoms  [place] Symptom Assessment Scores  Pain Score: 10    Patient ID: Kesha Lowe is a 53 y.o. female who presents for Other (CONSULT.).    Palliative Care Social History  Social History: Living situation home with   Samaritan/Cultural/Spiritual: identified spiritual needs and/or concerns: patient is spiritual, Mandaeism (non-Amish), patient minimally responsive but mother would be open to speaking to a     Prior Hospitalizations: multiple  History obtained from: chart review    ROS was difficult to obtain due to patient being minimally responsive.  Review of Systems   Constitutional:  Positive for fatigue.   Respiratory:  Negative for shortness of breath.    Cardiovascular:  Negative for chest pain.   Gastrointestinal:  Negative for abdominal pain, constipation, diarrhea, nausea and vomiting.   Musculoskeletal:  Positive for back pain and myalgias.   Skin:  Positive for wound.   Neurological:  Positive for weakness. Negative for headaches.   Psychiatric/Behavioral:  Positive for confusion and sleep disturbance. The patient is nervous/anxious.        Objective   Physical Exam  Constitutional:       Appearance: She is  underweight. She is ill-appearing.   HENT:      Head: Normocephalic and atraumatic.      Mouth/Throat:      Mouth: Mucous membranes are dry.   Eyes:      General: No scleral icterus.     Comments: proptosis   Cardiovascular:      Rate and Rhythm: Tachycardia present.      Heart sounds: Murmur heard.      Comments: Systolic ejection murmur present  Pulmonary:      Breath sounds: Rhonchi present.   Abdominal:      General: Abdomen is flat. Bowel sounds are normal.      Palpations: Abdomen is soft.      Tenderness: There is no abdominal tenderness.   Musculoskeletal:      Comments: RUE wrapped s/p AVG excision   Skin:     General: Skin is warm and dry.      Findings: Wound present.      Comments: Sacral pressure ulcers   Neurological:      Mental Status: She is lethargic.      Comments: Aox2, arousable         Assessment/Plan   Palliative Assessment      Prognosis: poor  Decisional Capacity: patient is hard to arouse, unable to assess at this time  Patient's understanding of illness: patient is hard to arouse, unable to assess at this time  Patient goals of care: previously, patient's goals of care get well enough to get home. Make sure she's as comfortable as possible.    Advance Care Planning  Advance Directives on file?: <no information>  Health Care Directive on file?: asked for documentation to be brought in  Health Care Agent:   1. Tae  2. Mother- Carlota  Advance directives- completed [facility]. Asked for documentation to be brought in.   POLST forms: There is no POLST (Physician orders for life-sustaining treatment) form on file for this patient      Allergies  Vancomycin, Benazepril, Carvedilol, and Doxazosin    Last Recorded Vitals  Blood pressure 154/60, pulse 97, temperature 36.9 °C (98.4 °F), temperature source Temporal, resp. rate 14, height 1.524 m (5'), weight (!) 44.6 kg (98 lb 5.2 oz), SpO2 99 %.    Relevant Results  Lab Results   Component Value Date    WBC 23.0 (H) 10/03/2023    HGB 5.6 (LL)  10/03/2023    HCT 17.1 (L) 10/03/2023    MCV 98 10/03/2023     (L) 10/03/2023      Lab Results   Component Value Date    GLUCOSE 91 10/03/2023    CALCIUM 9.3 10/03/2023     10/03/2023    K 3.7 10/03/2023    CO2 27 10/03/2023     10/03/2023    BUN 22 10/03/2023    CREATININE 1.85 (H) 10/03/2023      Lab Results   Component Value Date    ALT 6 (L) 09/27/2023    AST 5 (L) 09/27/2023    ALKPHOS 157 (H) 09/27/2023    BILITOT 2.1 (H) 09/27/2023        Assessment and Plan  Kesha Lowe is a 53 year old Female HD#11 with PMHx of ESRD (on HD MWF via RUE AVF), COPD (on home O2 6L, 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 Patient underwent RUE AVG excision 09/29/23 complicated by blood loss s/p 1U pRBCs. Palliative Care was consulted to assist with medical decision making.     Problem List in addition to above:   Protein energy malnutrition  Encephalopathy    Recommendations:  -recommend adding on a scheduled pain medication: dilaudid 1mg liquid TID  -recommend leaving the IV dilaudid 0.4mg Q4H for breakthrough pain  -recommend switching the scheduled melatonin to 5mg at 6PM and 5mg at bedtime scheduled    Code Status: DNR/DNI  Patient is a capable decision maker: No  Surrogate decision maker: Tae-   Estimated life expectancy Provider estimate of survival: will discuss with the attending    Symptom Management:  Pain: endorses 10/10 back pain and generalized pain   Nausea: denies  Depression/Anxiety: denies  Appetite: patient was not responsive to this questions  Dyspnea: denies  Constipation/Diarrhea: denies    Goals of Care  Most important health related goal: making the patient as comfortable as possible, previous goal was to get home with  Timmy    Functional Status  Activities of Daily Living:  Basic ADLs: (I= independent, A= assistance, D= dependent)  Bathing: D, Dressing: D,  Toileting: D, Transferring: D, Continence: D, Feeding: D        Surrogate Decision Making/Advanced Directives  Surrogate Health Care Decision Maker:  Tae PHILIP   Health Care Agent-   1. Tae  2. Mother- Carlota  Advance directives- completed [facility]. Asked for documentation to be brought in.       Caregiving/Caregiver Support  Does the patient require assistance in some or all components of his care, including coordination of medical care? Yes  If Yes, which person serves that role? , mother   Caregiver emotional or practical needs:  was not discussed at this time    Discharge and Social Considerations  Plan is to discharge patient to a SNF: [facility] and will receive HD on site.      Elizabeth Ferreira MD

## 2023-10-04 NOTE — PROGRESS NOTES
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     Cardiology consulted in light of echocardiogram findings of severe TR, restricted TV leaflet mobility and severely elevated RVSP. .    Subjective   ST 100s  Hypertensive  WBC up to 26  Hgb 5.1  Tunneled line placed today  Currently on HD    Objective     Physical Exam  Frail appearing and thin, 4L nc, NAD  Right DHT with TF at 45ml/hr  Right neck trialysis cath and right chest tunneled line  Reduced A/E, rales bibasilar  RRR, systolic murmur   JVD ?midneck at 45 degrees   Abd soft, non distended   Warm and dry  RUE with ACE wrap      Last Recorded Vitals  Blood pressure 143/54, pulse 95, temperature 36.2 °C (97.2 °F), temperature source Temporal, resp. rate 18, height 1.524 m (5'), weight (!) 44.6 kg (98 lb 5.2 oz), SpO2 95 %.  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     Cardiology consulted in light of echocardiogram findings of severe TR, restricted TV leaflet mobility and severely elevated RVSP.   Per echo report: Compared with echo study from 9/6/2023, the technique and dedicated RV images are different. In today's study the degree of  tricuspid regurgitation is severe compared with moderate and the RVSP is now severely increased at 143 compared to moderate to severe on the prior study. The LVEF is now hyperdynamic with EF 65-70% compared to mildly reduced (45%).  Last HD session prior to the most recent echocardiogram was on 9/22 and noted to have only half sessions due to hemodynamic instability in the setting of sepsis.      # Severe TR  # Severe pHTN  - Likely secondary in the setting of severe pHTN (COPD on home oxygen, pulmonary hemosiderosis and prior pulmonary insults, HFpEF and volume overload in the setting of ESRD). Patient is hypervolumic in the setting of missed HD sessions and incomplete HD sessions lately   - Echo showing TV annular dilatation consistent with secondary/functional TR in the setting of RV pressure and volume overload. TV leaflet thickening and restricted mobility also noted which is also contributing to her severe TR    # Severe concentric LVH and RV hypertrophy  On further review of the echocardiogram images, there is severe LV wall thickening consistent with severe concentric LVH as well as increased RV wall thickening and valvular thickening. Will need to rule out infiltrative cardiac disease such as amyloid with cMRI though those changes could be seen with severe systemic hypertension and pHTN.       Recommendations:  - Patient will need aggressive optimization of her volume status with HD   - No current indication for surgical intervention on her TV given this is likely secondary/function   - Will need to repeat echocardiogram once patient euvolemic to re-assess degree of TR  - Please obtain repeat 2V CXR and BNP       Cardiology will follow  Case discussed with CHAPINCITO Jack-CNP  Cardiology Consults    Please call with any questions  Pager [phone] M-F 8a-5p; Saturday 8a-2p  Pager [phone] all other times

## 2023-10-04 NOTE — NURSING NOTE
Report from Sending RN:    Report From: Cesar ( RN)  Recent Surgery of Procedure: Yes, catheter placement  Baseline Level of Consciousness (LOC): A/O x 1  Oxygen Use: Yes; 4 liters  Type: NC  Diabetic: No  Last BP Med Given Day of Dialysis: no  Last Pain Med Given: no  Lab Tests to be Obtained with Dialysis: No  Blood Transfusion to be Given During Dialysis: yes  Available IV Access: Yes  Medications to be Administered During Dialysis: Yes 1 unit RBC  Continuous IV Infusion Running: No  Restraints on Currently or in the Last 24 Hours: No  Hand-Off Communication: No acute overnight or morning events; vss; Pt did not take morning medications; No labs needed; Pt will need 1 unit RBC; Pt may travel without telemetry; Pt is a full code; Glenny Medina RN.

## 2023-10-04 NOTE — PROGRESS NOTES
Kesha Lowe is a 53 y.o. female on day 12 of admission presenting with encephalopathy, fistula malfunction, GI bleed, anemia, thrombocytopenia    Subjective   Patient was seen and examined at dialysis suit.   She had a right internal jugular tunnel line placed by IR today.     Reports no new complaints.     Objective     Physical Exam:    Constitutional: Cachectic. A&Ox1to 2,   HEENT: dry oral mucosa. Dobhoff in place   CV: RRR, Grade 3 systolic murmur  PULM: CTAB, on 2L NC  ABDOMEN: Soft, NT/ND. No TTP. + BSx4.  SKIN: Pale, Warm, Dry, No Rashes, sacral pressure ulcer  EXTREMITIES: Non-Tender, Full ROM, No Pedal Edema, RUE with bandage  NEURO: A&O x 1, moving all extremities,     Last Recorded Vitals  Blood pressure 143/54, pulse 81, temperature 36.8 °C (98.2 °F), temperature source Temporal, resp. rate 18, height 1.524 m (5'), weight (!) 44.6 kg (98 lb 5.2 oz), SpO2 95 %.  Intake/Output last 3 Shifts:  I/O last 3 completed shifts:  In: 1100 (24.7 mL/kg) [Blood:350; Other:210; NG/GT:540]  Out: - (0 mL/kg)   Weight: 44.6 kg     Scheduled medications  daptomycin, 350 mg, intravenous, q48h  epoetin deisi or biosimilar, 20,000 Units, intravenous, Once per day on Mon Wed Fri  ergocalciferol, 8,000 Units, oral, Daily  HYDROmorphone, 1 mg, oral, TID  insulin lispro, 0-5 Units, subcutaneous, q6h  lidocaine, 1 patch, transdermal, q24h  melatonin, 5 mg, oral, Nightly  pantoprazole, 40 mg, intravenous, BID  piperacillin-tazobactam, 2.25 g, intravenous, q8h      Continuous medications     PRN medications  PRN medications: dextrose, dextrose, diphenhydrAMINE **OR** diphenhydrAMINE, glucagon, HYDROmorphone, OLANZapine, OLANZapine      Relevant Results    Results for orders placed or performed during the hospital encounter of 09/22/23 (from the past 24 hour(s))   POCT GLUCOSE   Result Value Ref Range    POCT Glucose 108 (H) 74 - 99 mg/dL   POCT GLUCOSE   Result Value Ref Range    POCT Glucose 108 (H) 74 - 99 mg/dL    Prepare RBC   Result Value Ref Range    PRODUCT CODE Q7099Y96     Unit Number [id number]     Unit ABO O     Unit RH POS     Dispense Status PT     Blood Expiration Date October 06, 2023 23:59 EDT     PRODUCT BLOOD TYPE 5100     UNIT VOLUME 350     PRODUCT CODE F5233H10     Unit Number [id number]     Unit ABO O     Unit RH POS     Dispense Status RE     Blood Expiration Date October 05, 2023 23:59 EDT     PRODUCT BLOOD TYPE 5100     UNIT VOLUME 350     XM INTEP COMP     XM INTEP COMP    POCT GLUCOSE   Result Value Ref Range    POCT Glucose 82 74 - 99 mg/dL   Reticulocytes   Result Value Ref Range    Retic % 9.3 (H) 0.5 - 2.0 %    Retic Absolute 0.150 (H) 0.018 - 0.083 x10*6/uL    Reticulocyte Hemoglobin 36 28 - 38 pg    Immature Retic fraction 39.6 (H) <=16.0 %   Ferritin   Result Value Ref Range    Ferritin 3,700 (H) 8 - 150 ng/mL   CBC   Result Value Ref Range    WBC 26.6 (H) 4.4 - 11.3 x10*3/uL    nRBC 3.2 (H) 0.0 - 0.0 /100 WBCs    RBC 1.61 (L) 4.00 - 5.20 x10*6/uL    Hemoglobin 5.1 (LL) 12.0 - 16.0 g/dL    Hematocrit 16.3 (L) 36.0 - 46.0 %     (H) 80 - 100 fL    MCH 31.7 26.0 - 34.0 pg    MCHC 31.3 (L) 32.0 - 36.0 g/dL    RDW 21.3 (H) 11.5 - 14.5 %    Platelets 117 (L) 150 - 450 x10*3/uL    MPV 11.7 (H) 7.5 - 11.5 fL   Renal Function Panel   Result Value Ref Range    Glucose 51 (LL) 74 - 99 mg/dL    Sodium 148 (H) 136 - 145 mmol/L    Potassium 4.1 3.5 - 5.3 mmol/L    Chloride 105 98 - 107 mmol/L    Bicarbonate 21 21 - 32 mmol/L    Anion Gap 26 (H) 10 - 20 mmol/L    Urea Nitrogen 35 (H) 6 - 23 mg/dL    Creatinine 2.78 (H) 0.50 - 1.05 mg/dL    eGFR 20 (L) >60 mL/min/1.73m*2    Calcium 9.9 8.6 - 10.6 mg/dL    Phosphorus 2.2 (L) 2.5 - 4.9 mg/dL    Albumin 2.8 (L) 3.4 - 5.0 g/dL   Haptoglobin   Result Value Ref Range    Haptoglobin <30 (L) 30 - 200 mg/dL   Lactate dehydrogenase   Result Value Ref Range     84 - 246 U/L   D-dimer, Non VTE   Result Value Ref Range    D-Dimer Non VTE, Quant  (ng/mL FEU) 4,882 (H) <=500 ng/mL FEU   POCT GLUCOSE   Result Value Ref Range    POCT Glucose 73 (L) 74 - 99 mg/dL   POCT GLUCOSE   Result Value Ref Range    POCT Glucose 65 (L) 74 - 99 mg/dL            Assessment/Plan    ESRD (end stage renal disease) on dialysis (CMS/[facility])  VASQUEZ PAK is a 53 year old Female with PMH ESRD on HD MWF via RUE AVF, COPD on home O2 6L, 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 Pt had HD at OSH on 9/22 and completed the session. Towards end of HD session, RUE AVG was bleeding excessively. Graft was ligated on 9/22 and pt transferred to [facility] to have graft removed. On 9/23, pt was scheduled for OR graft explant but case cancelled as pt was hypoglycemic, hypotensive, and altered so transferred to CTICU on 9/23 and transferred back to the floor 10/02/23.        Acute on chronic anemia   Patient received one unit of uncrossed matched PRBC  HB is 5.1 today,   Still waiting on red cross for her blood       Acute delirium in the setting of prolonged hospitalization, narcotics  Monitor, frequent orientation,      Right upper AVG bleeding s/p explant and sacral decubitus ulcer debridment  Stable   Vascular is advising to continue antibiotics for 2 weeks     Patient is on dilaudid 0.4 mg IVP q 4 hours for pain and palliative has recommended dilaudid 1 mg oral liquid TID   Palliative care following, appreciate recs.        HFpEF/Tricuspid regurg/R sided heart failure   Appreciate cards recommendation     Acute Thrombocytopenia-multifactorial  Hematology consulted and appreciate recs        HTN/HLD/Hypotension-- sepsis vs hemorrhagic shock   resolved      Chronic hypoxic respiratory failure/COPD on 6 L home O2  continue duonebs every 4 hours as need it for wheezing      Severe Protein Calorie Malnutrition/Grade D Esophagitis  9/25 EGD: Moderately severe LA Grade D esophagitis with circumferential ulceration and  exudate not actively bleeding was found. Coffee grounds in esophagus.  Scattered moderate inflammation and scattered hematin was found in the stomach. No signs of active bleeding observed. No bleeding lesions. The examined duodenum showed erosive peptic duodenitis in setting of melanosis, mainly deep erosions but no vessel or pigmented spots.     ESRD on HD (MWF)   Renal following   S/p HD today on 10/4,      Hypoglycemia  resolved      Prophylaxis    On hold due to anemia       Disposition: Pending Cross matched work up to be complete for patient to be transfuse. Appreciate hematology recs.  Anticipated discharge > 2 days        Russell Carter MD

## 2023-10-04 NOTE — PROGRESS NOTES
Reason for Consult: Thrombocytopenia     Subjective   Patient went for HD tunneled cath today. Remains on zosyn and dapto. Platelets slightly improved from yesterday, at 117 from 102. More alert and communicative today.       Objective     Physical Exam  HENT:      Head: Normocephalic and atraumatic.      Mouth/Throat:      Mouth: Mucous membranes are dry.   Eyes:      Extraocular Movements: Extraocular movements intact.      Conjunctiva/sclera: Conjunctivae normal.      Pupils: Pupils are equal, round, and reactive to light.   Cardiovascular:      Rate and Rhythm: Normal rate and regular rhythm.      Pulses: Normal pulses.      Heart sounds: No murmur heard.     No gallop.   Pulmonary:      Effort: Pulmonary effort is normal.      Breath sounds: Normal breath sounds.   Abdominal:      General: Abdomen is flat.      Palpations: Abdomen is soft.   Lymphadenopathy:      Cervical: No cervical adenopathy.   Skin:     General: Skin is warm and dry.   Neurological:      Mental Status: She is alert.         Last Recorded Vitals  Blood pressure 143/54, pulse 81, temperature 36.8 °C (98.2 °F), temperature source Temporal, resp. rate 18, height 1.524 m (5'), weight (!) 44.6 kg (98 lb 5.2 oz), SpO2 95 %.    Relevant Results  Results for orders placed or performed during the hospital encounter of 09/22/23 (from the past 24 hour(s))   POCT GLUCOSE   Result Value Ref Range    POCT Glucose 108 (H) 74 - 99 mg/dL   POCT GLUCOSE   Result Value Ref Range    POCT Glucose 108 (H) 74 - 99 mg/dL   Prepare RBC   Result Value Ref Range    PRODUCT CODE I0711M55     Unit Number [id number]     Unit ABO O     Unit RH POS     Dispense Status PT     Blood Expiration Date October 06, 2023 23:59 EDT     PRODUCT BLOOD TYPE 5100     UNIT VOLUME 350     PRODUCT CODE J0051S41     Unit Number [id number]     Unit ABO O     Unit RH POS     Dispense Status RE     Blood Expiration Date October 05, 2023 23:59 EDT     PRODUCT BLOOD TYPE 5100      UNIT VOLUME 350     XM INTEP COMP     XM INTEP COMP    POCT GLUCOSE   Result Value Ref Range    POCT Glucose 82 74 - 99 mg/dL   Reticulocytes   Result Value Ref Range    Retic % 9.3 (H) 0.5 - 2.0 %    Retic Absolute 0.150 (H) 0.018 - 0.083 x10*6/uL    Reticulocyte Hemoglobin 36 28 - 38 pg    Immature Retic fraction 39.6 (H) <=16.0 %   Ferritin   Result Value Ref Range    Ferritin 3,700 (H) 8 - 150 ng/mL   CBC   Result Value Ref Range    WBC 26.6 (H) 4.4 - 11.3 x10*3/uL    nRBC 3.2 (H) 0.0 - 0.0 /100 WBCs    RBC 1.61 (L) 4.00 - 5.20 x10*6/uL    Hemoglobin 5.1 (LL) 12.0 - 16.0 g/dL    Hematocrit 16.3 (L) 36.0 - 46.0 %     (H) 80 - 100 fL    MCH 31.7 26.0 - 34.0 pg    MCHC 31.3 (L) 32.0 - 36.0 g/dL    RDW 21.3 (H) 11.5 - 14.5 %    Platelets 117 (L) 150 - 450 x10*3/uL    MPV 11.7 (H) 7.5 - 11.5 fL   Renal Function Panel   Result Value Ref Range    Glucose 51 (LL) 74 - 99 mg/dL    Sodium 148 (H) 136 - 145 mmol/L    Potassium 4.1 3.5 - 5.3 mmol/L    Chloride 105 98 - 107 mmol/L    Bicarbonate 21 21 - 32 mmol/L    Anion Gap 26 (H) 10 - 20 mmol/L    Urea Nitrogen 35 (H) 6 - 23 mg/dL    Creatinine 2.78 (H) 0.50 - 1.05 mg/dL    eGFR 20 (L) >60 mL/min/1.73m*2    Calcium 9.9 8.6 - 10.6 mg/dL    Phosphorus 2.2 (L) 2.5 - 4.9 mg/dL    Albumin 2.8 (L) 3.4 - 5.0 g/dL   Haptoglobin   Result Value Ref Range    Haptoglobin <30 (L) 30 - 200 mg/dL   Lactate dehydrogenase   Result Value Ref Range     84 - 246 U/L   D-dimer, Non VTE   Result Value Ref Range    D-Dimer Non VTE, Quant (ng/mL FEU) 4,882 (H) <=500 ng/mL FEU   POCT GLUCOSE   Result Value Ref Range    POCT Glucose 73 (L) 74 - 99 mg/dL   POCT GLUCOSE   Result Value Ref Range    POCT Glucose 65 (L) 74 - 99 mg/dL   POCT GLUCOSE   Result Value Ref Range    POCT Glucose 138 (H) 74 - 99 mg/dL          Assessment/Plan   Kesha Christensen is a 52 yo F with a PMHx of ESRD on HD, COPD, HFmrEF, pulmonary hemosiderosis, HTN, cirrhosis, who presented from [facility] for bleeding RUE  AVG now POD 4 uncomplicated explant of RUE AV graft. Hematology is consulted for evaluation of thrombocytopenia. Patient has longstanding anemia in the outpatient setting (Hgs at high 7s at baseline), likely 2/2 anemia of chronic disease and ESRD. She has received multiple blood transfusions in the past. She was noted to be CAROLE IgG positive and CAROLE polyspecific positive, with further evaluation with Blood Bank and [place] demonstrating positive anti-HTLA antibodies. In discussion with Blood Bank, anti-HTLA may be reacting with medium and are not necessarily clinically significant, however cannot exclude the presence of autoantibodies. It remains possible that her anemia is secondary to drug-induced hemolysis, however there is a possibility of an autoimmune hemolytic anemia. Will continue to follow laboratory studies, and recommend initiation of steroids.    Examination of her smear 10/3 showed few spherocytes, with <1 fragmented RBC per high power field and few large platelets. Her anemia is likely multifactorial, with etiologies including chronic disease, ESRD, however additional workup with hemolysis lab and consideration of a primary marrow process are necessary.      Her thrombocytopenia is relatively acute since her hospitalizations beginning in September, with frequent platelets as low as 49. Her platelet count continues to improve today to 117 from 102 yesterday, although in the setting of recent receipt of steroids. Infectious and nutritional etiologies of her thrombocytopenia have been evaluated and excluded including viral testing and normal folate and B12 levels. Potential causes of her thrombocytopenia include drug-related (antibiotics including zosyn and daptomycin may be a source), and her known liver cirrhosis.      Plan:  #Anemia  -anti-HTLA positive; these antibodies react with testing media and can be clinically insignificant, however unable to rule out autoantibody. 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 Hayley CROW, MS4

## 2023-10-04 NOTE — CARE PLAN
Problem: Pain  Goal: My pain/discomfort is manageable  Outcome: Progressing     Problem: Daily Care  Goal: Daily care needs are met  Outcome: Progressing   The patient's goals for the shift include      The clinical goals for the shift include      Over the shift, the patient did not make progress toward the following goals. Barriers to progression include ***. Recommendations to address these barriers include ***.

## 2023-10-04 NOTE — NURSING NOTE
Report to Receiving RN:    Report TO: OSORIO LEE  Time Report Called: [id number]   Hand-Off Communication: TOLERATED TREATMENT, VSS  Complications During Treatment: No  Ultrafiltration Treatment: No  Medications Administered During Dialysis: No  Blood Products Administered During Dialysis: No  Labs Sent During Dialysis: No  Heparin Drip Rate Changes: N/A        Last Updated: 6:50 PM by TC FERNANDO

## 2023-10-04 NOTE — PROCEDURES
INTERVENTIONAL RADIOLOGY PRE-PROCEDURE NOTE - Tunneled trialysis catheter placement    HPI     Indication for procedure: The encounter diagnosis was Hemorrhage due to vascular prosthetic devices, implants and grafts, initial encounter (CMS/HCC).    Relevant review of systems: NA    Past Medical History:   Diagnosis Date    Personal history of other diseases of the circulatory system 07/24/2014    History of essential hypertension    Personal history of other diseases of urinary system     Personal history of renal failure    Personal history of pneumonia (recurrent) 04/18/2019    History of pneumonia      Past Surgical History:   Procedure Laterality Date    OTHER SURGICAL HISTORY  04/18/2019    Arteriovenous Surgery Creation Of A-V Fistula    US GUIDED ABDOMINAL PARACENTESIS  4/21/2021    US GUIDED ABDOMINAL PARACENTESIS 4/21/2021       Relevant Labs:   Lab Results   Component Value Date    CREATININE 2.78 (H) 10/04/2023    EGFR 20 (L) 10/04/2023    INR 1.5 (H) 10/03/2023    PROTIME 17.0 (H) 10/03/2023       Planned Sedation/Anesthesia: Moderate    Directed physical examination:    GEN: NAD, A&Ox3  CARD: RRR, normal S1S2, no MRG  PULM: CTAB  ABD: NTND  MSK: Full ROM  NEURO: Grossly intact        Current Facility-Administered Medications:     DAPTOmycin (Cubicin) 350 mg in sodium chloride 0.9% 50 mL IV, 350 mg, intravenous, q48h, Georgette Ibrahim DO, Stopped at 10/03/23 2330    dextrose 50 % injection 12.5 g, 12.5 g, intravenous, q15 min PRN, Kamaljit Wu MD    dextrose 50 % injection 25 g, 25 g, intravenous, q15 min PRN, Kamaljit Wu MD    diphenhydrAMINE (BENADryl) injection 25 mg, 25 mg, intravenous, q8h PRN **OR** diphenhydrAMINE (BENADryl) capsule 25 mg, 25 mg, oral, q8h PRN, Titi Schmitz MD    epoetin deisi (Epogen,Procrit) injection 20,000 Units, 20,000 Units, intravenous, Once per day on Mon Wed Fri, Yana Mcmullen MD MPH    ergocalciferol (Vitamin D-2) drops 8,000 Units, 8,000 Units, oral,  Daily, Kamaljit Wu MD, 800,000 Units at 10/03/23 0930    glucagon (Glucagen) injection 1 mg, 1 mg, intramuscular, q15 min PRN, Kamaljit Wu MD    HYDROmorphone (Dilaudid) injection 0.4 mg, 0.4 mg, intravenous, q4h PRN, Kamaljit Wu MD, 0.4 mg at 10/03/23 2357    HYDROmorphone (Dilaudid) liquid 1 mg, 1 mg, oral, TID, Georgette Ibrahim DO, 1 mg at 10/03/23 2150    insulin lispro (HumaLOG) injection 0-5 Units, 0-5 Units, subcutaneous, q6h, Georgette Ibrahim DO    lidocaine 4 % patch 1 patch, 1 patch, transdermal, q24h, Kamaljit Wu MD, 1 patch at 10/03/23 1359    melatonin tablet 5 mg, 5 mg, oral, Nightly, Georgette Ibrahim DO, 5 mg at 10/03/23 2214    OLANZapine (ZyPREXA) injection 5 mg, 5 mg, intramuscular, q6h PRN, Georgette Ibrahim DO, 5 mg at 10/04/23 0330    OLANZapine (ZyPREXA) tablet 5 mg, 5 mg, nasogastric tube, q6h PRN, Titi Schmitz MD    pantoprazole (ProtoNix) injection 40 mg, 40 mg, intravenous, BID, Kamaljit Wu MD, 40 mg at 10/03/23 2213    piperacillin-tazobactam-dextrose (Zosyn) IV 2.25 g, 2.25 g, intravenous, q8h, Georgette Ibrahim DO, Stopped at 10/04/23 0631     Mallampati: III (soft and hard palate and base of uvula visible)    ASA Score: ASA 2 - Patient with mild systemic disease with no functional limitations    Benefits, risks and alternatives of procedure and planned sedation have been discussed with the patient and/or their representative. All questions answered and they agree to proceed.     Daniela Barnhart MD

## 2023-10-04 NOTE — CARE PLAN
Problem: Pain  Goal: My pain/discomfort is manageable  Outcome: Progressing     Problem: Daily Care  Goal: Daily care needs are met  Outcome: Progressing   The patient's goals for the shift include reduce pain.     The clinical goals for the shift include placement of tunneled HD cath.     Patient met all goals by the end of shift.

## 2023-10-04 NOTE — POST-PROCEDURE NOTE
Interventional Radiology Brief Postprocedure Note    Attending: Ricardo Pop MD     Assistant: REINIER    Diagnosis: Tunneled HD catheter placement    Description of procedure: Technically successful placement of a tunneled hemodialysis catheter. The tunneled dialysis catheter is ready to use.  It is charged with a dwell solution of heparin 1000 units/mL.  Line is READY TO USE.     Anesthesia:  Local    Complications: None    Estimated Blood Loss: none    Medications  As of 10/04/23 1214      pantoprazole (ProtoNix) injection 40 mg (mg) Total dose:  40 mg      Date/Time Rate/Dose/Volume Action       09/30/23 0700 40 mg Given               pantoprazole (ProtoNix) injection 40 mg (mg) Total dose:  280 mg* Dosing weight:  44.6   *Administration not included in total     Date/Time Rate/Dose/Volume Action       09/30/23 0900 *40 mg Missed      2023 40 mg Given     10/01/23  1044 40 mg Given      2149 40 mg Given     10/02/23  1252 40 mg Given      2055 40 mg Given     10/03/23  0930 40 mg Given      2213 40 mg Given     10/04/23  0900 *40 mg Missed               pantoprazole (ProtoNix) 40 mg injection  - Omnicell Override Pull (mg) Total dose:  40 mg      Date/Time Rate/Dose/Volume Action       09/30/23 0700 40 mg Given               HYDROmorphone (Dilaudid) injection 0.4 mg (mg) Total dose:  6.4 mg Dosing weight:  44.6      Date/Time Rate/Dose/Volume Action       09/30/23  0340 0.4 mg Given      1458 0.4 mg Given      2117 0.4 mg Given     10/01/23  0209 0.4 mg Given      0632 0.4 mg Given      1045 0.4 mg Given      1551 0.4 mg Given      2005 0.4 mg Given     10/02/23  0030 0.4 mg Given      0823 0.4 mg Given      1752 0.4 mg Given     10/03/23  0307 0.4 mg Given      0951 0.4 mg Given      1359 0.4 mg Given      1810 0.4 mg Given      2357 0.4 mg Given               collagenase 250 unit/gram ointment Total dose:  Cannot be calculated* Dosing weight:  44.6   *Administration dose not documented     Date/Time  Rate/Dose/Volume Action       09/30/23  0900 *Not included in total Missed               ergocalciferol (Vitamin D-2) drops 8,000 Units (Units) Total dose:  824,000 Units* Dosing weight:  44.6   *Administration not included in total     Date/Time Rate/Dose/Volume Action       09/30/23  1152 8,000 Units Given     10/01/23  1045 8,000 Units Given     10/02/23  1253 8,000 Units Given     10/03/23  0930 800,000 Units Given     10/04/23  0900 *8,000 Units Missed               lidocaine 4 % patch 1 patch (patch) Total dose:  4 patch Dosing weight:  44.6      Date/Time Rate/Dose/Volume Action       09/30/23  1459 1 patch (over 720 min) Medication Applied     10/01/23  0259  (over 720 min) Medication Removed      1500 1 patch (over 720 min) Medication Applied     10/02/23  0300  (over 720 min) Medication Removed      1614 1 patch (over 720 min) Medication Applied     10/03/23  0414  (over 720 min) Medication Removed      1359 1 patch (over 720 min) Medication Applied     10/04/23  0159  (over 720 min) Medication Removed               melatonin tablet 3 mg (mg) Total dose:  9 mg Dosing weight:  44.6      Date/Time Rate/Dose/Volume Action       09/30/23  2023 3 mg Given     10/01/23  2143 3 mg Given     10/02/23  2054 3 mg Given               piperacillin-tazobactam-dextrose (Zosyn) IV 2.25 g (mL/hr) Total dose:  9 g* Dosing weight:  44.6   *From user-documented volume     Date/Time Rate/Dose/Volume Action       09/30/23  0545 2.25 g - 100 mL/hr (over 30 min) - 50 mL New Bag      1453 2.25 g - 100 mL/hr (over 30 min) - 50 mL New Bag      1523  (over 30 min) Stopped      2245 2.25 g - 100 mL/hr (over 30 min) - 50 mL New Bag      2315  (over 30 min) Stopped     10/01/23  0626 2.25 g - 100 mL/hr (over 30 min) New Bag      0656  (over 30 min) Stopped      1500 2.25 g - 100 mL/hr (over 30 min) New Bag      1530 50 mL Stopped      2338 2.25 g - 100 mL/hr (over 30 min) New Bag     10/02/23  0008  (over 30 min) Stopped      0700  *2.25 g - 100 mL/hr (over 30 min) Missed      1616 2.25 g - 100 mL/hr (over 30 min) New Bag      1646  (over 30 min) Stopped     10/03/23  0255 2.25 g - 100 mL/hr (over 30 min) New Bag      0325  (over 30 min) Stopped      0738 2.25 g - 100 mL/hr (over 30 min) New Bag      0808  (over 30 min) Stopped      1434 2.25 g - 100 mL/hr (over 30 min) New Bag      1504  (over 30 min) Stopped      2212 2.25 g - 100 mL/hr (over 30 min) New Bag      2242  (over 30 min) Stopped     10/04/23  0601 2.25 g - 100 mL/hr (over 30 min) New Bag      0631  (over 30 min) Stopped               QUEtiapine (SEROquel) tablet 25 mg (mg) Total dose:  50 mg Dosing weight:  44.6      Date/Time Rate/Dose/Volume Action       09/30/23 2022 25 mg Given     10/01/23  2143 25 mg Given               dextrose 10 % infusion (g/kg/hr) Total dose:  Not documented* Dosing weight:  44.6   *Total volume has not been documented. View each administration to see the amount administered.     Date/Time Rate/Dose/Volume Action       10/02/23  1236 0.3 g/kg/hr - 133.8 mL/hr New Bag               potassium chloride (Klor-Con) packet 40 mEq (mEq) Total dose:  0 mEq* Dosing weight:  44.6   *Administration not included in total     Date/Time Rate/Dose/Volume Action       09/30/23  0900 *40 mEq Missed               oxymetazoline (Afrin) 0.05 % nasal spray 2 spray (spray) Total dose:  2 spray Dosing weight:  44.6      Date/Time Rate/Dose/Volume Action       09/30/23  1457 2 spray Given               insulin lispro (HumaLOG) injection 0-5 Units (Units) Total dose:  Cannot be calculated* Dosing weight:  44.6   *Administration dose not documented     Date/Time Rate/Dose/Volume Action       09/30/23  1130 *Not included in total Missed      1730 *Not included in total Missed      2330 *Not included in total Missed     10/01/23  0530 *Not included in total Missed      1130 *Not included in total Missed      1730 *Not included in total Missed      2330 *Not included in total  Missed     10/02/23  0530 *Not included in total Missed      1130 *Not included in total Missed      1730 *Not included in total Missed      2330 *Not included in total Missed     10/03/23  0530 *Not included in total Missed      1130 *Not included in total Missed      1730 *Not included in total Missed      2330 *Not included in total Missed     10/04/23  0530 *Not included in total Missed      1130 *Not included in total Missed               DAPTOmycin (Cubicin) 350 mg in sodium chloride 0.9% 50 mL IV (mL/hr) Total volume:  Not documented* Dosing weight:  44.6   *Total volume has not been documented. View each administration to see the amount administered.     Date/Time Rate/Dose/Volume Action       10/01/23  2152 350 mg - 114 mL/hr (over 30 min) New Bag      2222  (over 30 min) Stopped     10/03/23  2300 350 mg - 114 mL/hr (over 30 min) New Bag      2330  (over 30 min) Stopped               magnesium sulfate IV 2 g (mL/hr) Total volume:  Not documented* Dosing weight:  44.6   *Total volume has not been documented. View each administration to see the amount administered.     Date/Time Rate/Dose/Volume Action       10/02/23  0129 2 g - 25 mL/hr (over 120 min) New Bag      0329  (over 120 min) Stopped               calcium gluconate in NS IV 2 g (mL/hr) Total volume:  Not documented* Dosing weight:  44.6   *Total volume has not been documented. View each administration to see the amount administered.     Date/Time Rate/Dose/Volume Action       10/02/23  0251 2 g - 100 mL/hr (over 60 min) New Bag      0351  (over 60 min) Stopped               diphenhydrAMINE (BENADryl) injection 25 mg (mg) Total dose:  25 mg Dosing weight:  44.6      Date/Time Rate/Dose/Volume Action       10/03/23  0324 25 mg Given               surgical lubricant gel  - Omnicell Override Pull Total dose:  Cannot be calculated*   *Administration does not have dose documented     Date/Time Rate/Dose/Volume Action       10/03/23  1430  Given                melatonin tablet 5 mg (mg) Total dose:  5 mg Dosing weight:  44.6      Date/Time Rate/Dose/Volume Action       10/03/23  2214 5 mg Given               HYDROmorphone (Dilaudid) liquid 1 mg (mg) Total dose:  1 mg* Dosing weight:  44.6   *Administration not included in total     Date/Time Rate/Dose/Volume Action       10/03/23  2150 1 mg Given     10/04/23  0900 *1 mg Missed               sod phos di, mono-K phos mono (K Phos Neutral) tablet 250 mg (mg) Total dose:  250 mg* Dosing weight:  44.6   *Administration not included in total     Date/Time Rate/Dose/Volume Action       10/03/23  1930 *250 mg Missed      2151 250 mg Given               OLANZapine (ZyPREXA) injection 5 mg (mg) Total dose:  5 mg* Dosing weight:  44.6   *Administration not included in total     Date/Time Rate/Dose/Volume Action       10/04/23  0313 *5 mg Missed      0330 5 mg Given               fentaNYL (Sublimaze) injection (mcg) Total dose:  50 mcg      Date/Time Rate/Dose/Volume Action       10/04/23  0936 50 mcg Given               midazolam (Versed) injection (mg) Total dose:  1 mg      Date/Time Rate/Dose/Volume Action       10/04/23  0936 1 mg Given                   No specimens collected      See detailed result report with images in PACS.    The patient tolerated the procedure well without incident or complication and is in stable condition.

## 2023-10-04 NOTE — CARE PLAN
Problem: Skin  Goal: Decreased wound size/increased tissue granulation at next dressing change  Outcome: Progressing     Problem: Skin  Goal: Participates in plan/prevention/treatment measures  Outcome: Progressing     Problem: Skin  Goal: Prevent/manage excess moisture  Outcome: Progressing     Problem: Skin  Goal: Prevent/minimize sheer/friction injuries  Outcome: Progressing     Problem: Skin  Goal: Promote/optimize nutrition  Outcome: Progressing     Problem: Skin  Goal: Promote skin healing  Outcome: Progressing

## 2023-10-04 NOTE — PROGRESS NOTES
Music Therapy Note    Kesha Lowe was referred by     Therapy Session  Referral Type: New referral this admission  Visit Type: Follow-up visit  Session Start Time: 1443  Session End Time: 1443  Intervention Delivery: In-person  Conflict of Service: Off unit  Number of family members present: 1  Family Present for Session: Parent/Guardian  Family Participation: Supportive            Education Documentation  No documentation found.

## 2023-10-04 NOTE — PROGRESS NOTES
Vascular Surgery Progress Note    Kesha Lowe is a pleasant 53 y.o. female who underwent right upper extremity AVG excision on 9/29/2023.     Subjective:  NAOE. Patient seen while getting dialysis. Patient vitally stable, but disorientated. Patient right arm wrapped from wrist to shoulder. Radial pulses palpable.     Objective:  Vitals:  BP: 143/54  Pulse: 95  Resp: 18  Temp: 36.2  Spo2: 95    Exam:  Constitutional: Cachectic AA female. Lying in bed.   Head: NC/AT, dobhoff in place  Eyes: Sclera clear. Eyes moving out of sync with R. eye persistently abducted  Respiratory/Thorax: Breathing shallowly on NC. Tunneled cath in right chest.   Cardiovascular: RRR on monitor  Gastrointestinal: Soft, nontender, nondistended.  Extremities: RUE with betadine over incision, telfa overlying, and Kerlex and ACE wrap around RUE at RUE AVG site Palpable RUE and wrist radial pulse, warm hand.  I&Os:    Intake/Output Summary (Last 24 hours) at 10/4/2023 1353  Last data filed at 10/4/2023 1322  Gross per 24 hour   Intake 200 ml   Output --   Net 200 ml        Imaging/Labs:  CBC - WBC/Hgb/Hct/Plts: --/5.1*/16.3*/-- (10/04 0811)  CHEM - BUN/Cr/glu/ALT/AST/amyl/lip:35*/2.78*/--/--/--/--/-- (10/04 0811)     LYTES - Na/K/Cl/CO2: 148*/4.1/105/21 (10/04 0811)    No results found for this or any previous visit from the past 2 days.      Assessment & Plan:    Assessment/Plan  Kesha Lowe is a pleasant 53 y.o. female who underwent right upper extremity AVG excision on 9/29/2023. She is recovering appropriately. The right arm incisions look good.     10/4: Tunneled catheter placed by IR     Plan:  Continue antibiotics for 2 weeks, at minimum. From initial start date.   Nurse to change dressing daily starting 10/3 with betadine over RUE incisions, dry gauze/telfa, kerlex and ace wrap.   No other vascular intervention needed at this time. Tunneled HD catheter placed by IR today.    Rest of care per primary team       Patient was seen with fellow, Dr. Kaba and discussed with attending, Dr. Hartmann.     Mago Medellin MD, PGY1   Vascular Surgery  Sb/Bonnie, [id number]

## 2023-10-05 NOTE — PROGRESS NOTES
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     Cardiology consulted in light of echocardiogram findings of severe TR, restricted TV leaflet mobility and severely elevated RVSP. .    Hematology working up for ?hemolytic anemia     Subjective   SR 60-90s  BP improved  WBC up to 26  Hgb 5.1  Still awaiting PRBC from [place]   Tunneled line placed   1L fluid removal with HD yesterday  CXR without significant change from prior    Objective     Physical Exam  Frail appearing and thin, 4L nc, NAD  Right DHT with TF at 45ml/hr  Right neck trialysis cath and right chest tunneled line  Reduced A/E, rales bibasilar  RRR, systolic murmur   JVD ?midneck at 45 degrees   Abd soft, non distended; [facility]  Warm and dry  No BLE edema  RUE with ACE wrap      Last Recorded Vitals  Blood pressure (!) 110/46, pulse 99, temperature 36.7 °C (98.1 °F), resp. rate 18, height 1.524 m (5'), weight (!) 44.6 kg (98 lb 5.2 oz), SpO2 96 %.  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     Cardiology consulted in light of echocardiogram findings of severe TR, restricted TV leaflet mobility and severely elevated RVSP.    Per echo report: Compared with echo study from 9/6/2023, the technique and dedicated RV images are different. In today's study the degree of tricuspid regurgitation is severe compared with moderate and the RVSP is now severely increased at 143 compared to moderate to severe on the prior study. The LVEF is now hyperdynamic with EF 65-70% compared to mildly reduced (45%).  Last HD session prior to the most recent echocardiogram was on 9/22 and noted to have only half sessions due to hemodynamic instability in the setting of sepsis.      # Severe TR  # Severe pHTN  - Likely secondary in the setting of severe pHTN (COPD on home oxygen, pulmonary hemosiderosis and prior pulmonary insults, HFpEF and volume overload in the setting of ESRD). Patient is hypervolumic in the setting of missed HD sessions and incomplete HD sessions lately   - Echo showing TV annular dilatation consistent with secondary/functional TR in the setting of RV pressure and volume overload. TV leaflet thickening and restricted mobility also noted which is also contributing to her severe TR    # Severe concentric LVH and RV hypertrophy  On further review of the echocardiogram images, there is severe LV wall thickening consistent with severe concentric LVH as well as increased RV wall thickening and valvular thickening. Will need to rule out infiltrative cardiac disease such as amyloid with cMRI though those changes could be seen with severe systemic hypertension and pHTN.       Recommendations:  - Continue optimization of her volume status with HD   - No current indication for surgical intervention on her TV given this is likely secondary/function   - Will need to repeat echocardiogram once patient euvolemic to re-assess degree of TR  - Please obtain BNP with next lab draw       Cardiology will follow  Case discussed with CHAPINCITO Jack-CNP  Cardiology Consults    Please call with any questions  Pager [phone] M-F 8a-5p; Saturday  8a-2p  Pager [phone] all other times

## 2023-10-05 NOTE — PROGRESS NOTES
Subjective   Patient ID: Kesha Lowe is a 53 y.o. female.     Evaluation of patient on dialysis at [address] [id number] on 9/22/2023     Chief Complaint   Patient presents with    Other     CONSULT.      HPI  Seen and examined during hemodialysis. Labs and events reviewed.        Subjective:   No c/o related to HD ; awaiting PRB transfusion and rehab     [unfilled]   Scheduled medications  daptomycin, 350 mg, intravenous, q48h  epoetin deisi or biosimilar, 20,000 Units, intravenous, Once per day on Mon Wed Fri  ergocalciferol, 8,000 Units, oral, Daily  HYDROmorphone, 1 mg, oral, TID  insulin lispro, 0-5 Units, subcutaneous, q6h  lidocaine, 1 patch, transdermal, q24h  melatonin, 5 mg, oral, Nightly  pantoprazole, 40 mg, intravenous, BID  piperacillin-tazobactam, 2.25 g, intravenous, q8h      Continuous medications     PRN medications  PRN medications: dextrose, dextrose, diphenhydrAMINE **OR** diphenhydrAMINE, glucagon, HYDROmorphone, OLANZapine, OLANZapine     Heart Rate:  []   Temp:  [36.2 °C (97.2 °F)-37 °C (98.6 °F)]   Resp:  [18-30]   BP: (110-148)/(46-89)   SpO2:  [16 %-96 %]    Weight: (!) 44.6 kg (98 lb 5.2 oz)   Gen: NAD  HEENT: NC/AT  Neck: supple, no JVD   Pulm: clear ant b/l   CVS: RRR, no rub  Abd: S/NT/ND  LE: no edema , no cyanosis   Neuro: no asterixis   Dialysis acces: rt temporary internal jugular catheter         Results from last 7 days   Lab Units 10/04/23  0811   SODIUM mmol/L 148*   POTASSIUM mmol/L 4.1   PHOSPHORUS mg/dL 2.2*   CALCIUM mg/dL 9.9   CO2 mmol/L 21   HEMOGLOBIN g/dL 5.1*        [unfilled]     Results from last 72 hours   Lab Units 10/04/23  0811   CO2 mmol/L 21   BUN mg/dL 35*   ALBUMIN g/dL 2.8*   GLUCOSE mg/dL 51*   WBC AUTO x10*3/uL 26.6*        A/P  ESRD-HD admitted with     Plan HD per submitted orders, UF  1.5L -2L as tolerated  MBD - Phosphorus binder: not needed   Anemia of CKD: EPO to be given on the floor x 3 per week  ; transfusion per primary   Access: no issues   BP: acceptable during treatment   Renal Diet   Daily renal MVI   Continue 3 x per week hemodialysis; SW to ensure availability of o/p HD chair at discharge

## 2023-10-05 NOTE — PROGRESS NOTES
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 Patient underwent RUE AVG excision 09/29/23 complicated by blood loss s/p 1U pRBCs. Palliative Care was consulted to assist with medical decision making.     Subjective:  Underwent trialysis line placement yesterday and dialysis.    Today patient was more communicative, alert on examination. Endorses 7/10 back pain, generalized pain but mainly located at the sacral wound. Reports that changing the pain regimen to oral scheduled dilaudid helped. Reports with the dilaudid the back pain potentially goes down to a 5/10. Still reports a constant, aching pain. Continues to endorse fatigue, problems falling and staying asleep. Denies shortness of breath.    Does report abdominal pain that is new. Pain is located \"all over\" and she cannot pinpoint where. Started this morning and is sharp in character. Has not had a BM this AM and she reports that this does feel like she needs to have a BM. Still passing gas. Last BM yesterday, unsure state/melena/hematochezia.    Spoke with the patient and the  about transitioning to hospice. Patient and  are agreeable.       Symptoms  Donora Symptom Assessment Scores  Pain Score: 7    Patient ID: Kesha Lowe is a 53 y.o. female who presents for Other (CONSULT.).    Palliative Care Social History  Social History: Living situation home with   Hindu/Cultural/Spiritual: identified spiritual needs and/or concerns: patient is spiritual, Ep[employer]opalian (non-Advent), patient minimally responsive but mother would be open to speaking to a     Prior Hospitalizations: multiple  History obtained from: chart review    ROS was difficult to obtain due to patient " being minimally responsive.  Review of Systems   Constitutional:  Positive for fatigue.   Respiratory:  Negative for shortness of breath.    Cardiovascular:  Negative for chest pain.   Gastrointestinal:  Positive for abdominal pain. Negative for constipation, diarrhea, nausea and vomiting.   Musculoskeletal:  Positive for back pain and myalgias.   Skin:  Positive for wound.   Neurological:  Positive for weakness. Negative for headaches.   Psychiatric/Behavioral:  Positive for confusion and sleep disturbance. The patient is nervous/anxious.        Objective   Physical Exam  Constitutional:       Appearance: She is underweight. She is ill-appearing.   HENT:      Head: Normocephalic and atraumatic.      Mouth/Throat:      Mouth: Mucous membranes are dry.   Eyes:      General: No scleral icterus.     Comments: proptosis   Cardiovascular:      Rate and Rhythm: Tachycardia present.      Heart sounds: Murmur heard.      Comments: Systolic ejection murmur present  Pulmonary:      Breath sounds: Rhonchi present.   Abdominal:      General: Abdomen is flat. Bowel sounds are normal.      Palpations: Abdomen is soft.      Tenderness: There is no abdominal tenderness.   Musculoskeletal:      Comments: RUE wrapped s/p AVG excision   Skin:     General: Skin is warm and dry.      Findings: Wound present.      Comments: Sacral pressure ulcers   Neurological:      Mental Status: She is lethargic.      Comments: Aox2, arousable         Assessment/Plan   Palliative Assessment      Prognosis: poor  Decisional Capacity: encephalopathic, waxes and wanes  Patient's understanding of illness: encephalopathic, waxes and wanes  Patient goals of care: comfort, home with home hospice    Advance Care Planning  Advance Directives on file?: <no information>  Health Care Directive on file?: asked for documentation to be brought in  Health Care Agent:   1. Tae  2. Mother- Carlota  Advance directives- completed [facility]. Asked for documentation to  be brought in.   POLST forms: There is no POLST (Physician orders for life-sustaining treatment) form on file for this patient      Allergies  Vancomycin, Benazepril, Carvedilol, and Doxazosin    Last Recorded Vitals  Blood pressure (!) 110/46, pulse 99, temperature 36.7 °C (98.1 °F), resp. rate 18, height 1.524 m (5'), weight (!) 44.6 kg (98 lb 5.2 oz), SpO2 96 %.    Relevant Results  Lab Results   Component Value Date    WBC 26.6 (H) 10/04/2023    HGB 5.1 (LL) 10/04/2023    HCT 16.3 (L) 10/04/2023     (H) 10/04/2023     (L) 10/04/2023      Lab Results   Component Value Date    GLUCOSE 51 (LL) 10/04/2023    CALCIUM 9.9 10/04/2023     (H) 10/04/2023    K 4.1 10/04/2023    CO2 21 10/04/2023     10/04/2023    BUN 35 (H) 10/04/2023    CREATININE 2.78 (H) 10/04/2023      Lab Results   Component Value Date    ALT 6 (L) 09/27/2023    AST 5 (L) 09/27/2023    ALKPHOS 157 (H) 09/27/2023    BILITOT 2.1 (H) 09/27/2023        Assessment and Plan  Kesha Lowe is a 53 year old Female HD#11 with PMHx of ESRD (on HD MWF via RUE AVF), COPD (on home O2 6L, 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 Patient underwent RUE AVG excision 09/29/23 complicated by blood loss s/p 1U pRBCs. Palliative Care was consulted to assist with medical decision making.     Updates as of 10/05:  Discussed with the patient and patient's  about transitioning the patient to hospice. 's main goal is to get the patient home and for her to be comfortable.  Currently waiting on 2U pRBCs from [facility] due to Hgb 5.1. Per hematology, there is concern for autoimmune hemolytic anemia due to low haptoglobin, increasing LDH, +HTLA antibodies so started 1mg/kg IV methylprednisolone.    Problem List in addition to above:   Protein energy malnutrition  Encephalopathy    Recommendations:  -hospice consult  -social work is on board  to help arrange transitioning the patient to home with home hospice  -recommend switching the scheduled melatonin to 5mg at 6PM and 5mg at bedtime scheduled  -continue scheduled pain medication: dilaudid 1mg liquid TID  -continue IV dilaudid 0.4mg Q4H for breakthrough pain      Code Status: DNR/DNI  Patient is a capable decision maker: No  Surrogate decision maker: Tae-   Estimated life expectancy: weeks to months  Symptom Management:  Pain: endorses 7/10 back pain and generalized pain   Nausea: denies  Depression/Anxiety: denies  Appetite: patient was not responsive to this question  Dyspnea: denies  Constipation/Diarrhea: denies    Goals of Care  Most important health related goal: making the patient as comfortable as possible, previous goal was to get home with  Timmy    Functional Status  Activities of Daily Living:  Basic ADLs: (I= independent, A= assistance, D= dependent)  Bathing: D, Dressing: D, Toileting: D, Transferring: D, Continence: D, Feeding: D        Surrogate Decision Making/Advanced Directives  Surrogate Health Care Decision Maker:  Tae  [facility]   Health Care Agent-   1. Tae  2. Mother- Carlota  Advance directives- completed [facility]. Asked for documentation to be brought in.       Caregiving/Caregiver Support  Does the patient require assistance in some or all components of his care, including coordination of medical care? Yes  If Yes, which person serves that role? , mother   Caregiver emotional or practical needs:  was not discussed at this time    Discharge and Social Considerations  Home with Home Hospice    Plan of care was discussed with the attending physician Dr. Locke.  lEizabeth Ferreira MD  PGY-1 Internal Medicine    I saw and evaluated the patient. I personally obtained the key and critical portions of the history and physical exam or was physically present for key and critical portions performed by the resident/fellow. I reviewed the  resident/fellow's documentation and discussed the patient with the resident/fellow. I agree with the resident/fellow's medical decision making as documented in the note.    I had a conversation with patient and , Tae. I shared that given her constellation of issues, I'm concerned about prognosis; and talked about different options of care. They were agreeable with hospice at home. He doesn't want her to be at a facility. We didn't talk further about any other limitations of care.     Chaparro Locke MD

## 2023-10-05 NOTE — PROGRESS NOTES
Kesha Lowe is a 53 y.o. female on day 13 of admission presenting with No Principal Problem: There is no principal problem currently on the Problem List. Please update the Problem List and refresh..      SUBJECTIVE  O/N - TABATHA, VSS. Patient is oriented only to name and can answer very simple questions. Intermittently somnolent.    OBJECTIVE    Last Recorded Vitals  BP (!) 110/46   Pulse 99   Temp 36.7 °C (98.1 °F)   Resp 18   Wt (!) 44.6 kg (98 lb 5.2 oz)   SpO2 96%   Intake/Output last 3 Shifts:    Intake/Output Summary (Last 24 hours) at 10/5/2023 1304  Last data filed at 10/4/2023 1625  Gross per 24 hour   Intake 200 ml   Output 1000 ml   Net -800 ml       Physical Exam  Constitutional: Frail AA female, sleeping prior to exam  Head: NC/AT, dobhoff in place running TFs at 45cc/hr  Eyes: Sclera clear. Eyes moving out of sync with R. eye persistently abducted  Respiratory/Thorax: Breathing shallowly on 4L NC, satting in 90s  Cardiovascular: RRR on monitor  Gastrointestinal: Soft, nontender, nondistended.  Extremities: RUE with betadine over incision, telfa overlying, and Kerlex and ACE wrap around RUE at RUE AVG site Palpable 2+ RUE radial pulse, warm hand, improving edema towards fingers/palm, up to wrist. Weak R. motor on RUE.    Assessment/Plan  Kesha Lowe is a pleasant 53 y.o. female who underwent right upper extremity AVG excision on 9/29/2023. Her right arm incisions are healing well.     10/4: Tunneled catheter placed by IR     Plan:  Continue antibiotics for 2 weeks, at minimum. From initial start date.   Nurse to change dressing daily starting 10/3 with betadine over RUE incisions, dry gauze/telfa, kerlex and ace wrap.   No other vascular intervention needed at this time. Tunneled HD catheter placed by IR on 10/4, tolerated HD sessions  Given WBC yesterday of 26.6 in the setting of long-term abx and liquid stools, consider sending C. Diff and charting I&Os for stools  Rest  of care per primary team      Patient was discussed with attending, Dr. Hartmann.    Sandi Hong MD, MSc  Vascular Surgery  Sb/Bonnie, [id number]

## 2023-10-05 NOTE — PROGRESS NOTES
Received update from Dr. Locke with palliative care that after GOC with patient and spouse; decision was made to move forward with hospice care.     Met with patient and spouse at bedside; they confirmed that [facility] is their agency of choice. Spouse is hopeful that patient is able to return back home with services. He is aware that referral will be placed today and that someone with [facility] will call him to set up a meeting time.    Referral sent to [facility] via [facility]. Dr. Carter and ODETTE felton.    -Thu LICEA, MA, LSW  Care Transitions

## 2023-10-05 NOTE — PROGRESS NOTES
Kesha Lowe is a 53 y.o. female on day 13 of admission presenting with No Principal Problem: There is no principal problem currently on the Problem List. Please update the Problem List and refresh..    Subjective   Pt resting in bed. She denies sob, nausea without emesis, denies diarrhea, fever, cough, chills, chest pains, has generalized pain       Objective     Physical Exam  Vitals reviewed.   Constitutional:       Appearance: She is ill-appearing.   Cardiovascular:      Rate and Rhythm: Tachycardia present.      Comments: Sinus rhythm with hr =100  Pulmonary:      Breath sounds: Wheezing present.      Comments: Exp  O2 4L    Genitourinary:     Comments: anuric  Skin:     General: Skin is warm and dry.   Neurological:      Mental Status: She is alert.      Motor: Weakness present.   Psychiatric:         Mood and Affect: Mood normal.         Behavior: Behavior normal.       Last Recorded Vitals  Blood pressure 146/51, pulse 105, temperature 37.2 °C (99 °F), resp. rate 18, height 1.524 m (5'), weight (!) 44.6 kg (98 lb 5.2 oz), SpO2 100 %.  Intake/Output last 3 Shifts:  I/O last 3 completed shifts:  In: 400 (9 mL/kg) [I.V.:400 (9 mL/kg)]  Out: 1000 (22.4 mL/kg) [Other:1000]  Weight: 44.6 kg     Scheduled medications  daptomycin, 350 mg, intravenous, q48h  epoetin deisi or biosimilar, 20,000 Units, intravenous, Once per day on Mon Wed Fri  ergocalciferol, 8,000 Units, oral, Daily  HYDROmorphone, 1 mg, oral, TID  insulin lispro, 0-5 Units, subcutaneous, q6h  lidocaine, 1 patch, transdermal, q24h  melatonin, 5 mg, oral, Nightly  pantoprazole, 40 mg, intravenous, BID  piperacillin-tazobactam, 2.25 g, intravenous, q8h      Continuous medications     PRN medications  PRN medications: dextrose, dextrose, diphenhydrAMINE **OR** diphenhydrAMINE, glucagon, HYDROmorphone, OLANZapine, OLANZapine            Results for orders placed or performed during the hospital encounter of 09/22/23 (from the past 24 hour(s))    POCT GLUCOSE   Result Value Ref Range    POCT Glucose 138 (H) 74 - 99 mg/dL   POCT GLUCOSE   Result Value Ref Range    POCT Glucose 56 (L) 74 - 99 mg/dL   POCT GLUCOSE   Result Value Ref Range    POCT Glucose 118 (H) 74 - 99 mg/dL   POCT GLUCOSE   Result Value Ref Range    POCT Glucose 77 74 - 99 mg/dL   POCT GLUCOSE   Result Value Ref Range    POCT Glucose 125 (H) 74 - 99 mg/dL                Assessment/Plan   Active Problems:    ESRD (end stage renal disease) on dialysis (CMS/[facility])    -ESRD-HD admitted with AVG malfunction s/p excision      Tolerated hemodialysis with net fluid loss 1000cc    Is hemodynamically stable, euvolemic on exam and has stable electrolytes      [facility]     Access: RT neck IJ- no issues - able to achieve   Rt arm fistula with kerlix/acewrap     Anemia of ESRD: Epogen 20,000 units on dialysis days..current hgb 5.1..will cont to monitor  Pt is awaiting blood from [facility] to get transfusion    CKD-MBD:not on binders..current phos 2.2     Plan HD tomorrow with UF as tolerated     Renal diet      Please obtain daily standing wt (if possible)     Medication to be adjusted for ESRD      Patient to continue regular HD schedule while inpatient and to follow with the outpatient nephrologist at discharge     Per primary team: Palliative care is involved, discussed with her  and aiming for home with hospice care.           per primary team        CHAPINCITO Pritchett-CNP

## 2023-10-05 NOTE — PROGRESS NOTES
Kesha Lowe is a 53 y.o. female on day 13 of admission presenting with encephalopathy, fistula malfunction, GI bleed, anemia, thrombocytopenia    Subjective   Patient was seen and examined at dialysis suit.   She had a right internal jugular tunnel line placed by IR on 10/4,     Reports no new complaints.     Objective     Physical Exam:    Constitutional: Cachectic. A&O x1,   HEENT: dry oral mucosa. Dobhoff in place   CV: RRR, Grade 3 systolic murmur  PULM: CTAB, on 2L NC  ABDOMEN: Soft, NT/ND. No TTP. + BSx4.  SKIN: Pale, Warm, Dry, No Rashes, sacral pressure ulcer  EXTREMITIES: Non-Tender, Full ROM, No Pedal Edema, RUE with bandage  NEURO: A&O x 1, moving all extremities,     Last Recorded Vitals  Blood pressure 141/51, pulse 66, temperature 36.7 °C (98.1 °F), resp. rate 18, height 1.524 m (5'), weight (!) 44.6 kg (98 lb 5.2 oz), SpO2 (!) 72 %.  Intake/Output last 3 Shifts:  I/O last 3 completed shifts:  In: 400 (9 mL/kg) [I.V.:400 (9 mL/kg)]  Out: 1000 (22.4 mL/kg) [Other:1000]  Weight: 44.6 kg     Scheduled medications  daptomycin, 350 mg, intravenous, q48h  epoetin deisi or biosimilar, 20,000 Units, intravenous, Once per day on Mon Wed Fri  ergocalciferol, 8,000 Units, oral, Daily  HYDROmorphone, 1 mg, oral, TID  insulin lispro, 0-5 Units, subcutaneous, q6h  lidocaine, 1 patch, transdermal, q24h  melatonin, 5 mg, oral, Nightly  pantoprazole, 40 mg, intravenous, BID  piperacillin-tazobactam, 2.25 g, intravenous, q8h      Continuous medications     PRN medications  PRN medications: dextrose, dextrose, diphenhydrAMINE **OR** diphenhydrAMINE, glucagon, HYDROmorphone, OLANZapine, OLANZapine      Relevant Results    Results for orders placed or performed during the hospital encounter of 09/22/23 (from the past 24 hour(s))   POCT GLUCOSE   Result Value Ref Range    POCT Glucose 73 (L) 74 - 99 mg/dL   POCT GLUCOSE   Result Value Ref Range    POCT Glucose 65 (L) 74 - 99 mg/dL   POCT GLUCOSE   Result Value  Ref Range    POCT Glucose 138 (H) 74 - 99 mg/dL   POCT GLUCOSE   Result Value Ref Range    POCT Glucose 56 (L) 74 - 99 mg/dL   POCT GLUCOSE   Result Value Ref Range    POCT Glucose 118 (H) 74 - 99 mg/dL            Assessment/Plan    ESRD (end stage renal disease) on dialysis (CMS/[facility])  VASQUEZ PAK is a 53 year old Female with PMH ESRD on HD MWF via RUE AVF, COPD on home O2 6L, 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 on 9/22/2023 for bleeding RUE AVG. Pt had HD at OSH on 9/22 and completed the session. Towards end of HD session, RUE AVG was bleeding excessively. Graft was ligated on 9/22 and pt transferred to [facility] to have graft removed. On 9/23, pt was scheduled for OR graft explant but case cancelled as pt was hypoglycemic, hypotensive, and altered so transferred to CTICU on 9/23 and transferred back to the floor 10/02/23.        Acute on chronic anemia   Patient received one unit of uncrossed matched PRBC  HB is 5.1,   Still waiting on [facility] for her blood       Acute delirium in the setting of prolonged hospitalization, narcotics  Monitor, frequent orientation,      Right upper AVG bleeding s/p explant and sacral decubitus ulcer debridment  Stable   Vascular is advising to continue antibiotics for 2 weeks     Patient is on dilaudid 0.4 mg IVP q 4 hours for pain and palliative has recommended dilaudid 1 mg oral liquid TID   Palliative care following, appreciate recs.        HFpEF/Tricuspid regurg/R sided heart failure   Appreciate cards recommendation     Acute Thrombocytopenia-multifactorial  Hematology consulted and appreciate recs        HTN/HLD/Hypotension-- sepsis vs hemorrhagic shock   resolved      Chronic hypoxic respiratory failure/COPD on 6 L home O2  continue duonebs every 4 hours as need it for wheezing      Severe Protein Calorie Malnutrition/Grade D Esophagitis  9/25 EGD: Moderately severe LA Grade D esophagitis with circumferential ulceration and  exudate not actively bleeding was found. Coffee grounds in esophagus.  Scattered moderate inflammation and scattered hematin was found in the stomach. No signs of active bleeding observed. No bleeding lesions. The examined duodenum showed erosive peptic duodenitis in setting of melanosis, mainly deep erosions but no vessel or pigmented spots.  On Dobhoff feeds.      ESRD on HD (MWF)   Renal following   S/p HD  on 10/4,      Hypoglycemia  resolved      Prophylaxis    On hold due to anemia       Disposition: [facility] matched work up to be complete for patient to be transfuse. Appreciate hematology recs.  Palliative care is involved, discussed with her  and aiming for home with hospice care.        Russell Carter MD

## 2023-10-05 NOTE — PROGRESS NOTES
Reason for Consult: Thrombocytopenia     Subjective   NAEON. Patient received dose of prednisone last night. Less interactive on examination. Shared decision making with patient and family to pursue hospice care.        Objective     Physical Exam  Constitutional: Ill-appearing, cachetic   HENT: Normocephalic, atraumatic. Dry mucous membranes  Eyes: EOMI. Sclera anicteric, conjunctiva without injection.   CV: RRR, no murmurs or rubs.   Pulm: Clear to anterior auscultation b/l. No ronchi or rales. Good aeration.   Abd: Soft, flat, no masses or organomegaly.   Lymph: No cervical lymphadenopathy  Extremities: No lower extremity edema       Last Recorded Vitals  Blood pressure 143/54, pulse 81, temperature 36.8 °C (98.2 °F), temperature source Temporal, resp. rate 18, height 1.524 m (5'), weight (!) 44.6 kg (98 lb 5.2 oz), SpO2 95 %.    Relevant Results  Results for orders placed or performed during the hospital encounter of 09/22/23 (from the past 24 hour(s))   POCT GLUCOSE   Result Value Ref Range    POCT Glucose 65 (L) 74 - 99 mg/dL   POCT GLUCOSE   Result Value Ref Range    POCT Glucose 138 (H) 74 - 99 mg/dL   POCT GLUCOSE   Result Value Ref Range    POCT Glucose 56 (L) 74 - 99 mg/dL   POCT GLUCOSE   Result Value Ref Range    POCT Glucose 118 (H) 74 - 99 mg/dL   POCT GLUCOSE   Result Value Ref Range    POCT Glucose 77 74 - 99 mg/dL         Assessment/Plan   Kesha Christensen is a 52 yo F with a PMHx of ESRD on HD, COPD, HFmrEF, pulmonary hemosiderosis, HTN, cirrhosis, who presented from [facility] for bleeding RUE AVG now POD 4 uncomplicated explant of RUE AV graft. Hematology is consulted for evaluation of thrombocytopenia. Patient has longstanding anemia in the outpatient setting (Hgs at high 7s at baseline), likely 2/2 anemia of chronic disease and ESRD. She has received multiple blood transfusions in the past. She was noted to be CAROLE IgG positive and CAROLE polyspecific positive, with further evaluation with Blood  Bank and [facility] demonstrating positive anti-HTLA antibodies. In discussion with Blood Bank, anti-HTLA may be reacting with medium and are not necessarily clinically significant, however cannot exclude the presence of autoantibodies. It remains possible that her anemia is secondary to drug-induced hemolysis, however there is a possibility of an autoimmune hemolytic anemia. Will continue to follow laboratory studies, and recommend continuation of steroids.    Examination of her smear 10/3 showed few spherocytes, with <1 fragmented RBC per high power field and few large platelets. Her anemia is likely multifactorial, with etiologies including chronic disease, ESRD, however additional workup with hemolysis lab and consideration of a primary marrow process are necessary.      Her thrombocytopenia is relatively acute since her hospitalizations beginning in September, with frequent platelets as low as 49. Her platelet count continues to improve today to 117 from 102 yesterday, although in the setting of recent receipt of steroids. Infectious and nutritional etiologies of her thrombocytopenia have been evaluated and excluded including viral testing and normal folate and B12 levels. Potential causes of her thrombocytopenia include drug-related (antibiotics including zosyn and daptomycin may be a source), and her known liver cirrhosis.     Palliative care met with patient and spouse 10/5, per primary team decision was made to move forward with hospice care.       Plan:  #Anemia  -anti-HTLA positive; these antibodies react with testing media and can be clinically insignificant, however unable to rule out autoantibody. 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     Patient seen and discussed with Dr. Hardy.    ASHLYN CROW, MS4

## 2023-10-05 NOTE — PROGRESS NOTES
10/5/23 3152 Transitional Care Coordinator Notes:    Patient still anemic, requiring blood transfusion. Awaiting for donor blood to come from the [place] due to patient having antibodies. Will send updates to [facility] as available. Will continue to monitor for discharge updates.     4508 Informed by LAYNE patient has chosen to transition to hospice. Updated SNF and they will close referral                    Assessment/Plan   Active Problems:    ESRD (end stage renal disease) on dialysis (CMS/[facility])    Discharge Plan: discharge to [facility] when medically stable.  Updated discharge plan: patient will transition to hospice, consult placed.           Carmen Mckeon, RN

## 2023-10-06 NOTE — CARE PLAN
The patient's goals for the shift include      The clinical goals for the shift include Pt will remain free of injury throughout the shift    Over the shift, the patient did not make progress toward the following goals. Barriers to progression include incontinence. Recommendations to address these barriers include maintain FMS.     59

## 2023-10-06 NOTE — CARE PLAN
The patient's goals for the shift include      The clinical goals for the shift include Pt will remain free of injury throughout the shift    Over the shift, the patient did not make progress toward the following goals. Barriers to progression include Recommendations to address these barriers include

## 2023-10-06 NOTE — PROGRESS NOTES
Kesha Lowe is a 53 y.o. female on day 14 of admission presenting with encephalopathy, fistula malfunction, GI bleed, anemia, thrombocytopenia    Subjective   Patient was seen and examined at bedside.   She had a right internal jugular tunnel line placed by IR on 10/4,     Patient is confused. Unable to communicate clearly.   Reports no c/o pain.     Objective     Physical Exam:    Constitutional: Cachectic. Oriented to self,    HEENT: dry oral mucosa. Dobhoff in place   CV: RRR, Grade 3 systolic murmur  PULM: CTAB, on 2L NC  ABDOMEN: Soft, NT/ND. No TTP. + BSx4.  SKIN: Pale, Warm, Dry, No Rashes, sacral pressure ulcer  EXTREMITIES: Non-Tender, Full ROM, No Pedal Edema, RUE with bandage  NEURO: oriented to self, moving all extremities,     Last Recorded Vitals  Blood pressure 163/67, pulse 93, temperature 36.7 °C (98.1 °F), resp. rate 16, height 1.524 m (5'), weight (!) 44.6 kg (98 lb 5.2 oz), SpO2 98 %.  Intake/Output last 3 Shifts:  I/O last 3 completed shifts:  In: - (0 mL/kg)   Out: 400 (9 mL/kg) [Stool:400]  Weight: 44.6 kg     Scheduled medications  daptomycin, 350 mg, intravenous, q48h  epoetin deisi or biosimilar, 20,000 Units, intravenous, Once per day on Mon Wed Fri  ergocalciferol, 8,000 Units, oral, Daily  HYDROmorphone, 1 mg, oral, TID  insulin lispro, 0-5 Units, subcutaneous, q6h  lidocaine, 1 patch, transdermal, q24h  melatonin, 5 mg, oral, Nightly  pantoprazole, 40 mg, intravenous, BID  piperacillin-tazobactam, 2.25 g, intravenous, q8h      Continuous medications     PRN medications  PRN medications: dextrose, dextrose, diphenhydrAMINE **OR** diphenhydrAMINE, glucagon, HYDROmorphone, OLANZapine, OLANZapine      Relevant Results    Results for orders placed or performed during the hospital encounter of 09/22/23 (from the past 24 hour(s))   POCT GLUCOSE   Result Value Ref Range    POCT Glucose 104 (H) 74 - 99 mg/dL   POCT GLUCOSE   Result Value Ref Range    POCT Glucose 118 (H) 74 - 99  mg/dL   POCT GLUCOSE   Result Value Ref Range    POCT Glucose 95 74 - 99 mg/dL            Assessment/Plan    ESRD (end stage renal disease) on dialysis (CMS/[facility])  MELLISA VASQUEZ PILLO is a 53 year old Female with PMH ESRD on HD MWF via RUE AVF, COPD on home O2 6L, 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 Pt had HD at OSH on 9/22 and completed the session. Towards end of HD session, RUE AVG was bleeding excessively. Graft was ligated on 9/22 and pt transferred to [facility] to have graft removed. On 9/23, pt was scheduled for OR graft explant but case cancelled as pt was hypoglycemic, hypotensive, and altered so transferred to CTICU on 9/23 and transferred back to the floor 10/02/23.  Patient remains weak with poor intake. Dobhoff feeds started.  Due to multiple co morbidities, palliative care is involved.  Hospice will meet with the family tomorrow.        Acute on chronic anemia   Patient received one unit of uncrossed matched PRBC  HB is 5.1,   Still waiting on [facility] for her blood       Acute delirium in the setting of prolonged hospitalization, narcotics  Monitor,      Right upper AVG bleeding s/p explant and sacral decubitus ulcer debridment  Stable   Vascular advised to continue antibiotics for 2 weeks     Patient is on dilaudid 0.4 mg IVP q 4 hours for pain and palliative has recommended dilaudid 1 mg oral liquid TID   Palliative care recs appreciated,   Hospice team will talk to family tomorrow         HFpEF/Tricuspid regurg/R sided heart failure   Appreciate cards recommendation     Acute Thrombocytopenia-multifactorial  Hematology consulted and appreciate recs        HTN/HLD/Hypotension-- sepsis vs hemorrhagic shock   resolved      Chronic hypoxic respiratory failure/COPD on 6 L home O2  continue duonebs every 4 hours as need it for wheezing      Severe Protein Calorie Malnutrition/Grade D Esophagitis  9/25 EGD: Moderately severe LA  Grade D esophagitis with circumferential ulceration and exudate not actively bleeding was found. Coffee grounds in esophagus.  Scattered moderate inflammation and scattered hematin was found in the stomach. No signs of active bleeding observed. No bleeding lesions. The examined duodenum showed erosive peptic duodenitis in setting of melanosis, mainly deep erosions but no vessel or pigmented spots.  On Dobhoff feeds.      ESRD on HD (MWF)   Renal following   S/p HD  on 10/4,      Hypoglycemia  resolved      Prophylaxis    On hold due to anemia       Disposition: [facility] matched work up to be complete for patient to be transfuse. Appreciate hematology recs.  Palliative care is involved, discussed with her  and aiming for home with hospice care. Hospice team will talk to the family tomorrow.  Further tube feeds and HD as per family decision tomorrow.         Russell Carter MD

## 2023-10-06 NOTE — PROGRESS NOTES
Subjective   Patient ID: Kesha Lowe is a 53 y.o. female.     Evaluation of patient on dialysis at [address] [id number] on 9/22/2023     Chief Complaint   Patient presents with    Other     CONSULT.      HPI  Labs and events reviewed.   Pt examined at bedside     Subjective:   Feels tired ; c/o diarrhea; TF ongoing   Patient specifically states she would like to stop dialysis after discussion with palliative yesterday     [unfilled]   Patient Active Problem List   Diagnosis    ESRD (end stage renal disease) on dialysis (CMS/[facility])       Scheduled medications  daptomycin, 350 mg, intravenous, q48h  epoetin deisi or biosimilar, 20,000 Units, intravenous, Once per day on Mon Wed Fri  ergocalciferol, 8,000 Units, oral, Daily  HYDROmorphone, 1 mg, oral, TID  insulin lispro, 0-5 Units, subcutaneous, q6h  lidocaine, 1 patch, transdermal, q24h  melatonin, 5 mg, oral, Nightly  pantoprazole, 40 mg, intravenous, BID  piperacillin-tazobactam, 2.25 g, intravenous, q8h      Continuous medications     PRN medications  PRN medications: dextrose, dextrose, diphenhydrAMINE **OR** diphenhydrAMINE, glucagon, HYDROmorphone, OLANZapine, OLANZapine     Heart Rate:  []   Temp:  [36.6 °C (97.9 °F)-37.2 °C (99 °F)]   Resp:  [16-18]   BP: (141-147)/(51-60)   SpO2:  [83 %-100 %]    Weight: (!) 44.6 kg (98 lb 5.2 oz)   Gen: alert, NAD  HEENT: NC/AT  Neck: supple, no JVD   Pulm: clear ant b/l   CVS: RRR, no rub  Abd: S/NT/ND; just had a large diarrhea episode during exam   LE: no edema , no cyanosis   Neuro: no asterixis         Results from last 7 days   Lab Units 10/04/23  0811   SODIUM mmol/L 148*   POTASSIUM mmol/L 4.1   PHOSPHORUS mg/dL 2.2*   CALCIUM mg/dL 9.9   CO2 mmol/L 21   HEMOGLOBIN g/dL 5.1*        [unfilled]     Results from last 72 hours   Lab Units 10/04/23  0811   ALBUMIN g/dL 2.8*   GLUCOSE mg/dL 51*   HEMOGLOBIN g/dL 5.1*   WBC AUTO x10*3/uL 26.6*          Results from last 72  hours   Lab Units 10/04/23  0811   SODIUM mmol/L 148*   POTASSIUM mmol/L 4.1   CO2 mmol/L 21   BUN mg/dL 35*   CREATININE mg/dL 2.78*   PHOSPHORUS mg/dL 2.2*   CALCIUM mg/dL 9.9        A/P  ESRD-HD admitted with bleeding AVG requiring ligation and placement of internal jugular cath ; hospital course compliacted by sepsis, hypoglycemia, severe anemia and malnutrition    ESRD on HD MWF via RUE AVF, COPD on home O2 6L, 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

## 2023-10-06 NOTE — PROGRESS NOTES
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 Patient underwent RUE AVG excision 09/29/23 complicated by blood loss s/p 1U pRBCs. Palliative Care was consulted to assist with medical decision making.     Subjective:  Today, patient reports minimal back/sacral pain that has been constant. Reports the pain is a 5-6/10. Reports that changing the pain regimen to oral scheduled dilaudid helped. Continues to endorse fatigue, problems falling and staying asleep. Denies shortness of breath. Reports 1 liquid stool today without blood.    Patient still agreeable to hospice. Main goal is getting home with her  and being comfortable.       Symptoms  [place] Symptom Assessment Scores  Pain Score: 4    Patient ID: Kesha Lowe is a 53 y.o. female who presents for Other (CONSULT.).    Palliative Care Social History  Social History: Living situation home with   Restoration/Cultural/Spiritual: identified spiritual needs and/or concerns: patient is spiritual, Orthodoxy (non-Moravian), patient minimally responsive but mother would be open to speaking to a     Prior Hospitalizations: multiple  History obtained from: chart review    ROS was difficult to obtain due to patient being minimally responsive.  Review of Systems   Constitutional:  Positive for fatigue.   Respiratory:  Negative for shortness of breath.    Cardiovascular:  Negative for chest pain.   Gastrointestinal:  Positive for abdominal pain. Negative for constipation, diarrhea, nausea and vomiting.   Musculoskeletal:  Positive for back pain and myalgias.   Skin:  Positive for wound.   Neurological:  Positive for weakness. Negative for headaches.   Psychiatric/Behavioral:  Positive for  confusion and sleep disturbance. The patient is nervous/anxious. Hyperactive: .sig.       Objective   Physical Exam  Constitutional:       Appearance: She is underweight. She is ill-appearing.   HENT:      Head: Normocephalic and atraumatic.      Mouth/Throat:      Mouth: Mucous membranes are dry.   Eyes:      General: No scleral icterus.     Comments: proptosis   Cardiovascular:      Rate and Rhythm: Tachycardia present.      Heart sounds: Murmur heard.      Comments: Systolic ejection murmur present  Pulmonary:      Breath sounds: Rhonchi present.   Abdominal:      General: Abdomen is flat. Bowel sounds are normal.      Palpations: Abdomen is soft.      Tenderness: There is no abdominal tenderness.   Musculoskeletal:      Comments: RUE wrapped s/p AVG excision   Skin:     General: Skin is warm and dry.      Findings: Wound present.      Comments: Sacral pressure ulcers   Neurological:      Mental Status: She is lethargic.      Comments: Aox2, arousable         Assessment/Plan   Palliative Assessment      Prognosis: poor  Decisional Capacity: encephalopathic, waxes and wanes  Patient's understanding of illness: encephalopathic, waxes and wanes  Patient goals of care: comfort, home with home hospice    Advance Care Planning  Advance Directives on file?: <no information>  Health Care Directive on file?: asked for documentation to be brought in  Health Care Agent:   1. Christbriener  2. Mother- Carlota  Advance directives- completed [facility]. Asked for documentation to be brought in.   POLST forms: There is no POLST (Physician orders for life-sustaining treatment) form on file for this patient      Allergies  Vancomycin, Benazepril, Carvedilol, and Doxazosin    Last Recorded Vitals  Blood pressure 163/67, pulse 93, temperature 36.7 °C (98.1 °F), resp. rate 16, height 1.524 m (5'), weight (!) 44.6 kg (98 lb 5.2 oz), SpO2 98 %.    Relevant Results  Lab Results   Component Value Date    WBC 26.6 (H) 10/04/2023    HGB 5.1 (LL)  10/04/2023    HCT 16.3 (L) 10/04/2023     (H) 10/04/2023     (L) 10/04/2023      Lab Results   Component Value Date    GLUCOSE 51 (LL) 10/04/2023    CALCIUM 9.9 10/04/2023     (H) 10/04/2023    K 4.1 10/04/2023    CO2 21 10/04/2023     10/04/2023    BUN 35 (H) 10/04/2023    CREATININE 2.78 (H) 10/04/2023      Lab Results   Component Value Date    ALT 6 (L) 09/27/2023    AST 5 (L) 09/27/2023    ALKPHOS 157 (H) 09/27/2023    BILITOT 2.1 (H) 09/27/2023        Assessment and Plan  Kesha Lowe is a 53 year old Female HD#11 with PMHx of ESRD (on HD MWF via RUE AVF), COPD (on home O2 6L, 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 Patient underwent RUE AVG excision 09/29/23 complicated by blood loss s/p 1U pRBCs. Palliative Care was consulted to assist with medical decision making.     Problem List in addition to above:   Protein energy malnutrition  Encephalopathy  Autoimmune hemolytic anemia    Recommendations:  -hospice meeting with the patient/family tomorrow at 12:30  -social work is on board to help arrange transitioning the patient to home with home hospice  -recommend switching the scheduled melatonin to 5mg at 6PM and 5mg at bedtime scheduled  -continue scheduled pain medication: dilaudid 1mg liquid TID  -continue IV dilaudid 0.4mg Q4H for breakthrough pain      Code Status: DNR/DNI  Patient is a capable decision maker: No  Surrogate decision maker: Tae-   Estimated life expectancy: weeks to months  Symptom Management:  Pain: endorses 5-6/10 back pain and generalized pain   Nausea: denies  Depression/Anxiety: endorses  Appetite: minimal  Dyspnea: denies  Constipation/Diarrhea: denies    Goals of Care  Most important health related goal: making the patient as comfortable as possible, previous goal was to get home with  Timmy    Functional Status  Activities of Daily  Living:  Basic ADLs: (I= independent, A= assistance, D= dependent)  Bathing: D, Dressing: D, Toileting: D, Transferring: D, Continence: D, Feeding: D        Surrogate Decision Making/Advanced Directives  Surrogate Health Care Decision Maker:  Tae PHILIP   Health Care Agent-   1. Tae  2. Mother- Carlota  Advance directives- completed [facility]. Asked for documentation to be brought in.       Caregiving/Caregiver Support  Does the patient require assistance in some or all components of his care, including coordination of medical care? Yes  If Yes, which person serves that role? , mother   Caregiver emotional or practical needs:  was not discussed at this time    Discharge and Social Considerations  Home with Home Hospice    Plan of care was discussed with the attending physician Dr. Locke.  Elizabeth Ferreira MD  PGY-1 Internal Medicine      Plan of care was discussed with the attending physician Dr. Locke.    Elizabeth Ferreira MD  PGY-1 Internal Medicine    I saw and evaluated the patient. I personally obtained the key and critical portions of the history and physical exam or was physically present for key and critical portions performed by the resident/fellow. I reviewed the resident/fellow's documentation and discussed the patient with the resident/fellow. I agree with the resident/fellow's medical decision making as documented in the note.    Chaparro Locke MD

## 2023-10-06 NOTE — PROGRESS NOTES
Received update from [facility] that meeting is planned for tomorrow at 12:30pm with patient and spouse to discuss hospice services/sign consents. Plan is for patient to discharge home with hospice services.    Update was provided to Dr. Carter and Carmen TILLMAN to follow up tomorrow following meeting.    -Thu LICEA, MA, LSW

## 2023-10-07 NOTE — CARE PLAN
Had a long meeting with patient's , mother and sister.  Talked about goals of care.  They had a meeting with hospice this known and they were unsure on which route to go.  Talked about overall patient's condition and prognosis.  Patient has multiple active medical problem and pursuing further treatment may not change the overall outcome.   and patient are on the same page.  Spoke extensively regarding multiple options and what to do.  Ultimately they want to go home with hospice.  All their questions and concerns are answered.    Hospice nurse informed and she will arrange for bed at home and potential discharge tomorrow with hospice.    48 minutes of additional time spent having discussion with patient's spouse, sister and mother and hospice nurse.

## 2023-10-07 NOTE — CARE PLAN
Problem: Pain  Goal: My pain/discomfort is manageable  Outcome: Not Progressing     Problem: Daily Care  Goal: Daily care needs are met  Outcome: Not Progressing     Problem: Skin  Goal: Decreased wound size/increased tissue granulation at next dressing change  Outcome: Not Progressing  Goal: Promote/optimize nutrition  Outcome: Not Progressing   The patient's goals for the shift include      The clinical goals for the shift include. Patient is alert and lethargic family at bedside most of the day. Patient turned and family educated on the importance on every 2 hour turning and repositionings. Wound care completed patient not tolerating. Plan is for home with hospice care.     Over the shift, the patient did not make progress toward the following goals. Barriers to progression include Pain and wound . Recommendations to address these barriers include Frequent turning and repositioning and pain medication.    Problem: Pain  Goal: My pain/discomfort is manageable  Outcome: Not Progressing     Problem: Daily Care  Goal: Daily care needs are met  Outcome: Not Progressing     Problem: Skin  Goal: Decreased wound size/increased tissue granulation at next dressing change  Outcome: Not Progressing  Goal: Promote/optimize nutrition  Outcome: Not Progressing

## 2023-10-07 NOTE — PROGRESS NOTES
Kesha Lowe is a 53 y.o. female on day 15 of admission presenting with encephalopathy, fistula malformation, GI bleed, anemia and thrombocytopenia.      Subjective   Does not really offer much information.  Communicating slightly but difficult to understand.  Talked about meeting this afternoon.       Objective     Last Recorded Vitals  /56   Pulse 93   Temp 36.6 °C (97.9 °F)   Resp 18   Wt (!) 44.6 kg (98 lb 5.2 oz)   SpO2 100%   Intake/Output last 3 Shifts:  No intake or output data in the 24 hours ending 10/07/23 1057    Admission Weight  Weight: (!) 44.6 kg (98 lb 5.2 oz) (09/28/23 1403)    Daily Weight  09/28/23 : (!) 44.6 kg (98 lb 5.2 oz)      Physical Exam:  General: Alert, in mild distress, cachectic with significant muscle wasting.  On nasal cannula  HEENT: PERRLA, Dobbhoff in place, dry mucous membrane  Neck: Right internal jugular tunnel line placed  Lungs: Clear to auscultation, work of breathing within normal limit  Cardiac: Regular rate and rhythm  Abdomen: Soft nontender, positive bowel sounds  : Exam deferred  Skin: Sacral pressure ulcer  Hematology: No petechia or excessive ecchymosis  Musculoskeletal: Contracted with cachexia.  Bandages noted  Neurological: Alert awake oriented x1  Psych: No suicidal ideation or homicidal ideation    Relevant Results  Scheduled medications  daptomycin, 350 mg, intravenous, q48h  epoetin deisi or biosimilar, 20,000 Units, intravenous, Once per day on Mon Wed Fri  ergocalciferol, 8,000 Units, oral, Daily  HYDROmorphone, 1 mg, oral, TID  insulin lispro, 0-5 Units, subcutaneous, q6h  lidocaine, 1 patch, transdermal, q24h  melatonin, 5 mg, oral, Nightly  pantoprazole, 40 mg, intravenous, BID  piperacillin-tazobactam, 2.25 g, intravenous, q8h      Continuous medications     PRN medications  PRN medications: dextrose, dextrose, diphenhydrAMINE **OR** diphenhydrAMINE, glucagon, HYDROmorphone, OLANZapine, OLANZapine     Results for orders placed  or performed during the hospital encounter of 09/22/23 (from the past 24 hour(s))   POCT GLUCOSE   Result Value Ref Range    POCT Glucose 118 (H) 74 - 99 mg/dL   POCT GLUCOSE   Result Value Ref Range    POCT Glucose 95 74 - 99 mg/dL   POCT GLUCOSE   Result Value Ref Range    POCT Glucose 88 74 - 99 mg/dL   POCT GLUCOSE   Result Value Ref Range    POCT Glucose 95 74 - 99 mg/dL             XR chest 2 views    Result Date: 10/5/2023  Interpreted By:  Roger Aceves, STUDY: XR CHEST 2 VIEWS;  10/4/2023 7:38 pm   INDICATION: Signs/Symptoms:F/u for Pulm edema.   COMPARISON: Radiograph dated 09/26/2023   ACCESSION NUMBER(S): [id number]   ORDERING CLINICIAN: NACHO MELENDEZ   FINDINGS: Enteric tube is in place with the tip outside the field of view. Right IJ central venous catheter is projecting over mid SV C. A right IJ hemodialysis catheter is projecting over the right atrium. A vascular stent is projecting over the right axilla.   Cardiac silhouette size is enlarged, unchanged. Dense calcification of the aortic knob and descending thoracic aorta.   Extensive ground-glass opacity and increased interstitial markings of bilateral lungs. No sizable pneumothorax. Question trace left pleural effusion.   No acute osseous abnormality.       1. No significant interval change in cardiomegaly and pulmonary edema. Please correlate with status. Infectious process appears less likely but should be considered if clinically relevant. 2. Suggestion of trace left pleural effusion       Signed by: Roger Gross 10/5/2023 8:03 AM Dictation workstation:   [id number]    XR abdomen 1 view    Result Date: 9/28/2023  Interpreted By:  EMMA REAL MD and AAMIR CALDERON MD MRN: [id number] Patient Name: VASQUEZ PAK  STUDY: ABDOMEN AP VIEW;  9/28/2023 3:00 pm  INDICATION: Abdoominal pain .  COMPARISON: Single-view abdomen 09/27/2023  ACCESSION NUMBER(S): [id number]  ORDERING CLINICIAN: KYE SONG  FINDINGS: Enteric  tube in place with tip overlying the gastric body. Mild gaseous distention of the stomach and multiple bowel loops. Nonobstructive bowel gas pattern. Limited evaluation of pneumoperitoneum on supine imaging, however no gross evidence of free air is noted. Aortic and iliac graft. Splenic artery calcification.  Visualized lungs are clear.  Osseous structures demonstrate no acute bony changes.      1.  Enteric tube unchanged in position. Nonobstructive bowel-gas pattern.  I personally reviewed the images/study and I agree with the findings as stated. This study was interpreted at [place], [place].  MACRO: None    XR abdomen 1 view    Result Date: 9/27/2023  Interpreted By:  EMMA REAL MD MRN: [id number] Patient Name: VASQUEZ PAK  STUDY: ABDOMEN AP VIEW;  9/27/2023 2:50 pm  INDICATION: Dobhoff placemeny .  COMPARISON: 09/16/2023  ACCESSION NUMBER(S): [id number]  ORDERING CLINICIAN: ALFREDA BUENROSTRO  FINDINGS: Single high centered KUB of the abdomen. Dobbhoff catheter is seen in the left upper abdomen with tip in position consistent with gastric placement. Patient has had previous aortic and iliac graft surgery. There is prominent calcification of the splenic artery. Nonobstructive bowel gas pattern. Limited evaluation of pneumoperitoneum on supine imaging, however no gross evidence of free air is noted.  Visualized lungs are clear. The heart appears to be enlarged.  Osseous structures demonstrate no acute bony changes.      1.  Confirmation of normal enterogastric tube placement. Nonspecific abdomen findings.  MACRO: None    XR chest 1 view    Result Date: 9/26/2023  Interpreted By:  EMMA REAL MD and AAMIR CALDERON MD MRN: [id number] Patient Name: VASQUEZ PAK  STUDY: CHEST 1 VIEW;  9/26/2023 3:18 pm  INDICATION: S/p temporary hemodialysis catheter placement .  COMPARISON: Single-view chest 9/25/2023  ACCESSION NUMBER(S): [id number]  ORDERING CLINICIAN:  TACHO RIOS  FINDINGS: AP radiograph of the chest was provided.  Interval placement of right IJ central axis catheter with tip terminating in the distal SVC.  CARDIOMEDIASTINAL SILHOUETTE: Cardiomediastinal silhouette is enlarged and stable in size and configuration. Calcifications of the aortic knob.  LUNGS: Patchy interstitial and airspace opacities are diffusely worsened compared to prior exam, most notably in the right lower lobe. Pleural effusion. No pneumothorax.  ABDOMEN: No remarkable upper abdominal findings.  BONES: No acute osseous changes.      1.  Interval placement of right IJ central axis catheter with tip terminating in distal SVC. 2. Patchy interstitial airspace opacities are diffusely worsened, most notably in the right lower lobe. Findings are concerning for worsening pulmonary edema or infectious process such as multifocal pneumonia.  I personally reviewed the images/study and I agree with the findings as stated. This study was interpreted at [place], [place].   MACRO: None    EGD    Result Date: 9/25/2023  Patient Name: Kesha Daugherty Procedure Date: 9/25/2023 3:14 PM MRN: [id number] Account Number: [id number] YOB: 1970 Admit Type: Inpatient Site: Travel Ethnicity: Not  or  Race: Black or  Attending MD: John Briscoe MD, [id number] Procedure:             Upper GI endoscopy Indications:           Melena Comorbidities:      COPD/asthma (6L NC), HFpEF (LVEF 69%, severe TR, moderate [place], moderately      to severely decreased RV function, 7/5/23), ESRD (MWF), HTN Patient Profile:       54 y/o F with melena and Hb drop is here for EGD. Providers:             John Briscoe MD (Doctor), Jessenia Scott, RN                        (Nurse) Referring:             Ines Bolton MD, Janny Pina MD Medicines:             Monitored Anesthesia Care Complications:         Transient Hypoxia, resolved with oxygen  and chin lift Procedure:             Pre-Anesthesia Assessment:                        - Prior to the procedure, a History and Physical was                        performed, and patient medications and allergies were                        reviewed. The patient is competent. The risks and                        benefits of the procedure and the sedation options and                        risks were discussed with the patient. All questions                        were answered and informed consent was obtained.                        Patient identification and proposed procedure were                        verified by the physician and the nurse in the                        procedure room. Mental Status Examination: anxious.                        Airway Examination: normal oropharyngeal airway and                        neck mobility. Respiratory Examination: clear to                        auscultation. CV Examination: tachycardia noted.                        Prophylactic Antibiotics: The patient does not require                        prophylactic antibiotics. Prior Anticoagulants: The                        patient has taken no anticoagulant or antiplatelet                        agents. ASA Grade Assessment: III - A patient with                        severe systemic disease. After reviewing the risks and                        benefits, the patient was deemed in satisfactory                        condition to undergo the procedure. The anesthesia                        plan was to use moderate sedation / analgesia                        (conscious sedation). Immediately prior to                        administration of medications, the patient was                        re-assessed for adequacy to receive sedatives. The                        heart rate, respiratory rate, oxygen saturations,                        blood pressure, adequacy of pulmonary ventilation, and                        response to care  were monitored throughout the                        procedure. The physical status of the patient was                        re-assessed after the procedure.                        - Prior Aspirin/ NSAID therapy: The patient has taken                        no aspirin or NSAID medications.                        After obtaining informed consent, the endoscope was                        passed under direct vision. Throughout the procedure,                        the patient's blood pressure, pulse, and oxygen                        saturations were monitored continuously. The endoscope                        was introduced through the mouth, and advanced to the                        second part of duodenum. The upper GI endoscopy was                        accomplished without difficulty. The patient tolerated                        the procedure poorly due to the patient's respiratory                        instability (hypoxia). Findings:      Moderaty severe LA Grade D esophagitis with circumferential ulceration      adn exudate not actively bleeding was found. Coffee groiunds in esophagus      Scattered moderate inflammation and scattered hematin was found in the      stomach. No sings of active bleeding observed. No bleeding lesions      The examined duodenum showed erosive peptic duodenitis in setting of      melanosis, mainly deep erosions but no vessel or pigmented spots. Moderate Sedation:      MAC per ICU team Impression:            - Due to a hypoxia, we had to do a quick examination.                        - Mioderately severe esophagitis with hematin. No                        active bleeding.                        - Chronic gastritis.                        - Duodenitis with erosions and melanosis.                        - No specimens collected. No active bleeding Estimated Blood Loss:      Estimated blood loss: none. Recommendation:        - Return patient to ICU for ongoing care.                         - No signs of active bleeding.                        - Resume previous diet.                        - would use twice a day high dose PPI. Procedure Code(s):     --- Professional ---                        88240, Esophagogastroduodenoscopy, flexible,                        transoral; diagnostic, including collection of                        specimen(s) by brushing or washing, when performed                        (separate procedure) Diagnosis Code(s):     --- Professional ---                        K21.00, Gastro-esophageal reflux disease with                        esophagitis, without bleeding                        K29.50, Unspecified chronic gastritis without bleeding                        K92.1, Melena (includes Hematochezia) CPT copyright 2021 American Medical Association. All rights reserved. The codes documented in this report are preliminary and upon  review may be revised to meet current compliance requirements. Attending Participation:      I was present and participated during the entire procedure, including      non-toro portions. John Briscoe MD 9/25/2023 6:41:29 PM This report has been signed electronically. Number of Addenda: 0 Note Initiated On: 9/25/2023 3:14 PM Total Procedure Duration Time 0 hours 5 minutes 37 seconds     XR chest 1 view    Result Date: 9/25/2023  Interpreted By:  EMMA REAL MD and AAMIR CALDERON MD MRN: [id number] Patient Name: VASQUEZ PAK  STUDY: CHEST 1 VIEW;  9/25/2023 6:13 am  INDICATION: daily icu .  COMPARISON: Single-view chest 09/24/2023  ACCESSION NUMBER(S): [id number]  ORDERING CLINICIAN: BELGICA CLARK  FINDINGS: AP radiograph of the chest was provided.  Right axillary/subclavian artery stent in place.  CARDIOMEDIASTINAL SILHOUETTE: Cardiomediastinal silhouette is moderately enlarged and stable in size and configuration.  LUNGS: Multifocal bilateral ground-glass opacities that are similar to mildly improved in appearance compared to prior  exam. No pleural effusion or pneumothorax.  ABDOMEN: No remarkable upper abdominal findings.  BONES: No acute osseous changes.      1.  Persistent multifocal bilateral ground-glass opacities that are similar to mildly improved in appearance compared to prior exam.  I personally reviewed the images/study and I agree with the findings as stated. This study was interpreted at [place], [place].  MACRO: None    Echocardiogram    Result Date: 9/25/2023  [facility], [address] [place] [address] Tel [phone] and Fax [phone] TRANSTHORACIC ECHOCARDIOGRAM REPORT Patient Name:     VASQUEZ FERRO      Reading Physician:  [id number] Richy PAK MD Study Date:       9/25/2023          Referring           JOSE L BARON Physician: MRN/PID:          [id number]           PCP: Accession/Order#: [id number]          [facility] HHVI Non Location:           Invasive YOB: 1970          Fellow: Gender:           F                  Nurse: Admit Date:       9/22/2023          Sonographer:        Santi Jung [facility] Admission Status: Inpatient -        Additional Staff: Critical/Stat (within 1-3 hours) Height:           152.40 cm          CC Report to:       [facility] Weight:           44.60 kg           Study Type:         Echocardiogram BSA:              1.38 m2 Blood Pressure: 133 /39 mmHg Diagnosis/ICD: I27.20-Pulmonary hypertension, unspecified Indication:    pulmonary hypertension Procedure/CPT: Echo Limited-48996; Color Doppler-52993; Doppler Limited-89927 Patient History: Pertinent History: ESRD, COPD, HFmrEF (45-50%), pulmonary hemosiderosis, HTN, hypoglycemic, bacteremia. Study Detail: The following Echo studies were performed: 2D, M-Mode, Doppler and color flow. Technically challenging study due to patient lying in supine position, body habitus, the patient's lack of  cooperation, prominent lung artifact and patient's arm contracted on chest. Definity used as a contrast agent for endocardial border definition. Total contrast used for this procedure was 2.0 mL via IV push. PHYSICIAN INTERPRETATION: Left Ventricle: The left ventricular systolic function is hyperdynamic, with an estimated ejection fraction of 75-80%. The left ventricular cavity size is normal. The interventricular septum is flattened in systole and diastole, consistent with right ventricular pressure and volume overload. Left ventricular diastolic filling was not assessed. Left Atrium: The left atrium was not assessed. Right Ventricle: The right ventricle is mildly enlarged. There is reduced right ventricular systolic function. There is increased right ventriclar wall thickness. Right Atrium: The right atrium was not assessed. Aortic Valve: The aortic valve appears abnormal. There is moderate to severe aortic valve cusp calcification. There is moderate aortic valve thickening. There is moderate aortic valve regurgitation. There is reduced systolic aortic valve leaflet excursion. Mitral Valve: The mitral valve is moderately thickened. The mitral valve appears to be degenerative. There is severe mitral annular calcification. There is mild mitral valve regurgitation. Tricuspid Valve: The tricuspid valve is abnormal. There is thickening of the tricuspid valve leaflets. There is moderately restricted tricuspid valve leaflet mobility. The tricuspid valve annulus appears dilated. There is severe tricuspid regurgitation. The tricuspid valve regurgitant jet flows toward the atrial septum. The Doppler estimated RVSP is severely elevated at 143.7 mmHg. Pulmonic Valve: The pulmonic valve is structurally normal. There is mild pulmonic valve regurgitation. Pericardium: There is no pericardial effusion noted. Aorta: The aortic root was not assessed. In comparison to the previous echocardiogram(s): Compared with study from  9/6/2023, the technique and dedicated RV images are different. In today's study the degree of tricuspid regurgitation is severe compared with moderate and the RVSP is now severely increased compared to moderate to severe on the prior study. The LVEF is now hyperdynamic compared to mildly reduced. CONCLUSIONS: 1. Left ventricular systolic function is hyperdynamic with a 75-80% estimated ejection fraction. 2. Right ventricular volume and pressure overload. 3. There is reduced right ventricular systolic function. 4. The right ventricle is mildly enlarged. 5. Moderate aortic valve regurgitation. 6. There is moderate to severe aortic valve cusp calcification. 7. Mild mitral valve regurgitation. 8. The mitral valve is moderately thickened. The mitral valve appears to be degenerative. 9. There is severe mitral annular calcification. 10. Severe tricuspid regurgitation visualized. 11. There is significantly restricted tricuspid valve septal leaflet mobility with pseudoprolapse of the anterior leaflet. 12. The tricuspid valve annulus appears dilated. 13. Severely elevated right ventricular systolic pressure. QUANTITATIVE DATA SUMMARY: 2D MEASUREMENTS: Normal Ranges: IVSd:          1.06 cm   (0.6-1.1cm) LVPWd:         1.19 cm   (0.6-1.1cm) LVIDd:         3.12 cm   (3.9-5.9cm) LVIDs:         2.57 cm LV Mass Index: 75.8 g/m2 LV % FS        17.8 % LV SYSTOLIC FUNCTION BY 2D PLANIMETRY (MOD): Normal Ranges: EF-A4C View: 77.0 % (>=55%) EF-A2C View: 81.5 % EF-Biplane:  79.3 % RIGHT VENTRICLE: TAPSE: 17.5 mm RV s'  0.08 m/s TRICUSPID VALVE/RVSP: Normal Ranges: Peak TR Velocity: 5.93 m/s RV Syst Pressure: 143.7 mmHg (< 30mmHg) IVC Diam:         1.80 cm [id number] Richy Emmanuel MD Electronically signed on 9/25/2023 at 3:42:55 PM  Final      XR chest 1 view    Result Date: 9/24/2023  Interpreted By:  PATTI POLLACK MD MRN: [id number] Patient Name: VASQUEZ PAK  STUDY: CHEST 1 VIEW;  9/24/2023 3:32 am  INDICATION: daily icu .   COMPARISON: 09/05/2023.  ACCESSION NUMBER(S): [id number]  ORDERING CLINICIAN: BELGICA CLARK  FINDINGS:      1.  Worsened diffused ground-glass opacities involving the both lungs, worsened pulmonary edema/fluid overload. 2. Cardiac silhouette is moderately enlarged. Aorta is tortuous. Pulmonary vessels are congested. 3. No focal consolidation. 4. No pleural effusion or pneumothorax seen. 5. Removal of the ET tube, enteric tube, pH probe, and the IJ catheter. 6. Vascular stent along the right axillary artery.    MACRO: None    CT head wo IV contrast    Result Date: 9/18/2023  Interpreted By:  DEMETRIO CRAEY MD MRN: [id number] Patient Name: VASQUEZ PAK  STUDY: CT HEAD WO CONTRAST; ;  9/18/2023 10:37 am  INDICATION: change in mental status .  COMPARISON: 09/03/2023  ACCESSION NUMBER(S): [id number]  ORDERING CLINICIAN: DOUGLAS CONNELLY  TECHNIQUE: Serial axial images of the head were obtained without intravenous contrast. Sagittal and coronal reconstructions were generated.  FINDINGS: Ventricles are midline and minimally prominent. There is spotty periventricular white matter disease.  There is no hemorrhage or extra-axial fluid. There are physiologic basal ganglia calcifications.  There is no obvious scalp hematoma or skull fracture.  The visualized portions of the paranasal sinuses unremarkable. There is opacification of the right mastoid air cells.  COMPARISON OF FINDINGS: Brain is similar.      No acute intracranial abnormality.  Right mastoiditis.       XR abdomen 1 view    Result Date: 9/18/2023  Interpreted By:  MERLIN PITTMAN MD MRN: [id number] Patient Name: VASQUEZ PAK  STUDY: ABDOMEN AP VIEW;  9/16/2023 1:56 pm  INDICATION: nausea vomiting .  COMPARISON: 09/06/2023  ACCESSION NUMBER(S): [id number]  ORDERING CLINICIAN: MARÍA BLANDON  FINDINGS: 2 tubal ligation clips are seen. Prominent vascular calcifications are identified. Previously seen nasogastric tube and pH probe have been removed. Previously noted right  central catheter has been removed.  The heart is enlarged. Diffuse bilateral infiltrates remain.      Nonobstructing bowel gas pattern.  Cardiomegaly and diffuse infiltrates, which are partially visualized.   MACRO: None    US historical    Result Date: 9/10/2023  Interpreted By:  ALINE WASHINGTON MD MRN: [id number] Patient Name: VASQUEZ PAK  STUDY: DUPLEX UPPER EXTREMITY VEINS, LEFT, UNILATERAL  9/10/2023 9:38 am  INDICATION: 52 y/o   F with  swelling .  COMPARISON: Left  ACCESSION NUMBER(S): [id number]  ORDERING CLINICIAN: GEOFFREY THOMSON  TECHNIQUE: Routine ultrasound of the  left upper extremity was performed with duplex Doppler (color and spectral) evaluation.   Static images were obtained for remote interpretation.  FINDINGS: UPPER EXTREMITY VEINS:  Examination was performed with color and duplex Doppler.  The internal jugular and subclavian veins are patent and free of thrombus. There is echogenic thrombus centrally in the lumen of the left axillary vein with surrounding flow. This could be chronic. This is nonocclusive. This extends into the brachial vein. The cephalic vein is not visualized. The basilic vein demonstrates normal flow and compressibility. There appears to be surrounding marked edema of the left arm.      Echogenic thrombus in the lumen of the left axillary vein with surrounding flow. This extends into the left brachial vein. Findings are consistent with a left upper extremity deep venous thrombosis. There is marked edema of the subcutaneous fat of the left arm. A doc Halo was sent to Dr. Geoffrey Thomson on 09/10/2023 at 9:59 a.m.  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 Pt had HD at OSH on 9/22 and completed the session. Towards end of HD session, RUE AVG was bleeding excessively. Graft was ligated  on 9/22 and pt transferred to [facility] to have graft removed. On 9/23, pt was scheduled for OR graft explant but case cancelled as pt was hypoglycemic, hypotensive, and altered so transferred to CTICU on 9/23 and transferred back to the floor 10/02/23.  Patient remains weak with poor intake. Dobhoff feeds started.  Due to multiple co morbidities, palliative care is involved.  Hospice will meet with the family today around noon time.  Assessment/Plan     Active Problems:    ESRD (end stage renal disease) on dialysis (CMS/[facility])    Acute on chronic anemia   Patient received one unit of uncrossed matched PRBC  Hemoglobin 5.1 on 10/4/2023    Acute delirium in the setting of prolonged hospitalization, narcotics  Monitor     Right upper AVG bleeding s/p explant and sacral decubitus ulcer debridment  Stable   Vascular advised to continue antibiotics for 2 weeks     Patient is on dilaudid 0.4 mg IVP q 4 hours for pain and palliative has recommended dilaudid 1 mg oral liquid TID   Palliative care recs appreciated.  Hospice team will talk to family this afternoon     HFpEF/Tricuspid regurg/R sided heart failure   Appreciate cards recommendation     Acute Thrombocytopenia-multifactorial  Hematology consulted and appreciate recs      HTN/HLD/Hypotension-- sepsis vs hemorrhagic shock   -Resolved      Chronic hypoxic respiratory failure/COPD on 6 L home O2  -continue duonebs every 4 hours as need it for wheezing      Severe Protein Calorie Malnutrition/Grade D Esophagitis  9/25 EGD: Moderately severe LA Grade D esophagitis with circumferential ulceration and exudate not actively bleeding was found. Coffee grounds in esophagus.  Scattered moderate inflammation and scattered hematin was found in the stomach. No signs of active bleeding observed. No bleeding lesions. The examined duodenum showed erosive peptic duodenitis in setting of melanosis, mainly deep erosions but no vessel or pigmented spots.  -On Dobhoff feeds.   -Continue  aspiration precaution     ESRD on HD (MWF)   Renal following   S/p HD  on 10/4,      Hypoglycemia  resolved      Prophylaxis    On hold due to anemia             Plan discussed with patient at bedside  Disposition: Based on meeting with hospice will decide goals of care and how to proceed.  Patient with overall poor prognosis with multiple active comorbid conditions  High level of MDM based on above issue and discussing plan    This note is created using voice recognition software. All efforts are made to minimize errors, if there are errors there due to transcription.    Nicolas Brown  Hospitalist

## 2023-10-08 NOTE — DISCHARGE SUMMARY
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 Pt had HD at OSH on 9/22 and completed the session. Towards end of HD session, RUE AVG was bleeding excessively. Graft was ligated on 9/22 and pt transferred to [facility] to have graft removed. On 9/23, pt was scheduled for OR graft explant but case cancelled as pt was hypoglycemic, hypotensive, and altered so transferred to CTICU on 9/23 and transferred back to the floor 10/02/23.  Patient remains weak with poor intake. Dobhoff feeds started.  Due to multiple co morbidities, palliative care is involved.  Family was meeting with hospice on 10/7/2023.  After talking to them they decided to stop dialysis and take her home with hospice as per patient's wishes.  Patient does not want to continue with anymore double feeds and wants the Dobbhoff removed.  We will remove the right IJ central line and discharge patient with home hospice.  Hospice nurse practitioner deciding for comfort care medications after discharge.    Pertinent Physical Exam At Time of Discharge  General: Alert, in mild distress, thin, emaciated female.  HEENT: MARY LOU, right IJ central line in place which will be removed prior to discharge.  Patient also has Dobbhoff in place with bridle and place.  That is also going to be removed prior to discharge  Neck: Normal to inspection  Lungs: Clear to auscultation, work of breathing within normal limit.  Right chest wall tunneled dialysis catheter  Cardiac: Regular rate and  rhythm  Abdomen: Soft nontender, positive bowel sounds  : Exam deferred  Skin: Decubitus ulcer  Hematology: No petechia or excessive ecchymosis  Musculoskeletal: Without significant trauma  Neurological: Alert awake oriented x 2, no focal deficit, cranial nerves grossly intact  Psych: No suicidal ideation or homicidal ideation    Home Medications     Medication List      You have not been prescribed any medications.     Comfort care medications will be prescribed by hospice nurse practitioner to the pharmacy directly  Outpatient Follow-Up  No future appointments.  39 minutes spent in discharge timing    This note is created using voice recognition software. All efforts are made to minimize errors, if there are errors there due to transcription.  Hospitalist  Nicolas Brown MD

## 2023-10-08 NOTE — NURSING NOTE
[facility]: I met with pt's spouse, sister, mother yesterday. Initially they were not in agreement with stopping dialysis and having hospice but after discussion with Dr Brown family now in agreement. DC planned for today. DME has been delivered. DC comfort meds ordered via [facility] NP for stat delivery to the home today. Ricardo branch for 3p. DC instructions reviewed with spouse, and [facility] nurse will follow up with family at home. Collab with Dr Brown, Kathleen LEE, Kiki reed of DC plan. Thank you for this referral. [phone] Kriss LEE

## 2023-10-11 ENCOUNTER — HOSPITAL ENCOUNTER (OUTPATIENT)
Dept: CARDIOLOGY | Facility: HOSPITAL | Age: 53
Discharge: HOME | End: 2023-10-11

## 2023-10-11 LAB
ATRIAL RATE: 93 BPM
P AXIS: 76 DEGREES
PR INTERVAL: 88 MS
Q ONSET: 218 MS
QRS COUNT: 15 BEATS
QRS DURATION: 78 MS
QT INTERVAL: 584 MS
QTC CALCULATION(BAZETT): 726 MS
QTC FREDERICIA: 676 MS
R AXIS: 53 DEGREES
T AXIS: -80 DEGREES
T OFFSET: 510 MS
VENTRICULAR RATE: 93 BPM

## 2023-10-11 PROCEDURE — 93005 ELECTROCARDIOGRAM TRACING: CPT

## 2023-10-12 NOTE — CONSULTS
"    Service:   Service: Wound Care     Consult:  Consult requested by (Attending Name): Kamaljit Wu   Reason: Tunneling pressure injury on sacrum     History of Present Illness:   HPI:    VASQUEZ PAK is a 53 year old Female    Review Family/Social History and ROS:   Social History:    Smoking Status: former smoker  (1)   Alcohol Use: denies (1)   Drug Use: denies  (1)            Allergies:  ·  vancomycin : Resp Distress, Itching  ·  carvedilol : Unknown  ·  doxazosin : Unknown  ·  benazepril : Unknown      Assessment:    Wound location: sacrum   size: 7cm x 9.5cm x 1.5cm                            underminincm from [id number]      tracking: none                                        Wound type: stage 3-4 pressure injury  Wound bed: pink with yellow slough; soft tan/brown eschar at wound edges --> photo in EMR  Draining: small SS and yellow  Periwound skin: fragile, pink, denuded  Therapeutic surface: Centrella Pro+    Recommendation: Aggressive turning q2 hours side-to-side to offload sacrum. Cleanse and redress sacral wound DAILY using nickel-thick layer  of Santyl (collagenase) ointment, then piece of Aquacel Ag cut to fit; secure with Mepilex foam. Optimize nutrition.    Provider, please review recs above and write wound care orders accordingly.     Tami Ray RN, BSN, [facility]      Consult Status:  Consult Order ID: [id number]       Electronic Signatures:  Tami Ray (ROSA)  (Signed 25-Sep-2023 15:31)   Authored: Service, History of Present Illness, Review  Family/Social History and ROS, Allergies, Assessment/Recommendations, Note Completion      Last Updated: 25-Sep-2023 15:31 by Tami Ray (RN)    References:  1.  Data Referenced From \"Consult-Acute Care Surgery\" 25-Sep-2023 13:27   "

## 2023-10-12 NOTE — CONSULTS
Service:   Service: Acute Care Surgery     Consult:  Consult requested by (Attending Name): Kamaljit Wu   Reason: large sacral decub     History of Present Illness:   Admission Reason: sacral decub ulcer   HPI:    VASQUEZ PAK is a 53 year old Female w/  PMHx ESRD on iHD MWF, COPD on 6L at baseline,  HFmrEF (45-50% with moderate to severely elevated RVSP 9/2023) HTN, hemosiderosis who was  found at dialysis with hemorrhaging RUE AVG, IR controlled bleeding and patient transferred to  for definitive graft removal c/b hypotension tachycardia hypoglycemia transferred to ICU pending OR date with vascular surgery. ACS being consulted for sacral  decubitus ulcer.     Patient's WBC has been rising despite linezolid/zosyn regimen, concner for infection secondary to RUE graft wound vs PNA vs sacral decubitus ulcer. On examination pt has sacral ulcer with grey fibrinous base, no bleeding with mild purulence, exposed sacral  bone. CT from 9/3 showed little to no tissue covering sacral bone. Pt poor historian, was not able to articulate awareness of sacral ulcer or pain in region.       PMHx: as noted in HPI   PSHx: reviewed   Social hx: smoker 3-4 cigars a day, previous huffing, denies alcohol or current illicit drug use   Family hx: non-contributory   Allergies: Vancomycin- resp distress, carvedilol, doxazosin, benazepril       Review Family/Social History and ROS:   Social History:    Smoking Status: former smoker  (1)   Alcohol Use: denies (1)   Drug Use: denies  (1)            Allergies:  ·  vancomycin : Resp Distress, Itching  ·  carvedilol : Unknown  ·  doxazosin : Unknown  ·  benazepril : Unknown    Objective:     Objective Information:        T   P  R  BP   MAP  SpO2   Value  36.3  98  17  157/44   74  100%  Date/Time 9/25 12:00 9/25 13:00 9/25 13:00 9/24 5:00  9/24 5:00 9/25 13:00  Range  (36.1C - 36.5C )  (90 - 109 )  (11 - 27 )  (128 - 173 )/ (44 - 128 )  (71 - 141 )  (72% - 100% )   As of  25-Sep-2023 12:00:00, patient is on 3 L/min of oxygen via nasal cannula.    Physical Exam by System:    Constitutional: NAD, awake/alert   Head/Neck: Neck supple, no apparent injury   Respiratory/Thorax: Patent airways, normal breath  sounds   Cardiovascular: Regular, rate and rhythm   Gastrointestinal: Nondistended, soft, non-tender   Musculoskeletal: ROM intact, no joint swelling, normal  strength   Extremities: normal extremities, no cyanosis edema,  contusions or wounds, no clubbing   Psychological: Appropriate mood and behavior   Skin: large sacral ulcer, fibrinous exudate circumferentially,  no bleeding or overt purulence, exposed bone     Recent Lab Results:    Results:        I have reviewed these laboratory results:    Renal Function Panel  25-Sep-2023 11:56:00      Result Value    Glucose, Serum  74    NA  133   L   K  4.4    CL  95   L   Bicarbonate, Serum  27    Anion Gap, Serum  15    BUN  33   H   CREAT  3.63   H   GFR Female  14   A   Calcium, Serum  8.9    Phosphorus, Serum  3.2    ALB  2.8   L     Magnesium, Serum  25-Sep-2023 11:56:00      Result Value    Magnesium, Serum  2.13      Complete Blood Count  25-Sep-2023 11:54:00      Result Value    Lab Comment:  CRITICAL HGB CALLED RB TO HUMBERTO ROSE, 09/25/2023 13:11    White Blood Cell Count  18.1   H   Nucleated Erythrocyte Count  0.0    Red Blood Cell Count  2.18   L   HGB  6.5   LL   HCT  19.0   L   MCV  87    MCHC  34.2    PLT  87   L   RDW-CV  16.9   H         Assessment:    VASQUEZ PAK is a 53 year old Female w/  PMHx ESRD on iHD MWF, COPD on 6L at baseline,  HFmrEF (45-50% with moderate to severely elevated RVSP 9/2023) HTN, hemosiderosis who was  found at dialysis with hemorrhaging RUE AVG, IR controlled bleeding and patient transferred to  for definitive graft removal c/b hypotension tachycardia hypoglycemia transferred to ICU pending OR date with vascular surgery. ACS being consulted for sacral  decubitus ulcer.  "      Sacral decubitus ulcer requires debridement, will join vascular surgery in OR and debride intraop.       Discussed with Dr. Cheko Nieto MD  [facility]   [id number]     Consult Status:  Consult Status    (select all that apply): initial  consult complete, will follow   Consult Order ID: [id number]     Attestation:   Note Completion:  I am a:  Resident/Fellow   Attending Attestation I saw and evaluated the patient.  I personally obtained the key and critical portions of the history and physical exam or was physically present for key and  critical portions performed by the resident/fellow. I reviewed the resident/fellow?s documentation and discussed the patient with the resident/fellow.  I agree with the resident/fellow?s medical decision making as documented in the note.     I personally evaluated the patient on 25-Sep-2023   Comments/ Additional Findings    I evaluated and examined the patient with the surgical team. I agree with the above findings, assessment and plan.     Will debride sacral decubitus in OR as combination case with vascular surgery          Electronic Signatures:  Romana Nieto (Resident))  (Signed 25-Sep-2023 13:42)   Authored: Service, History of Present Illness, Review  Family/Social History and ROS, Allergies, Objective, Assessment/Recommendations, Note Completion  Ghanshyam Still)  (Signed 25-Sep-2023 17:40)   Authored: Assessment/Recommendations, Note Completion   Co-Signer: History of Present Illness, Objective, Assessment/Recommendations, Note Completion      Last Updated: 25-Sep-2023 17:40 by Ghanshyam Still (MD)    References:  1.  Data Referenced From \"Consult-Nephrology\" 24-Sep-2023 15:39   "

## 2023-10-12 NOTE — CONSULTS
Service:   Service: Palliative Care     Consult:  Consult requested by (Attending Name): Lilian Urbano   Reason: Chronically ill 53 year old female; needs  GOC     History of Present Illness:   HPI:    Ms. 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 Graft was ligated on 9/22 and she was transferred to [facility] to have graft removed. On 9/23, pt was scheduled for OR graft explant but case cancelled as pt was hypoglycemic,  hypotensive, and altered so transferred to CTICU on 9/23 c/w septic shock. Palliative Care was consulted to assist with medical decision making.     Symptoms- were unobtainable as patient was very ill and minimally communicative.     Serious Illness Conversation- per conversation with Timmy (), Carlota (mother), Anna (sister)  Information: full disclosure  Understanding: fair understanding;   Prognosis: discussed that time could be short.   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 Enjoys TV (reality shows), games on her phone and computer; card games; puzzles, music- all types- R&B, Jazz;  gospel.   Goals: get well enough to get home. Make sure she's as comfortable as possible, brittani as we figure things out.   Timmy was overwhelmed talking further given he's overwhelmed with grief given how ill patient is and was unable to answer questions about:  -Fears and worries:   -Minimal acceptable outcome:  -Maximal burden:    Advance Care Planning:  Health Care Agent-   1. Tae  2. Mother- Carlota  Advance directives- completed [facility]. Asked for documentation to be brought in.     Family Hx:  M- HTN, hypothyroidism  F- CAD- stented x3; PPM, HTN  Sister- HTN, arrhythmias  Oldest Daughter-  lupus  No heart failure hx    Social Hx:   for 8 years to Timmy. 3 children- they are all adults- 1 boys and 2 girls. Lives in a house. 3 steps going in. 12 going up steps to 2nd floor where bedrooms are.   Worked as a  for [employer]. Hasn't worked in over 10 years due to medical conditions.   Smoking- 1 cig a day  ETOH- none  Illicit drugs- none.      Spiritual Hx:  Buddhism- non-Spiritism          Review Family/Social History and ROS:   Social History:    Smoking Status: former smoker   Alcohol Use: denies   Drug Use: denies            Allergies:  ·  vancomycin : Resp Distress, Itching  ·  carvedilol : Unknown  ·  doxazosin : Unknown  ·  benazepril : Unknown    Objective:   Physical Exam by System:    Constitutional: Ill appearing. Lethargic. Cachectic.   Eyes: anicteric. proptosis.   Head/Neck: NC/AT. Temporal wasting.   Respiratory/Thorax: Rhonchi bilaterally.   Cardiovascular: S1 S2 RRR; 3/6 STEFAN.   Gastrointestinal: Soft. BS active.   Extremities: RUE - ace wrapped.   Neurological: aoursable.   Skin: some pressure ulcers.     Medications:    Medications:    I have reviewed active medication orders.      Recent Lab Results:    Results:        I have reviewed these laboratory results:    Complete Blood Count  26-Sep-2023 15:10:00      Result Value    White Blood Cell Count  14.7   H   Nucleated Erythrocyte Count  0.0    Red Blood Cell Count  3.09   L   HGB  9.1   L   HCT  26.0   L   MCV  84    MCHC  35.0    PLT  50   L   RDW-CV  15.4   H     Coagulation Screen  26-Sep-2023 09:37:00      Result Value    Prothrombin Time, Plasma  30.1   H   International Normalized Ratio, Plasma  2.6   H   Activated Partial Thromboplastin Time  55   H     Troponin I, High Sensitivity  26-Sep-2023 09:37:00      Result Value    Troponin I, High Sensitivity  353   H     Comprehensive Metabolic Panel  26-Sep-2023 06:55:00      Result Value    Glucose, Serum  71   L   NA  130   L   K  4.5    CL  94   L   Bicarbonate,  Serum  23    Anion Gap, Serum  18    BUN  38   H   CREAT  3.57   H   GFR Female  15   A   Calcium, Serum  8.9    ALB  3.0   L   ALKP  150   H   T Pro  4.8   L   T Bili  1.5   H   Alanine Aminotransferase, Serum  4   L   Aspartate Transaminase, Serum  7   L     Sedimentation Rate, Erythrocyte  26-Sep-2023 06:55:00      Result Value    Sedimentation Rate, Erythrocyte  <1         Radiology Results:    Results:        Conclusion:  CONCLUSIONS:  1. Left ventricular systolic function is hyperdynamic with a 75-80% estimated ejection fraction.  2. Right ventricular volume and pressure overload.  3. There is reduced right ventricular systolic function.  4. The right ventricle is mildly enlarged.  5. Moderate aortic valve regurgitation.  6. There is moderate to severe aortic valve cusp calcification.  7. Mild mitral valve regurgitation.  8. The mitral valve is moderately thickened. The mitral valve appears to be degenerative.  9. There is severe mitral annular calcification.  10. Severe tricuspid regurgitation visualized.  11. There is significantly restricted tricuspid valve septal leaflet mobility with pseudoprolapse of the anterior leaflet.  12. The tricuspid valve annulus appears dilated.  13. Severely elevated right ventricular systolic pressure.    *** Final ***     Echocardiogram [Sep 25 2023  3:43PM]      Impression:    1.  Worsened diffused ground-glass opacities involving the both  lungs, worsened pulmonary edema/fluid overload.  2. Cardiac silhouette is moderately enlarged. Aorta is tortuous.  Pulmonary vessels are congested.  3. No focal consolidation.  4. No pleural effusion or pneumothorax seen.  5. Removal of the ET tube, enteric tube, pH probe, and the IJ  catheter.  6. Vascular stent along the right axillary artery.        Xray Chest 1 View [Sep 24 2023 10:09AM]      Impression:    No acute intracranial abnormality.     Right mastoiditis.      CT Head without Contrast [Sep 18 2023 10:52AM]           Assessment:    Ms. 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 Graft was ligated on 9/22 and she was transferred to [facility] to have graft removed. On 9/23, pt was scheduled for OR graft explant but case cancelled as pt was hypoglycemic,  hypotensive, and altered so transferred to CTICU on 9/23 c/w septic shock. Palliative Care was consulted to assist with medical decision making.     Problem List in addition to above:   Protein energy malnutrition  Encephalopathy    Goals: get well enough to get home. Make sure she's as comfortable as possible, brittani as we figure things out.   When asked about minimal acceptable outcome, Timmy shared that he was overwhelmed and could not make decisions.   He's already grieving about how sick  is and recognizes that prognosis could be poor.     Advance Care Planning:  Health Care Agent-   1. Jesseeer  2. Mother- Carlota  Advance directives- completed [facility]. Asked to bring them in.     Will return to speak with family + patient about goals and minimal acceptable outcome.   We will continue to provide support for family.   Music therapy will be helpful.    support.     Madeline Locke MD [facility]  Palliative Medicine   [email or web address]    Spent 20 mins with advance care planning.     Consult Status:  Consult Status    (select all that apply): initial  consult complete, will follow   Consult Order ID: [id number]       Electronic Signatures:  Chaparro Locke)  (Signed 28-Sep-2023 20:31)   Entered: Service, History of Present Illness, Review  Family/Social History and ROS, Allergies, Objective, Assessment/Recommendations   Authored: Service, History of Present Illness, Review Family/Social History and ROS, Allergies, Objective, Assessment/Recommendations,  Note  Completion      Last Updated: 28-Sep-2023 20:31 by Chaparro Locke)

## 2023-10-12 NOTE — CONSULTS
Service:   Service: Infectious Disease     Consult:  Consult requested by (Attending Name): Kamaljit Wu   Reason: leukocytosis, allergic to vanco     History of Present Illness:   HPI:    History obtained via chart review. Patient is a poor historian and only intermittently answers questions.   VASQUEZ PAK is a 53 year old w/PMHx ESRD on iHD MWF,  COPD on 6L at baseline,  HFmrEF (45-50% with moderate to severely elevated RVSP 9/2023) HTN, hemosiderosis. Who initially presented to [facility] ED on 9/2/23 and admitted to ICU with AHRF/Septic shock with LLL PNA requiring pressor support and intubation.  She had only been receiving half dialysis sessions the few days prior to presentation and required CVVH while in ICU. Blood cultures ½ sets growing actenobacter baumannii for which ID was consulted and suspected 2/2 PNA vs sacral wounds and recommended  switching to Cefepime to complete 14 day course through 9/16/23. Hospital course c/b thrombocytopenia for which PF4 was negative. Found to have LUE DVT via U/S 9/10 L axillary vein extending into brachial vein started on eliquis. ECHO showing worsening  heart failure now with mrEF 45-50% compared to 60-65% in 2020 with worsening elevation of RVSP and stable valvular disease. On 9/11/23 patient left AMA but was SOB upon trying to get to her car and represented to the ED and was readmitted to the SDU then  to [facility]. On 9/22 while at dialysis her RUE AVF started bleeding profusely and IR was consulted and found exposed graft and controlled bleeding with sutures and compression. Patient was subsequently transferred to [facility] on 9/22/23 for vascular surgery evaluation.    Since being at [facility] on 9/22/23 has been under vascular surgery team which had initially planned for explant of AVF graft on 9/23 however had eaten something  in AM of 9/23 thus surgery was cancleled, she was also found to be hypoglycemic, hypotensive and tachycardic and transferred to CTICU  with D10 ggt improved hypoglycemia. Was transfused 1u uncrossed PRBCs for Hgb 6.3 and subsequently found to have blood  antibodies and surgery has been further pushed back to Tuesday 9/26/23 while awaiting blood from the [place]. Per bedside RN, patient developed dark tarry stool 9/25 AM.     At bedside, patient able to state that she lives in [place]. Was not able to say if anyone lived with her. Endorsed pain but was unable to localize.     PMH: HTN, HLD, ESRD (MWF RUE AVG), hypothyroid, HFpEF (~70% LVEF), LUISITO, COPD (6L NC), aortic regurg, aortic stenosis, hx DAH, bacteremia, multiple AMA discharges, noncompliant with  care, numerous admissions for missed dialysis sessions  PSH: RUE brach-ax graft placed by Dr. Gan 8-10 years ago  Soc hx: 3-4 cigars daily; no drug or alcohol use  Allergies: Vancomycin- respiratory distress     Micro  9/2 blood cx: Acinetobacter 1/2 9/7 tracheal aspirate: negative  9/11 blood cx: negative  9/23 blood cx: negative    Antibiotics:  Pip-tazo: 9/3-9/5, 9/22-p  Cefepime 9/5-9/16  Linezolid 9/23 - present    Review Family/Social History and ROS:   Social History:    Smoking Status: former smoker  (1)   Alcohol Use: denies (1)   Drug Use: denies  (1)            Allergies:  ·  vancomycin : Resp Distress, Itching  ·  carvedilol : Unknown  ·  doxazosin : Unknown  ·  benazepril : Unknown    Objective:     Objective Information:        T   P  R  BP   MAP  SpO2   Value  36.3  95  15  157/44   74  94%  Date/Time 9/25 12:00 9/25 14:00 9/25 14:00 9/24 5:00  9/24 5:00 9/25 14:00  Range  (36.1C - 36.5C )  (90 - 109 )  (11 - 27 )  (128 - 173 )/ (44 - 128 )  (71 - 141 )  (72% - 100% )   As of 25-Sep-2023 12:00:00, patient is on 3 L/min of oxygen via nasal cannula.    Physical Exam Narrative:  ·  Physical Exam:    GENERAL: cachectic, ill-appearing female, lying in bed  HEENT: NC/AT, poor dentition, dry mucous membranes  CV: RRR, no murmur  PULM: CTAB, bibasilar crackles  ABD: soft,  nontender, nondistended. No rebound or guarding. No organomegaly  EXT: no pitting edema. pIV in left foot. SCDs in place. RUE wrapped in kerlix, no discharge on dressing. Left brachial A-line and pIV in place  NEURO: awake, alert, intermittently answers questions in 1-2 words, follows commands, EOMI  PSYCH: patient has poor eye contact, speaks 1-2 word sentances, perseverates on answers to prior questions      Medications:    Medications:          Continuous Medications       --------------------------------    1. Dextrose 10% in Water Infusion:  500  mL  IntraVenous  <Continuous>         Scheduled Medications       --------------------------------    1. Albumin 25% IV Bolus:  25  gram(s)  IntraVenous Piggyback  Every 6 Hours    2. Levothyroxine:  175  microgram(s)  Oral  Daily    3. Linezolid 600 mg IVPB/ Premixed Soln 300 mL:  300  mL  IntraVenous Piggyback  Every 12 Hours    4. Melatonin:  3  mg  Oral  Daily 1800    5. Midodrine:  10  mg  Oral  Every 8 Hours    6. Pantoprazole Injectable:  40  mg  IntraVenous Push  Every 12 Hours    7. Piperacillin - Tazobactam 2.25 grams/Iso-osmotic 50 mL Premix IVPB:  50  mL  IntraVenous Piggyback  Every 8 Hours         PRN Medications       --------------------------------    1. Acetaminophen:  650  mg  Oral  Every 4 Hours    2. Albuterol 2.5 mg - Ipratropium 0.5 mg/ 3 mL Neb Soln:  3  mL  Inhalation  4 Times a Day    3. Calcium Gluconate 1 gram/ NaCL 0.67% 50 mL Premix IVPB:  50  mL  IntraVenous Piggyback  Every 6 Hours    4. Calcium Gluconate 2 gram/ NaCL 0.67% 100 mL Premix IVPB:  100  mL  IntraVenous Piggyback  Every 6 Hours    5. Dextrose 50% in Water Injectable:  25  gram(s)  IntraVenous Push  Every 15 Minutes    6. Dextrose 50% in Water Injectable:  12.5  gram(s)  IntraVenous Push  Every 15 Minutes    7. Glucagon Injectable:  1  mg  IntraMuscular  Every 15 Minutes    8. Magnesium Sulfate 2 gram/Sterile Water 50 mL Premix Soln:  2  gram(s)  IntraVenous Piggyback  Every 6  Hours    9. Magnesium Sulfate 4 gram/Sterile Water 100 mL Premix Soln:  4  gram(s)  IntraVenous Piggyback  Every 6 Hours    10. Melatonin:  3  mg  Oral  Once    11. Ondansetron Injectable:  4  mg  IntraVenous Push  Every 6 Hours    12. oxyCODONE Immediate Release:  5  mg  Oral  Every 4 Hours    13. Sodium Chloride 0.9% Injectable Flush:  10  mL  IntraVenous Flush  Every 8 Hours and as Needed         Conditional Medication Orders       --------------------------------    1. Perflutren Lipid Microsphere (Activated) 1.3 mL / NaCL 0.9% T.V. 10 mL Injectable:  0.5  mL  IntraVenous Push  Once      Recent Lab Results:    Results:        I have reviewed these laboratory results:    Renal Function Panel  25-Sep-2023 11:56:00      Result Value    Glucose, Serum  74    NA  133   L   K  4.4    CL  95   L   Bicarbonate, Serum  27    Anion Gap, Serum  15    BUN  33   H   CREAT  3.63   H   GFR Female  14   A   Calcium, Serum  8.9    Phosphorus, Serum  3.2    ALB  2.8   L     Magnesium, Serum  25-Sep-2023 11:56:00      Result Value    Magnesium, Serum  2.13      Complete Blood Count  25-Sep-2023 11:54:00      Result Value    Lab Comment:  CRITICAL HGB CALLED RB TO HUMBERTO ROSE, 09/25/2023 13:11    White Blood Cell Count  18.1   H   Nucleated Erythrocyte Count  0.0    Red Blood Cell Count  2.18   L   HGB  6.5   LL   HCT  19.0   L   MCV  87    MCHC  34.2    PLT  87   L   RDW-CV  16.9   H       Radiology Results:    Results:        Impression:    1.  Worsened diffused ground-glass opacities involving the both  lungs, worsened pulmonary edema/fluid overload.  2. Cardiac silhouette is moderately enlarged. Aorta is tortuous.  Pulmonary vessels are congested.  3. No focal consolidation.  4. No pleural effusion or pneumothorax seen.  5. Removal of the ET tube, enteric tube, pH probe, and the IJ  catheter.  6. Vascular stent along the right axillary artery.           MACRO:  None     Xray Chest 1 View [Sep 24 2023  10:09AM]      Impression:    No acute intracranial abnormality.     Right mastoiditis.           CT Head without Contrast [Sep 18 2023 10:52AM]      Impression:    Nonobstructing bowel gas pattern.     Cardiomegaly and diffuse infiltrates, which are partially visualized.        MACRO:  None     Xray Abdomen AP View [Sep 18 2023  8:41AM]      Conclusion:  CONCLUSIONS:  1. Left ventricular systolic function is mildly decreased with a 45-50% estimated ejection fraction.  2. Spectral Doppler shows an impaired relaxation pattern of left ventricular diastolic filling.  3. There is moderate concentric left ventricular hypertrophy.  4. Left ventricular cavity size is decreased.  5. There is low normal right ventricular systolic function.  6. The left atrium is severely dilated.  7. There is moderate mitral annular calcification.  8. Mild to moderate aortic valve stenosis.  9. There is moderate aortic valve cusp calcification.  10. Mild to moderate aortic valve regurgitation.  11. Moderate to severely elevated pulmonary artery pressure.  12. Compared with study from 1/15/2020, there is a significant reduction in LVEF and increase in pulmonary hypertension.    QUANTITATIVE DATA SUMMARY:  2D MEASUREMENTS:  Normal Ranges:  LAs:           2.80 cm    (2.7-4.0cm)  IVSd:          1.20 cm    (0.6-1.1cm)  LVPWd:         1.10 cm    (0.6-1.1cm)  LVIDd:         3.70 cm    (3.9-5.9cm)  LVIDs:         2.80 cm  LV Mass Index: 100.5 g/m2  LV % FS        24.3 %    LA VOLUME:  Normal Ranges:  LA Vol A4C:        115.0 ml   (22+/-6mL/m2)  LA Vol A2C:        101.1 ml  LA Vol BP:     113.5 ml  LA Vol Index A4C:  83.7ml/m2  LA Vol Index A2C:  73.6 ml/m2  LA Vol Index BP:   82.6 ml/m2  LA Area A4C:       28.4 cm2  LA Area A2C:       25.3 cm2  LA Major Axis A4C: 6.0 cm  LA Major Axis A2C: 5.4 cm  LA Volume Index:   82.6 ml/m2    RA VOLUME BY A/L METHOD:  Normal Ranges:  RA Area A4C: 21.9 cm2    AORTA MEASUREMENTS:  Normal Ranges:  Ao Sinus, d: 2.44  cm (2.1-3.5cm)  Ao STJ, d:   2.17 cm (1.7-3.4cm)  Asc Ao, d:   2.39 cm (2.1-3.4cm)    LV SYSTOLIC FUNCTION BY 2D PLANIMETRY (MOD):  Normal Ranges:  EF-A4C View: 44.0 % (>=55%)  EF-A2C View: 55.1 %  EF-Biplane:  45.5 %    MITRAL VALVE:  Normal Ranges:  MV Vmax:    1.81 m/s  (<=1.3m/s)  MV peak P.1 mmHg (<5mmHg)  MV mean P.0 mmHg  (<2mmHg)    AORTIC VALVE:  Normal Ranges:  AoV Vmax:                2.92 m/s  (<=1.7m/s)  AoV Peak P.1 mmHg (<20mmHg)  AoV Mean P.0 mmHg (1.7-11.5mmHg)  LVOT Max Steven:            0.86 m/s  (<=1.1m/s)  AoV VTI:                 39.00 cm  (18-25cm)  LVOT VTI:               22.50 cm  LVOT Diameter:           1.40 cm   (1.8-2.4cm)  AoV Area, VTI:           0.89 cm2  (2.5-5.5cm2)  AoV Area,Vmax:           0.46 cm2  (2.5-4.5cm2)  AoV Dimensionless Index: 0.58      RIGHT VENTRICLE:  RV Basal 4.26 cm  RV Mid3.41 cm  RV Major 7.7 cm  TAPSE:   16.1 mm    TRICUSPID VALVE/RVSP:  Normal Ranges:  Peak TR Velocity: 3.98 m/s  Est. RA Pressure: 3 mmHg  RV Syst Pressure: 66.4 mmHg (< 30mmHg)  IVC Diam:         1.47 cm    PULMONIC VALVE:  Normal Ranges:  PV Accel Time: 137 msec  (>120ms)  PV Max Steven:    1.1 m/s   (0.6-0.9m/s)  PV Max P.2 mmHg  PIEDV:         2.38 m/s  PADP:          25.7 mmHg      [id number] Edi Vega MD  Electronically signed on 2023 at 1:22:50 PM        *** Final ***     Echocardiogram [Sep  6 2023  1:22PM]        Assessment:    Ms Kesha Lowe is a 53yoF w/PMHx ESRD on iHD MWF, COPD on 6L at baseline,  HFmrEF (45-50% with moderate to severely elevated RVSP 2023)  HTN, pulmonary hemosiderosis (hx DAH  huffing). Who is admitted to  CTICU for RUE AVF exposed graft c/b bleeding 23 with current [facility] [place] [facility] [place] c/b hypoglycemia, anemia with autoantibodies , hypotension.   ID consulted for c/f AVG infection and chronic sacral decubitus wound, patient with vancomycin allergy.     #RUE AVG  with exposed graft  material  -must be assumed to be infected  ::Occurred 9/22 while at dialysis s/p sutures by IR and control of bleeding   -Vascular surgery on board and plan for OR 9/26/23 with ACS  -currently on linezolid (vancomycin allergy) and pip-tazo  -blood culture 9/23 NGTD    #Sacral decubitus wound  -ACS following, plan for debridement on 9/26    #History of Acinetobacter bacteremia (treatment course completed) and staph lugdenensis bacteremia in July    Recommendations  -Continue pip-tazo and linezolid  -Please send intra-op specimens for bacterial and fungal cultures  -Please obtain HIV screen and viral hepatitis panel  -ID will continue to follow    Patient discussed with Dr. Pina Hancock, PGY-3 Internal Medicine  ID Team A [id number]  New consults [id number]    Consult Status:  Consult Status    (select all that apply): initial  consult complete, will follow   Consult Order ID: [id number]     Attestation:   Note Completion:  I am a:  Resident/Fellow   Attending Attestation I saw and evaluated the patient.  I personally obtained the key and critical portions of the history and physical exam or was physically present for key and  critical portions performed by the resident/fellow. I reviewed the resident/fellow?s documentation and discussed the patient with the resident/fellow.  I agree with the resident/fellow?s medical decision making as documented in the note.     I personally evaluated the patient on 25-Sep-2023         Electronic Signatures:  Mich Heller)  (Signed 25-Sep-2023 18:04)   Authored: Assessment/Recommendations, Note Completion   Co-Signer: Assessment/Recommendations, Note Completion  Jessenia Hancock (Resident))  (Signed 25-Sep-2023 16:47)   Authored: Service, History of Present Illness, Review  Family/Social History and ROS, Allergies, Objective, Assessment/Recommendations, Note Completion      Last Updated: 25-Sep-2023 18:04 by Mich Heller)    References:  1.  Data Referenced From  "\"Consult-Acute Care Surgery\" 25-Sep-2023 13:27   "

## 2023-10-12 NOTE — CONSULTS
Service:   Service: Medicine     Consult:  Consult requested by (Attending Name): Kamaljit Wu   Reason: Management of multiple comorbidites transfer  of service     History of Present Illness:   HPI:    VASQUEZ PAK is a 53 year old w/PMHx ESRD on iHD MWF,  COPD on 6L at baseline,  HFmrEF (45-50% with moderate to severely elevated RVSP 9/2023) HTN, hemosiderosis. Who initially presented to [facility] ED on 9/2/23 and admitted to ICU with AHRF/Septic shock with LLL PNA requiring pressor support and intubation.  She had only been receiving half dialysis sessions the few days prior to presentation and required CVVH while in ICU. Blood cultures ½ sets growing actenobacter baumannii for which ID was consulted and suspected 2/2 PNA vs sacral wounds and recommended  switching to Cefepime to complete 14 day course through 9/16/23. Hospital course c/b thrombocytopenia for which PF4 was negative. Found to have LUE DVT via U/S 9/10 L axillary vein extending into brachial vein started on eliquis. ECHO showing worsening  heart failure now with mrEF 45-50% compared to 60-65% in 2020 with worsening elevation of RVSP and stable valvular disease. On 9/11/23 patient left AMA but was SOB upon trying to get to her car and represented to the ED and was readmitted to the SDU then  to [facility]. On 9/22 while at dialysis her RUE AVF started bleeding profusely and IR was consulted and found exposed graft and controlled bleeding with sutures and compression. Patient was subsequently transferred to [facility] on 9/22/23 for vascular surgery evaluation.    Since being at [facility] on 9/22/23 has been under vascular surgery team which had initially planned for explant of AVF graft on 9/23 however had eaten something in AM of 9/23 thus surgery was cancleled, she was also found to be hypoglycemic, hypotensive  and tachycardic and transferred to CTICU with D10 ggt improved hypoglycemia. Was transfused 1u uncrossed PRBCs for Hgb 6.3 and  subsequently found to have blood antibodies and surgery has been further pushed back to Tuesday 9/26/23 while awaiting blood  from the [facility].     Home meds   Amlodipine 10mg qD   Clonidine 0.6mg BID   Hydralazine 50mg BID   Torsemide 100mg qD   Guafacine 2mg qHS   Duoneb q6 PRN   Albuterol q6 PRN   Levothyroxine 175mg qD   Hydroxyzine 25mg QID PRN   Gabapentin 200mg MWF before dialysis   Trazodone 5omg qHS PRN   Prednisone 5mg qD PRN leg pain?  Docusate 100mg BID PRN   Epoetin 8000u w/dialysis     PMHx: as noted in HPI   PSHx: reviewed   Social hx: smoker 3-4 cigars a day, previous huffing, denies alcohol or current illicit drug use   Family hx: non-contributory   Allergies: Vancomycin- resp distress, carvedilol, doxazosin, benazepril       Review Family/Social History and ROS:   Social History:    Smoking Status: former smoker  (1)   Alcohol Use: denies (1)   Drug Use: denies  (1)            Allergies:  ·  vancomycin : Resp Distress, Itching  ·  carvedilol : Unknown  ·  doxazosin : Unknown  ·  benazepril : Unknown    Objective:   Physical Exam Narrative:  ·  Physical Exam:    General:  Patient is awake, alert, and oriented.  Patient is in no acute distress.  HEENT:  Pupils equal and reactive.  Normocephalic.  dry mucus membranes  Neck:  No thyromegaly.  Normal Jugular Venous Pressure.  Cardiovascular:  Regular rate and rhythm. murmur ascultated  Pulmonary:  Clear to auscultation bilaterally.  Abdomen:  Soft. Non-tender.   Non-distended.  Positive bowel sounds.  Lower Extremities:  2+ pedal pulses. No LE edema.  Neurologic:  Cranial nerves intact.  No focal deficit.   Skin: Skin warm and dry, RUE with ace wrap in place covering known AVF open wound  Psychiatric: Normal affect.      Medications:    Medications:          Continuous Medications       --------------------------------    1. Dextrose 10% in Water Infusion:  500  mL  IntraVenous  <Continuous>    2. Phenylephrine 10 mg/ NaCL 0.9% 250 mL Infusion:  0.5   mcg/kg/min  IntraVenous  <Continuous>         Scheduled Medications       --------------------------------    1. Levothyroxine:  175  microgram(s)  Oral  Daily    2. Linezolid 600 mg IVPB/ Premixed Soln 300 mL:  300  mL  IntraVenous Piggyback  Every 12 Hours    3. Melatonin:  3  mg  Oral  Daily 1800    4. Piperacillin - Tazobactam 2.25 grams/Iso-osmotic 50 mL Premix IVPB:  50  mL  IntraVenous Piggyback  Every 8 Hours         PRN Medications       --------------------------------    1. Acetaminophen:  650  mg  Oral  Every 4 Hours    2. Albuterol 2.5 mg - Ipratropium 0.5 mg/ 3 mL Neb Soln:  3  mL  Inhalation  4 Times a Day    3. Calcium Gluconate 1 gram/ NaCL 0.67% 50 mL Premix IVPB:  50  mL  IntraVenous Piggyback  Every 6 Hours    4. Calcium Gluconate 2 gram/ NaCL 0.67% 100 mL Premix IVPB:  100  mL  IntraVenous Piggyback  Every 6 Hours    5. Dextrose 50% in Water Injectable:  12.5  gram(s)  IntraVenous Push  Every 15 Minutes    6. Dextrose 50% in Water Injectable:  25  gram(s)  IntraVenous Push  Every 15 Minutes    7. Glucagon Injectable:  1  mg  IntraMuscular  Every 15 Minutes    8. Magnesium Sulfate 2 gram/Sterile Water 50 mL Premix Soln:  2  gram(s)  IntraVenous Piggyback  Every 6 Hours    9. Magnesium Sulfate 4 gram/Sterile Water 100 mL Premix Soln:  4  gram(s)  IntraVenous Piggyback  Every 6 Hours    10. Melatonin:  3  mg  Oral  Once    11. Ondansetron Injectable:  4  mg  IntraVenous Push  Every 6 Hours    12. oxyCODONE Immediate Release:  5  mg  Oral  Every 4 Hours    13. Potassium Chloride 20 mEq/Sterile Water 100 mL Premix IVPB:  20  mEq  IntraVenous Piggyback  Every 6 Hours    14. 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 Who  is admitted to  CTICU for RUE AVF exposed graft c/b bleeding 9/22/23 with current [facility] stay c/b hypoglycemia, anemia with autoantibodies , hypotension. Medicine was consulted for transfer of service.     NO TRANSFER OF MEDICINE  SERVICE YET GIVEN MAPs 50s and poor IV access    #RUE AVF Exposed graft   ::Occurred 9/22 while at dialysis s/p sutures by IR and control of bleeding   -Vascular surgery on board and plan for OR 9/26/23   -Maintain active T&S  -Coags, EKG, NPO on 9/25 PM     #Anemia   #Autoantibodies   ::Received 1u uncrossed blood 9/23   -Autoantibody panel sent   -Need f/u with [facility] regarding blood products for OR     #ESRD on MWF  ::Currently no access given RUE AVF graft exposed   ::Has LUE axillary into brachial DVT   -Will need IR for tunneled dialysis catheter tomorrow Monday 9/25/23- Place order for angio consult   -no acute indication of dialysis   -Ensure nephrology aware of patient   -Daily RFP to assess need for dialysis   -careful with fluids (on 30cc/hr D10) given anuric   -holding home Torsemide 10mg qD (appears to be anuric)     #Hypoglycemia   ::Likely initially in setting of sepsis but now likely 2/2 poor PO intake and NPO   ::intermittent hypoglycemia documented since admission 9/2/23 at [facility]  ::Multiple hypoglycemic readings 9/23/23 requiring D10 ggt and ICU transfer for close BG monitoring   -on D10 ggt at 30cc/hr no hypoglycemic episodes since starting   -Encourage PO intake and wean ggt as able   -q4-6hr BG checks   -Hypoglycemia protocol     #Leuckocytosis   ::Possibly 2/2 RUE graft wound vs sacral decubitus vs PNA   ::WBC 11.7 9/23 -> 17.1 9/24   ::Completed 14 days abx zosyn 9/3-9/5 & cefepime 9/5-9/16   -Follow up Blood cx   -Obtain procalcitonin   -Obtain MRSA Nares   -Obtain Sputum culture   -Consult ID (can wait until tomorrow)   -Consult wound care for sacral decubitus ulcer   -c/w linezolid (9/23- )   -c/w zosyn (9/22- )     #LUE DVT   ::DVT U/S 9/10 with L  Axiallary into brachial vein DVT   ::Heparin ggt 9/10   ::Received Apixaban 10mg 9/12-9/21 AM   -Hold anticoagulation given soft BPs and anemia yesterday with recent vascular bleed pending OR   -If HDS for 24hrs can consider heparin ggt prior to OR with holding when appropriate for OR purposes     #Chronic hypoxic respiratory failure   ::Home 6L O2   ::Recent LLL PNA requiring intubation 9/2-9/9  & septic shock s/p 14 days cefepime completed 9/16/23   ::Currently on 3L  NC SPO2 96%   -Wean O2 as tolerated   -Maintain SPO2 92-95%     #HTN chronic   #Currently hypotensive   ::Home regimen Amlodipine 10mg qD, Clonidine 0.6mg BID, Hydralazine 50mg BID, Guafacine 2mg qHS  -Currently with low BPs MAPs 50s continue holding home antihypertensives   -consider resuming in stepwise fashion when HDS   -Need to assess for possible adrenal insufficiency has home med Prednisone 5mg qD PRN leg pain?  unclear if taking or true indication, was on 20mg prednisone daily at [facility] if adrenally insufficient this may be contributing factor to hypotension    #Hypothyroidism   -c/w home Levothyroxine 175mg qD     #Chronic pain   #Anxiety   #Insomnia   -recommend scheduled Tylenol vs currently PRN   -currently with Oxycodone 5mg q4 PRN   -Holding home Hydroxyzine 25mg QID PRN   -Holding home Gabapentin 200mg MWF before dialysis   -holding home Trazodone 50mg qHS PRN     F: D10 at 30cc/hr wean with PO intake   E: PRN   N: Renal diet   A: PIV (foot)  DVT PPx: SCDs, chemo ppx held iso recent RUE hemorrhage and anemia     Delma Botello   PGY-3   Internal Medicine     Spoke with CTICU resident Julia Smith DO and Vascular surgery resident Dmitriy Blas MD to relay recommendations, was told that medicine consult is no longer needed as not transferring service as still requires ICU level of care with low MAPs  and poor access.     Thank you for the consult. Medicine will sign off, please feel free to re-engage us when patient is appropriate  "for the floor for re-consideration of transfer to medicine service or any additional questions.   Seen and staffed with Dr. Elizabeth Almodovar.       Consult Status:  Consult Status    (select all that apply): initial  consult complete, will not follow, call as needed   Consult Order ID: [id number]     Attestation:   Note Completion:  I am a:  Resident/Fellow   Attending Attestation I saw and evaluated the patient.  I personally obtained the key and critical portions of the history and physical exam or was physically present for key and  critical portions performed by the resident/fellow. I reviewed the resident/fellow?s documentation and discussed the patient with the resident/fellow.  I agree with the resident/fellow?s medical decision making as documented in the note.     I personally evaluated the patient on 24-Sep-2023         Electronic Signatures:  Delma Botello (Resident))  (Signed 24-Sep-2023 13:45)   Authored: Service, History of Present Illness, Review  Family/Social History and ROS, Allergies, Objective, Assessment/Recommendations, Note Completion  Elizabeth Almodovar)  (Signed 26-Sep-2023 14:20)   Authored: Assessment/Recommendations, Note Completion   Co-Signer: Service, History of Present Illness, Review Family/Social History and ROS, Allergies, Objective, Assessment/Recommendations,  Note Completion      Last Updated: 26-Sep-2023 14:20 by Elizabeth Almodovar)    References:  1.  Data Referenced From \"History and Physical - Critical Care\" 23-Sep-2023 15:29   "

## 2023-10-12 NOTE — CONSULTS
Service:   Service: Nephrology     Consult:  Consult requested by (Attending Name): Kamaljit Wu   Reason: ESRD will need dialysis while admitted     History of Present Illness:   HPI:    TUCKERKeyaMATHEWS VASQUEZ FERRO is a 53 year old Female with PMH ESRD on HD MWF via RUE AVF, COPD on home O2 6L, HFmrEF 45-50%, pulmonary hemosiderosis, and HTN presented  from [facility] on 9/22/2023 for bleeding RUE AVG. Pt had HD at OSH on 9/22 and had complete session. Towards end of HD session, RUE AVG was bleeding excessively. Graft was ligated on 9/22 and pt transferred to [facility] to have group removed. On 9/23, pt  was scheduled for OR graft explant but case cancelled as pt was hypoglycemic, hypotensive, and alterred so transferred to CT ICU on 9/23. Nephrology following for dialysis needs.    Pt seen resting in bed. Slightly confused, not able to provide much info. She is on 5L nc and denies SOB. Says she sometimes feels nauseous. BP was soft yesterday requiring harvey but off yesterday. Labs show K 3.8. Hb 8.2 WBC 11-17 - on IV zosyn and zyvox.    From H&P:  PMH: HTN, HLD, ESRD (MWF RUE AVG), hypothyroid, HFpEF (~70% LVEF), LUISITO, COPD (6L NC), aortic regurg, aortic stenosis, hx DAH, bacteremia, multiple AMA discharges, noncompliant with  care, numerous admissions for missed dialysis sessions  PSH: RUE brach-ax graft placed by Dr. Gan 8-10 years ago  Soc hx: 3-4 cigars daily; no drug or alcohol use    Review Family/Social History and ROS:   Social History:    Smoking Status: former smoker  (1)   Alcohol Use: denies (1)   Drug Use: denies  (1)     Incomplete ROS: patient's impaired mental status              Allergies:  ·  vancomycin : Resp Distress, Itching  ·  carvedilol : Unknown  ·  doxazosin : Unknown  ·  benazepril : Unknown    Objective:     Objective Information:        T   P  R  BP   MAP  SpO2   Value  36.4  99  17  157/44   74  80%  Date/Time 9/24 12:00 9/24 14:00 9/24 14:00 9/24 5:00  9/24 5:00 9/24  14:00  Range  (36.1C - 36.8C )  (77 - 122 )  (12 - 27 )  (83 - 173 )/ (23 - 128 )  (36 - 141 )  (80% - 100% )   As of 24-Sep-2023 12:00:00, patient is on 3 L/min of oxygen via nasal cannula.    Physical Exam by System:    Constitutional: no acute distress, slightly confused,  frail appearance   ENMT: nc   Respiratory/Thorax: no labored breathing, CTA B/L,  5L nc   Cardiovascular: RRR   Gastrointestinal: soft non-tender   Genitourinary: No solomon   Extremities: no leg edema  RUE AVG - wrapped in dressing   Neurological: alert and oriented x1, follows some  commands, lethargic   Psychological: reduced affect     Medications:    Medications:          Continuous Medications       --------------------------------    1. Dextrose 10% in Water Infusion:  500  mL  IntraVenous  <Continuous>    2. Phenylephrine 10 mg/ NaCL 0.9% 250 mL Infusion:  0.5  mcg/kg/min  IntraVenous  <Continuous>         Scheduled Medications       --------------------------------    1. Albumin 25% IV Bolus:  12.5  gram(s)  IntraVenous Piggyback  Every 6 Hours    2. Levothyroxine:  175  microgram(s)  Oral  Daily    3. Linezolid 600 mg IVPB/ Premixed Soln 300 mL:  300  mL  IntraVenous Piggyback  Every 12 Hours    4. Melatonin:  3  mg  Oral  Daily 1800    5. Midodrine:  5  mg  Oral  3 Times a Day    6. Piperacillin - Tazobactam 2.25 grams/Iso-osmotic 50 mL Premix IVPB:  50  mL  IntraVenous Piggyback  Every 8 Hours         PRN Medications       --------------------------------    1. Acetaminophen:  650  mg  Oral  Every 4 Hours    2. Albuterol 2.5 mg - Ipratropium 0.5 mg/ 3 mL Neb Soln:  3  mL  Inhalation  4 Times a Day    3. Calcium Gluconate 1 gram/ NaCL 0.67% 50 mL Premix IVPB:  50  mL  IntraVenous Piggyback  Every 6 Hours    4. Calcium Gluconate 2 gram/ NaCL 0.67% 100 mL Premix IVPB:  100  mL  IntraVenous Piggyback  Every 6 Hours    5. Dextrose 50% in Water Injectable:  12.5  gram(s)  IntraVenous Push  Every 15 Minutes    6. Dextrose 50% in Water  Injectable:  25  gram(s)  IntraVenous Push  Every 15 Minutes    7. Glucagon Injectable:  1  mg  IntraMuscular  Every 15 Minutes    8. Magnesium Sulfate 2 gram/Sterile Water 50 mL Premix Soln:  2  gram(s)  IntraVenous Piggyback  Every 6 Hours    9. Magnesium Sulfate 4 gram/Sterile Water 100 mL Premix Soln:  4  gram(s)  IntraVenous Piggyback  Every 6 Hours    10. Melatonin:  3  mg  Oral  Once    11. Ondansetron Injectable:  4  mg  IntraVenous Push  Every 6 Hours    12. oxyCODONE Immediate Release:  5  mg  Oral  Every 4 Hours    13. Potassium Chloride 20 mEq/Sterile Water 100 mL Premix IVPB:  20  mEq  IntraVenous Piggyback  Every 6 Hours    14. Sodium Chloride 0.9% Injectable Flush:  10  mL  IntraVenous Flush  Every 8 Hours and as Needed        Recent Lab Results:    Results:        I have reviewed these laboratory results:    Renal Function Panel  24-Sep-2023 04:47:00      Result Value    Glucose, Serum  111   H   NA  136    K  3.8    CL  97   L   Bicarbonate, Serum  30    Anion Gap, Serum  13    BUN  17    CREAT  2.67   H   GFR Female  21   A   Calcium, Serum  8.5   L   Phosphorus, Serum  3.0    ALB  2.5   L       Radiology Results:    Results:        Impression:    1.  Worsened diffused ground-glass opacities involving the both  lungs, worsened pulmonary edema/fluid overload.  2. Cardiac silhouette is moderately enlarged. Aorta is tortuous.  Pulmonary vessels are congested.  3. No focal consolidation.  4. No pleural effusion or pneumothorax seen.  5. Removal of the ET tube, enteric tube, pH probe, and the IJ  catheter.  6. Vascular stent along the right axillary artery.           MACRO:  None     Xray Chest 1 View [Sep 24 2023 10:09AM]        Assessment:    VASQUEZ PAK PILLO is a 53 year old Female with PMH ESRD on HD MWF via RUE AVF, COPD on home O2 6L, HFmrEF 45-50%, pulmonary hemosiderosis, and HTN presented  from [facility] on 9/22/2023 for bleeding RUE AVG. Pt had HD at OSH on 9/22 and had complete  session. Towards end of HD session, RUE AVG was bleeding excessively. Graft was ligated on 9/22 and pt transferred to [facility] to have group removed. On 9/23, pt  was scheduled for OR graft explant but case cancelled as pt was hypoglycemic, hypotensive, and alterred so transferred to CT ICU on 9/23. Nephrology following for dialysis needs.    Impression  ESRD on HD MWF - previously with RUE AVG - now ligated; last HD was on 9/22 at OSH (completed most of her session)  RUE AVG bleeding - ligated 9/22 at OSH  - K 3.8  - Hb 8.2  - WBC 11-17 - on IV zosyn and zyvox  - She is on 5L nc, home is 5-6L  - BP stable, off harvey on 9/23  - on D10 drip for hypoglycemia (glc was 30 on 9/23)  - Vasc Surg following - planning RUE AVG explant/removal in next coming days    Recommendations  - no acute indication for dialysis today  - pt currently does not have dialysis access as RUE AVG was ligated and there is plan for explant soon; pt will need tunneled dialysis catheter placed    D/w Dr. Angelika Sanders MD  Nephrology Fellow PGY5    Consult Status:  Consult Status    (select all that apply): initial  consult complete, will follow   Consult Order ID: [id number]     Attestation:   Note Completion:  I am a:  Resident/Fellow   Attending Attestation I saw and evaluated the patient.  I personally obtained the key and critical portions of the history and physical exam or was physically present for key and  critical portions performed by the resident/fellow. I reviewed the resident/fellow?s documentation and discussed the patient with the resident/fellow.  I agree with the resident/fellow?s medical decision making as documented in the note.     I personally evaluated the patient on 24-Sep-2023         Electronic Signatures:  Virgie Carney)  (Signed 27-Sep-2023 15:57)   Authored: Assessment/Recommendations, Note Completion   Co-Signer: Service, History of Present Illness, Review Family/Social History and ROS, Allergies,  "Objective, Assessment/Recommendations,  Note Completion  Jazlyn Sanders (Fellow))  (Signed 24-Sep-2023 15:52)   Authored: Service, History of Present Illness, Review  Family/Social History and ROS, Allergies, Objective, Assessment/Recommendations, Note Completion      Last Updated: 27-Sep-2023 15:57 by Virgie Carney (MD)    References:  1.  Data Referenced From \"Consult-Medicine\" 24-Sep-2023 13:12   "

## 2023-10-12 NOTE — CONSULTS
Service:   Service: Cardiology     Consult:  Consult requested by (Attending Name): Lilian Urbano   Reason: thickening of the tricuspid valve leaflets,  there is moderately restricted tricuspid valve leaflet mobility on recent Echo.     History of Present Illness:   HPI:    VASQUEZ PAK is a 53 year old Female with history of COPD/asthma (6L NC), HFpEF, ESRD (MWF), HTN, HLD, hypothyroidism, pulmonary hemosiderosis, and  diffuse alveolar hemorrhage in setting of inhalation of cleaning fluid (5/2017) and recent discharge from [facility] on 7/28/23 for septic shock due to Staphlococcus lugdunensis bacteremia, pneumonia, and acute hypoxemic respiratory failure presented  to [facility] initially 9/2/23 in acute on chronic hypercapnic respiratory failure/septic shock requiring intubation and admission to ICU level care. Patient was then subsequently transferred [facility] on 9/22/2023 following episode of bleeding from RUE AVG  at dialysis. Exposed graft, large skin defect. Graft ligated by IR at [facility] and transferred to [facility] for further evaluation and management by vascular surgery.     Cardiology consulted in light of echocardiogram findings of severe TR, restricted TV leaflet mobility and severely elevated RVSP.   Compared with echo study from 9/6/2023, the technique and dedicated RV images are different. In today's study (9/25) the degree of tricuspid regurgitation is severe compared with moderate and the RVSP is now severely increased at 143 compared to moderate  to severe on the prior study. The LVEF is now hyperdynamic with EF 65-70% compared to mildly reduced (45%).  Last HD session prior to the most recent echocardiogram was on 9/22 and noted to have only half sessions due to hemodynamic instability in the setting of sepsis.  Patient with complaints of SOB, orthopnea, but no chest pain episodes palpitations, lightheadedness/dizziness, LOC. Patient's functional status is very limited with  multiple hospitalizations in past year.       PMH: HTN, HLD, ESRD (MWF RUE AVG), hypothyroid, HFpEF (~70% LVEF), LUISITO, COPD (6L NC), aortic regurg, aortic stenosis, hx DAH, bacteremia, multiple AMA discharges, noncompliant with  care, numerous admissions for missed dialysis sessions    PSH: RUE brach-ax graft placed by Dr. Gan 8-10 years ago    Soc hx: 3-4 cigars daily; no drug or alcohol use        Review Family/Social History and ROS:   Social History:    Smoking Status: former smoker  (1)   Alcohol Use: denies (1)   Drug Use: denies  (1)            Allergies:  ·  vancomycin : Resp Distress, Itching  ·  carvedilol : Unknown  ·  doxazosin : Unknown  ·  benazepril : Unknown    Objective:     Objective Information:        T   P  R  BP   MAP  SpO2   Value  36.2  91  10         99%  Date/Time 9/26 8:00 9/26 9:00 9/26 9:00      9/26 9:00  Range  (36.1C - 36.5C )  (88 - 103 )  (6 - 35 )      (72% - 100% )   As of 26-Sep-2023 08:00:00, patient is on 8 L/min of oxygen via nasal cannula.         Weights   9/26 6:15: Med Calc Weight (kg) (MED CALC WEIGHT (kg))  44.6  9/25 9:24: BMI (kg/m2) (BMI (kg/m2))  19.202    Physical Exam Narrative:  ·  Physical Exam:    General: NAD, AOx2  HEENT: EOMI, MMM, no LAD, no thyroid nodule or enlargement  Neck: +JVD, supple  Cardiac: Normal S, S2. + systolic murmur noted  Lungs: Clear lungs bilaterally. Good bilateral air entry   Abdomen: Soft, non-tender, non-distended   Extremities: No LE edema   Neuro: No apparent focal deficits      Medications:    Medications:          Continuous Medications       --------------------------------    1. Dextrose 10% in Water Infusion:  500  mL  IntraVenous  <Continuous>    2. Norepinephrine 8 mg/ D5W 250 mL Infusion:  0.01  mcg/kg/min  IntraVenous  <Continuous>         Scheduled Medications       --------------------------------    1. Collagenase 250 Units/ gram Topical:  1  application(s)  Topical  Daily    2. Hydrocortisone Na Succinate IV Piggy  Back:  50  mg  IntraVenous Piggyback  Every 6 Hours    3. Levothyroxine:  175  microgram(s)  Oral  Daily    4. Linezolid 600 mg IVPB/ Premixed Soln 300 mL:  300  mL  IntraVenous Piggyback  Every 12 Hours    5. Melatonin:  3  mg  Oral  Daily 1800    6. Midodrine:  10  mg  Oral  Every 8 Hours    7. Pantoprazole Injectable:  40  mg  IntraVenous Push  Every 12 Hours    8. Piperacillin - Tazobactam 2.25 grams/Iso-osmotic 50 mL Premix IVPB:  50  mL  IntraVenous Piggyback  Every 8 Hours    9. Thiamine IV Piggy Back:  500  mg  IntraVenous Piggyback  3 Times a Day         PRN Medications       --------------------------------    1. Acetaminophen:  650  mg  Oral  Every 4 Hours    2. Albuterol 2.5 mg - Ipratropium 0.5 mg/ 3 mL Neb Soln:  3  mL  Inhalation  4 Times a Day    3. Dextrose 50% in Water Injectable:  25  gram(s)  IntraVenous Push  Every 15 Minutes    4. Dextrose 50% in Water Injectable:  12.5  gram(s)  IntraVenous Push  Every 15 Minutes    5. Glucagon Injectable:  1  mg  IntraMuscular  Every 15 Minutes    6. Ondansetron Injectable:  4  mg  IntraVenous Push  Every 6 Hours           Currently Suspended Medications       --------------------------------    1. oxyCODONE Immediate Release:  5  mg  Oral  Every 4 Hours      Recent Lab Results:    Results:        I have reviewed these laboratory results:    Complete Blood Count  26-Sep-2023 06:55:00      Result Value    Lab Comment:  HGB CALLED RB TO CHRISTIAN ODESSA X [id number], 09/26/2023 08:20    White Blood Cell Count  15.5   H   Nucleated Erythrocyte Count  0.0    Red Blood Cell Count  1.85   L   HGB  5.4   LL   HCT  15.5   L   MCV  84    MCHC  34.8    PLT  71   L   RDW-CV  16.3   H     TSH with Reflex to Free T4 if Abnormal  26-Sep-2023 06:55:00      Result Value    Thyroid Stimulating Hormone, Serum  19.32   H     Comprehensive Metabolic Panel  26-Sep-2023 06:55:00      Result Value    Glucose, Serum  71   L   NA  130   L   K  4.5    CL  94   L   Bicarbonate, Serum  23     Anion Gap, Serum  18    BUN  38   H   CREAT  3.57   H   GFR Female  15   A   Calcium, Serum  8.9    ALB  3.0   L   ALKP  150   H   T Pro  4.8   L   T Bili  1.5   H   Alanine Aminotransferase, Serum  4   L   Aspartate Transaminase, Serum  7   L     Vitamin B12, Serum  26-Sep-2023 06:55:00      Result Value    Vitamin B12, Serum  1631   H       Radiology Results:    Results:        Conclusion:  CONCLUSIONS:  1. Left ventricular systolic function is hyperdynamic with a 75-80% estimated ejection fraction.  2. Right ventricular volume and pressure overload.  3. There is reduced right ventricular systolic function.  4. The right ventricle is mildly enlarged.  5. Moderate aortic valve regurgitation.  6. There is moderate to severe aortic valve cusp calcification.  7. Mild mitral valve regurgitation.  8. The mitral valve is moderately thickened. The mitral valve appears to be degenerative.  9. There is severe mitral annular calcification.  10. Severe tricuspid regurgitation visualized.  11. There is significantly restricted tricuspid valve septal leaflet mobility with pseudoprolapse of the anterior leaflet.  12. The tricuspid valve annulus appears dilated.  13. Severely elevated right ventricular systolic pressure.        [id number] Richy Emmanuel MD  Electronically signed on 9/25/2023 at 3:42:55 PM        *** Final ***     Echocardiogram [Sep 25 2023  3:43PM]        Assessment:    VASQUEZ PAK is a 53 year old Female with history of COPD/asthma (6L NC), HFpEF, ESRD (MWF), HTN, HLD, hypothyroidism, pulmonary hemosiderosis, and  diffuse alveolar hemorrhage in setting of inhalation of cleaning fluid (5/2017) transferred from [facility] for exposed RUE AVG and potential need for surgical intervention.     Cardiology consulted in light of echocardiogram findings of severe TR, restricted TV leaflet mobility and severely elevated RVSP.   Compared with echo study from 9/6/2023, the technique and dedicated RV images are  different. In today's study the degree of tricuspid regurgitation is severe compared with moderate and the RVSP is now severely increased at 143 compared to moderate to  severe on the prior study. The LVEF is now hyperdynamic with EF 65-70% compared to mildly reduced (45%).  Last HD session prior to the most recent echocardiogram was on 9/22 and noted to have only half sessions due to hemodynamic instability in the setting of sepsis.  Patient with complaints of SOB, orthopnea, but no chest pain episodes palpitations, lightheadedness/dizziness, LOC. Patient's functional status is very limited with multiple hospitalizations in past year.       # Severe TR  # Severe pHTN  - Likely secondary in the setting of severe pHTN (COPD on home oxygen, pulmonary hemosiderosis and prior pulmonary insults, HFpEF and volume overload in the setting of ESRD). Patient is hypervolumic in the setting of missed HD sessions and incomplete  HD sessions lately   - Echo showing TV annular dilatation consistent with secondary/functional TR in the setting of RV pressure and volume overload. TV leaflet thickening and restricted mobility also noted which is also contributing to her severe TR    # Severe concentric LVH and RV hypertrophy  On further review of the echocardiogram images, there is severe LV wall thickening consistent with severe concentric LVH as well as increased RV wall thickening and valvular thickening. Will need to rule out infiltrative cardiac disease such as amyloid  with cMRI though those changes could be seen with severe systemic hypertension and pHTN.       Recommendations:  - Patient will need aggressive optimization of her volume status with HD once able (planning for graft repair and placement of temporary dialysis line)  - No current indication for surgical intervention on her TV given this is likely secondary/function   - Will need to repeat echocardiogram once patient euvolemic to re-assess degree of TR  - Would  "recommend RHC for further evaluation of her pHTN  - Once patient optimized would also recommend a Cardiac MRI to rule out any infiltrative disease       Case discussed with Dr. Phillips          Consult Status:  Consult Status    (select all that apply): initial  consult complete, will follow   Consult Order ID: [id number]     Attestation:   Note Completion:  I am a:  Resident/Fellow   Attending Attestation I saw and evaluated the patient.  I personally obtained the key and critical portions of the history and physical exam or was physically present for key and  critical portions performed by the resident/fellow. I reviewed the resident/fellow?s documentation and discussed the patient with the resident/fellow.  I agree with the resident/fellow?s medical decision making as documented in the note.     I personally evaluated the patient on 26-Sep-2023   Comments/ Additional Findings    ?amyloid  diursis/HD  consider RHC and cardiac MRI  very advanced disease  severe LVH          Electronic Signatures:  Nader Perea (Fellow))  (Signed 26-Sep-2023 15:33)   Authored: Service, History of Present Illness, Review  Family/Social History and ROS, Allergies, Objective, Assessment/Recommendations, Note Completion  Lc Phillips)  (Signed 28-Sep-2023 13:08)   Authored: Assessment/Recommendations, Note Completion   Co-Signer: Objective, Assessment/Recommendations, Note Completion      Last Updated: 28-Sep-2023 13:08 by Lc Phillips)    References:  1.  Data Referenced From \"Consult-Gastroenterology\" 25-Sep-2023 18:23   "

## 2023-10-13 PROCEDURE — 9990 CHARGE CONVERSION

## 2023-10-16 ENCOUNTER — HOSPITAL ENCOUNTER (OUTPATIENT)
Dept: CARDIOLOGY | Facility: HOSPITAL | Age: 53
Discharge: HOME | End: 2023-10-16

## 2023-10-16 LAB
ATRIAL RATE: 109 BPM
P OFFSET: 159 MS
P ONSET: 65 MS
PR INTERVAL: 322 MS
Q ONSET: 226 MS
QRS COUNT: 18 BEATS
QRS DURATION: 92 MS
QT INTERVAL: 410 MS
QTC CALCULATION(BAZETT): 552 MS
QTC FREDERICIA: 500 MS
R AXIS: 77 DEGREES
T AXIS: -55 DEGREES
T OFFSET: 431 MS
VENTRICULAR RATE: 109 BPM

## 2023-10-16 PROCEDURE — 93005 ELECTROCARDIOGRAM TRACING: CPT

## 2023-12-12 LAB
COMPLETE PATHOLOGY REPORT: NORMAL
CONVERTED CLINICAL DIAGNOSIS-HISTORY: NORMAL
CONVERTED FINAL DIAGNOSIS: NORMAL
CONVERTED FINAL REPORT PDF LINK TO COPY AND PASTE: NORMAL
CONVERTED GROSS DESCRIPTION: NORMAL